# Patient Record
Sex: FEMALE | Race: ASIAN | NOT HISPANIC OR LATINO | Employment: OTHER | URBAN - METROPOLITAN AREA
[De-identification: names, ages, dates, MRNs, and addresses within clinical notes are randomized per-mention and may not be internally consistent; named-entity substitution may affect disease eponyms.]

---

## 2017-05-23 ENCOUNTER — TRANSFERRED RECORDS (OUTPATIENT)
Dept: HEALTH INFORMATION MANAGEMENT | Facility: CLINIC | Age: 67
End: 2017-05-23

## 2017-06-02 ENCOUNTER — TRANSFERRED RECORDS (OUTPATIENT)
Dept: HEALTH INFORMATION MANAGEMENT | Facility: CLINIC | Age: 67
End: 2017-06-02

## 2017-08-14 ENCOUNTER — PRE VISIT (OUTPATIENT)
Dept: OTOLARYNGOLOGY | Facility: CLINIC | Age: 67
End: 2017-08-14

## 2017-08-14 NOTE — TELEPHONE ENCOUNTER
Records received from [facility] & [facility], forwarded to clinic.   Office notes:  DOS 5/23/17, 4/24/17, 4/17/17 with Dr. Nicole Watkins  Radiology reports:  DOS 6/2/17 CT Maxillofacial  (done at [facility]) - (img is in PACS)

## 2017-08-14 NOTE — TELEPHONE ENCOUNTER
1.  Date/reason for appt: 8/28/17 at 11:30 AM - Cyst in Cheek  2.  Referring provider: [facility] Dr. Nicole Watkins  3.  Call to patient (Yes / No - short description): No, pt referred  4.  Previous care at:   [facility]     [facility]

## 2021-05-23 ENCOUNTER — OFFICE VISIT - HEALTHEAST (OUTPATIENT)
Dept: FAMILY MEDICINE | Facility: CLINIC | Age: 71
End: 2021-05-23

## 2021-05-23 DIAGNOSIS — J02.9 PHARYNGITIS, UNSPECIFIED ETIOLOGY: ICD-10-CM

## 2021-05-23 DIAGNOSIS — J02.0 STREPTOCOCCAL PHARYNGITIS: ICD-10-CM

## 2021-05-23 LAB — DEPRECATED S PYO AG THROAT QL EIA: ABNORMAL

## 2021-05-23 RX ORDER — ACETAMINOPHEN 500 MG
1000 TABLET ORAL EVERY 6 HOURS PRN
Qty: 100 TABLET | Refills: 2 | Status: SHIPPED | OUTPATIENT
Start: 2021-05-23 | End: 2023-12-14

## 2021-05-24 LAB
SARS-COV-2 PCR COMMENT: NORMAL
SARS-COV-2 RNA SPEC QL NAA+PROBE: NEGATIVE
SARS-COV-2 VIRUS SPECIMEN SOURCE: NORMAL

## 2021-05-25 ENCOUNTER — COMMUNICATION - HEALTHEAST (OUTPATIENT)
Dept: SCHEDULING | Facility: CLINIC | Age: 71
End: 2021-05-25

## 2021-05-30 ENCOUNTER — RECORDS - HEALTHEAST (OUTPATIENT)
Dept: ADMINISTRATIVE | Facility: CLINIC | Age: 71
End: 2021-05-30

## 2021-05-31 ENCOUNTER — RECORDS - HEALTHEAST (OUTPATIENT)
Dept: ADMINISTRATIVE | Facility: CLINIC | Age: 71
End: 2021-05-31

## 2021-06-16 PROBLEM — J18.9 COMMUNITY ACQUIRED PNEUMONIA OF LEFT LUNG, UNSPECIFIED PART OF LUNG: Status: ACTIVE | Noted: 2018-05-10

## 2021-06-17 NOTE — PROGRESS NOTES
Chief Complaint   Patient presents with     Headache     JUST TODAY-- MILD SORE THROAT      Cough     Blood pressure 149/66, pulse 67, temperature 98.3  F (36.8  C), resp. rate 16, SpO2 98 %.         SUBJECTIVE       Katja is a 70 y.o.  female with a PMH significant for:     Patient Active Problem List   Diagnosis     Benign Essential Hypertension     Esophageal Reflux     Ecchymosis     Community acquired pneumonia of left lung, unspecified part of lung     Abnormal urinalysis     Leukocytosis, unspecified type     Physical deconditioning       Patient complaining of frontal headache, sore throat, cough that started 1 day ago.  Did receive the [employer] vaccine a few months ago.  Sick contacts include  who is here today with her, complaining of similar symptoms.  Patient denies any fevers, chills, chest pain, shortness of breath, constipation, diarrhea.  Denies any known allergies.  Has tried some herbal remedies with minimal improvement in her symptoms.          OBJECTIVE     Vitals:    05/23/21 1725   BP: 149/66   Patient Site: Right Arm   Patient Position: Sitting   Cuff Size: Adult Regular   Pulse: 67   Resp: 16   Temp: 98.3  F (36.8  C)   SpO2: 98%     There is no height or weight on file to calculate BMI.    Constitutional: Awake, alert, cooperative, no acute distress, and appears stated age.  Eyes: sclera clear, conjunctiva normal.  ENT: NC/AT, MMM.  Tonsils nonerythematous and without exudates.  Neck: Supple, symmetrical, trachea midline.  No submandibular lymphadenopathy appreciated.  Back: Symmetric, no curvature  Lungs: No increased WOB, CTABL, no crackles or wheezing appreciated.  Cardiovascular: Appears well perfused, RRR, normal S1 and S2, no S3 or S4, and no murmur appreciated.  Neurologic: Cranial nerves II-XII are grossly intact.   Neuropsychiatric: Normal affect, mood, orientation, memory and insight.    Pertinent Labs  Recent Results (from the past 24 hour(s))   Rapid Strep A  Screen-Throat swab    Specimen: Throat   Result Value Ref Range    Rapid Strep A Antigen Group A Strep detected (!) No Group A Strep detected, presumptive negative     Lab Results: personally reviewed.       ASSESSMENT AND PLAN     Katja was seen today for headache and cough.    Diagnoses and all orders for this visit:    Streptococcal pharyngitis  Recommend symptomatic management with Tylenol, rest, hydration, in addition to amoxicillin as below.  If symptoms worsen or continue to persist, recommend following up with PCP or come back to urgent care.  -     acetaminophen (TYLENOL EXTRA STRENGTH) 500 MG tablet; Take 2 tablets (1,000 mg total) by mouth every 6 (six) hours as needed for pain.  -     amoxicillin (AMOXIL) 500 MG capsule; Take 1 capsule (500 mg total) by mouth 2 (two) times a day for 10 days.    Pharyngitis, unspecified etiology  Covid pending, advised to remain quarantine until results come back, please call patient once resulted.  -     Rapid Strep A Screen-Throat swab  -     Symptomatic COVID-19 Virus (CORONAVIRUS) PCR      Austin Ariane  5/23/2021

## 2021-06-18 NOTE — PATIENT INSTRUCTIONS - HE
Patient Instructions by Austin Thakkar MD at 5/23/2021  5:00 PM     Author: Austin Thakkar MD Service: -- Author Type: Physician    Filed: 5/23/2021  6:38 PM Encounter Date: 5/23/2021 Status: Signed    : Austin Thakkar MD (Physician)         Patient Education     Pharyngitis: Strep (Confirmed)    You have had a positive test for strep throat. Strep throat is a contagious illness. It is spread by coughing, kissing or by touching others after touching your mouth or nose. Symptoms include throat pain that is worse with swallowing, aching all over, headache, and fever. It is treated with antibiotic medicine. This should help you start to feel better in 1 to 2 days.  Home care    Rest at home. Drink plenty of fluids to you won't get dehydrated.    No work or school for the first 2 days of taking the antibiotics. After this time, you will not be contagious. You can then return to school or work if you are feeling better.     Take antibiotic medicine for the full 10 days, even if you feel better. This is very important to ensure the infection is treated. It is also important to prevent medicine-resistant germs from developing. If you were given an antibiotic shot, you don't need any more antibiotics.    You may use acetaminophen or ibuprofen to control pain or fever, unless another medicine was prescribed for this. Talk with your healthcare provider before taking these medicines if you have chronic liver or kidney disease. Also talk with your healthcare provider if you have had a stomach ulcer or GI bleeding.    Throat lozenges or sprays help reduce pain. Gargling with warm saltwater will also reduce throat pain. Dissolve 1/2 teaspoon of salt in 1 glass of warm water. This may be useful just before meals.     Soft foods are OK. Don't eat salty or spicy foods.  Follow-up care  Follow up with your healthcare provider or our staff if you don't get better over the next week.  When to seek medical advice  Call your healthcare  provider right away if any of these occur:    Fever of 100.4 F (38 C) or higher, or as directed by your healthcare provider    New or worsening ear pain, sinus pain, or headache    Painful lumps in the back of neck    Stiff neck    Lymph nodes getting larger or becoming soft in the middle    You can't swallow liquids or you can't open your mouth wide because of throat pain    Signs of dehydration. These include very dark urine or no urine, sunken eyes, and dizziness.    Trouble breathing or noisy breathing    Muffled voice    Rash  Prevention  Here are steps you can take to help prevent an infection:    Keep good hand washing habits.    Dont have close contact with people who have sore throats, colds, or other upper respiratory infections.    Dont smoke, and stay away from secondhand smoke.  Date Last Reviewed: 11/1/2017 2000-2017 [employer] [address] [place] [address], [place] [address]. All rights reserved. This information is not intended as a substitute for professional medical care. Always follow your healthcare professional's instructions.

## 2021-07-06 VITALS
RESPIRATION RATE: 16 BRPM | DIASTOLIC BLOOD PRESSURE: 66 MMHG | TEMPERATURE: 98.3 F | SYSTOLIC BLOOD PRESSURE: 149 MMHG | OXYGEN SATURATION: 98 % | HEART RATE: 67 BPM

## 2022-06-11 ENCOUNTER — APPOINTMENT (OUTPATIENT)
Dept: CT IMAGING | Facility: HOSPITAL | Age: 72
End: 2022-06-11
Attending: EMERGENCY MEDICINE
Payer: COMMERCIAL

## 2022-06-11 ENCOUNTER — APPOINTMENT (OUTPATIENT)
Dept: RADIOLOGY | Facility: HOSPITAL | Age: 72
End: 2022-06-11
Attending: EMERGENCY MEDICINE
Payer: COMMERCIAL

## 2022-06-11 ENCOUNTER — HOSPITAL ENCOUNTER (EMERGENCY)
Facility: HOSPITAL | Age: 72
Discharge: HOME | End: 2022-06-11
Attending: EMERGENCY MEDICINE | Admitting: EMERGENCY MEDICINE
Payer: COMMERCIAL

## 2022-06-11 VITALS
HEART RATE: 69 BPM | WEIGHT: 99 LBS | BODY MASS INDEX: 19.33 KG/M2 | SYSTOLIC BLOOD PRESSURE: 180 MMHG | DIASTOLIC BLOOD PRESSURE: 81 MMHG | TEMPERATURE: 98.2 F | OXYGEN SATURATION: 97 % | RESPIRATION RATE: 20 BRPM

## 2022-06-11 DIAGNOSIS — U07.1 INFECTION DUE TO 2019 NOVEL CORONAVIRUS: ICD-10-CM

## 2022-06-11 DIAGNOSIS — R51.9 NONINTRACTABLE HEADACHE, UNSPECIFIED CHRONICITY PATTERN, UNSPECIFIED HEADACHE TYPE: ICD-10-CM

## 2022-06-11 LAB
ANION GAP SERPL CALCULATED.3IONS-SCNC: 12 MMOL/L (ref 5–18)
BASOPHILS # BLD AUTO: 0 10E3/UL (ref 0–0.2)
BASOPHILS NFR BLD AUTO: 0 %
BUN SERPL-MCNC: 15 MG/DL (ref 8–22)
CALCIUM SERPL-MCNC: 10.1 MG/DL (ref 8.5–10.5)
CHLORIDE BLD-SCNC: 87 MMOL/L (ref 98–107)
CO2 SERPL-SCNC: 29 MMOL/L (ref 22–31)
CREAT SERPL-MCNC: 0.8 MG/DL (ref 0.6–1.1)
EOSINOPHIL # BLD AUTO: 0 10E3/UL (ref 0–0.7)
EOSINOPHIL NFR BLD AUTO: 0 %
ERYTHROCYTE [DISTWIDTH] IN BLOOD BY AUTOMATED COUNT: 12 % (ref 10–15)
GFR SERPL CREATININE-BSD FRML MDRD: 79 ML/MIN/1.73M2
GLUCOSE BLD-MCNC: 112 MG/DL (ref 70–125)
HCT VFR BLD AUTO: 42.9 % (ref 35–47)
HGB BLD-MCNC: 14.5 G/DL (ref 11.7–15.7)
HOLD SPECIMEN: NORMAL
IMM GRANULOCYTES # BLD: 0 10E3/UL
IMM GRANULOCYTES NFR BLD: 0 %
LYMPHOCYTES # BLD AUTO: 1.3 10E3/UL (ref 0.8–5.3)
LYMPHOCYTES NFR BLD AUTO: 15 %
MAGNESIUM SERPL-MCNC: 1.9 MG/DL (ref 1.8–2.6)
MCH RBC QN AUTO: 27.5 PG (ref 26.5–33)
MCHC RBC AUTO-ENTMCNC: 33.8 G/DL (ref 31.5–36.5)
MCV RBC AUTO: 81 FL (ref 78–100)
MONOCYTES # BLD AUTO: 0.7 10E3/UL (ref 0–1.3)
MONOCYTES NFR BLD AUTO: 8 %
NEUTROPHILS # BLD AUTO: 6.7 10E3/UL (ref 1.6–8.3)
NEUTROPHILS NFR BLD AUTO: 77 %
NRBC # BLD AUTO: 0 10E3/UL
NRBC BLD AUTO-RTO: 0 /100
PLATELET # BLD AUTO: 219 10E3/UL (ref 150–450)
POTASSIUM BLD-SCNC: 3.5 MMOL/L (ref 3.5–5)
RBC # BLD AUTO: 5.27 10E6/UL (ref 3.8–5.2)
SODIUM SERPL-SCNC: 128 MMOL/L (ref 136–145)
TROPONIN I SERPL-MCNC: 0.02 NG/ML (ref 0–0.29)
WBC # BLD AUTO: 8.7 10E3/UL (ref 4–11)

## 2022-06-11 PROCEDURE — 80048 BASIC METABOLIC PNL TOTAL CA: CPT | Performed by: EMERGENCY MEDICINE

## 2022-06-11 PROCEDURE — 85025 COMPLETE CBC W/AUTO DIFF WBC: CPT | Performed by: EMERGENCY MEDICINE

## 2022-06-11 PROCEDURE — 96375 TX/PRO/DX INJ NEW DRUG ADDON: CPT

## 2022-06-11 PROCEDURE — 250N000011 HC RX IP 250 OP 636: Performed by: EMERGENCY MEDICINE

## 2022-06-11 PROCEDURE — 71046 X-RAY EXAM CHEST 2 VIEWS: CPT

## 2022-06-11 PROCEDURE — 36415 COLL VENOUS BLD VENIPUNCTURE: CPT | Performed by: EMERGENCY MEDICINE

## 2022-06-11 PROCEDURE — 258N000003 HC RX IP 258 OP 636: Performed by: EMERGENCY MEDICINE

## 2022-06-11 PROCEDURE — 84484 ASSAY OF TROPONIN QUANT: CPT | Performed by: EMERGENCY MEDICINE

## 2022-06-11 PROCEDURE — 70450 CT HEAD/BRAIN W/O DYE: CPT

## 2022-06-11 PROCEDURE — 96374 THER/PROPH/DIAG INJ IV PUSH: CPT

## 2022-06-11 PROCEDURE — 99285 EMERGENCY DEPT VISIT HI MDM: CPT | Mod: 25

## 2022-06-11 PROCEDURE — 83735 ASSAY OF MAGNESIUM: CPT | Performed by: EMERGENCY MEDICINE

## 2022-06-11 PROCEDURE — 96361 HYDRATE IV INFUSION ADD-ON: CPT

## 2022-06-11 PROCEDURE — 93005 ELECTROCARDIOGRAM TRACING: CPT | Performed by: EMERGENCY MEDICINE

## 2022-06-11 RX ORDER — METOCLOPRAMIDE HYDROCHLORIDE 5 MG/ML
10 INJECTION INTRAMUSCULAR; INTRAVENOUS ONCE
Status: COMPLETED | OUTPATIENT
Start: 2022-06-11 | End: 2022-06-11

## 2022-06-11 RX ORDER — DIPHENHYDRAMINE HYDROCHLORIDE 50 MG/ML
25 INJECTION INTRAMUSCULAR; INTRAVENOUS ONCE
Status: COMPLETED | OUTPATIENT
Start: 2022-06-11 | End: 2022-06-11

## 2022-06-11 RX ORDER — KETOROLAC TROMETHAMINE 30 MG/ML
15 INJECTION, SOLUTION INTRAMUSCULAR; INTRAVENOUS ONCE
Status: COMPLETED | OUTPATIENT
Start: 2022-06-11 | End: 2022-06-11

## 2022-06-11 RX ADMIN — KETOROLAC TROMETHAMINE 15 MG: 30 INJECTION, SOLUTION INTRAMUSCULAR at 14:17

## 2022-06-11 RX ADMIN — METOCLOPRAMIDE HYDROCHLORIDE 10 MG: 5 INJECTION INTRAMUSCULAR; INTRAVENOUS at 14:12

## 2022-06-11 RX ADMIN — DIPHENHYDRAMINE HYDROCHLORIDE 25 MG: 50 INJECTION, SOLUTION INTRAMUSCULAR; INTRAVENOUS at 14:14

## 2022-06-11 RX ADMIN — SODIUM CHLORIDE 1000 ML: 9 INJECTION, SOLUTION INTRAVENOUS at 14:38

## 2022-06-11 NOTE — ED PROVIDER NOTES
[facility] EMERGENCY DEPARTMENT  PHYSICIAN SIGNOUT NOTE    MRN: [id number]    FINAL IMPRESSION     1. Infection due to 2019 novel coronavirus    2. Nonintractable headache, unspecified chronicity pattern, unspecified headache type          ED COURSE & MDM     Please refer to the previous provider's note for full HPI, ROS, exam, and MDM.      3:47 PM Signout received from Leeanne Fortune MD, now primarily managing the patient.    4:21 PM Patient reevaluated prior to discharge.    Patient presented for cough. Initial vital signs reassuring.  Her symptoms are consistent with her known COVID infection.  Labs are reassuring, no signs of concerning complications. Patient discharged in stable condition. Return precautions provided. All questions answered.    =================================================================    SUMMARY     Brief HPI: 71 year old female presenting with cough. Patient reports onset of cough, headache, and increasing weakness ever since testing positive for covid 19 a weeks ago. She has a daughter that endorses poor appetite and reduce oral fluid intake as well. Patient is vaccinated for covid.  Patient reports some nausea at times but is not currently nauseated.  She denies any shortness of breath or chest pain.  She denies abdominal pain.  She previously had fevers and chills but had has resolved.  She denies any associated urinary symptoms.  Patient reports that her headache has been getting worse for the last week but has been present the whole time.    Notable exam findings: None    Pertinent labwork & imaging: Patient has hazy groundglass opacities in her bilateral lower lungs which is likely consistent with COVID-pneumonia.    Pending: labs    Plan: discharge if labs are unremarkable      TESTING   All testing reviewed and interpreted.    EKG  See Dr. Michele's note.    LABS  Labs Ordered and Resulted from Time of ED Arrival to Time of ED Departure   BASIC  METABOLIC PANEL - Abnormal       Result Value    Sodium 128 (*)     Potassium 3.5      Chloride 87 (*)     Carbon Dioxide (CO2) 29      Anion Gap 12      Urea Nitrogen 15      Creatinine 0.80      Calcium 10.1      Glucose 112      GFR Estimate 79     CBC WITH PLATELETS AND DIFFERENTIAL - Abnormal    WBC Count 8.7      RBC Count 5.27 (*)     Hemoglobin 14.5      Hematocrit 42.9      MCV 81      MCH 27.5      MCHC 33.8      RDW 12.0      Platelet Count 219      % Neutrophils 77      % Lymphocytes 15      % Monocytes 8      % Eosinophils 0      % Basophils 0      % Immature Granulocytes 0      NRBCs per 100 WBC 0      Absolute Neutrophils 6.7      Absolute Lymphocytes 1.3      Absolute Monocytes 0.7      Absolute Eosinophils 0.0      Absolute Basophils 0.0      Absolute Immature Granulocytes 0.0      Absolute NRBCs 0.0     MAGNESIUM - Normal    Magnesium 1.9     TROPONIN I - Normal    Troponin I 0.02         IMAGING  Head CT w/o contrast   Final Result   IMPRESSION:   1.  No acute intracranial hemorrhage.   2.  Chronic lacunar infarcts involving the left caudate nucleus.      XR Chest 2 Views   Final Result   IMPRESSION: Hazy groundglass opacities at the lung bases. Small bilateral pleural effusions. No pneumothorax.               I attest that Yaritza Jean-Baptiste is acting in a scribe capacity, has observed my performance of services, and has documented them in accordance with my direction.       Shane Walters MD  06/11/22 8699

## 2022-06-11 NOTE — DISCHARGE INSTRUCTIONS
You were seen in the Emergency Department today for evaluation of cough and headache and generally just not feeling well.   Your imaging studies showed COVID-pneumonia.  Your oxygen level looks good.  Follow up with your primary care physician to ensure resolution of symptoms. Return if you have new or worsening symptoms.     You may be a candidate for Monoclonal antibodies: ~90% effective at hospitalization reduction (infusion)        Requirements:            -Within 10 days of symptom onset           -Administered through MNRAP ([email or web address] providers, patients or someone helping may complete this screening tool. [facility] offers a lottery process to refer patients for COVID-19 monoclonal antibody (mAb) treatment. Patients may call [phone] with questions about monoclonal antibodies.)

## 2022-06-11 NOTE — ED PROVIDER NOTES
EMERGENCY DEPARTMENT ENCOUNTER      NAME: Katja Long  AGE: 71 year old female  YOB: 1950  MRN: [id number]  EVALUATION DATE & TIME: No admission date for patient encounter.    PCP: Junito Saenz    ED PROVIDER: Marlena Michele M.D.      Chief Complaint   Patient presents with     Cough     FINAL IMPRESSION:  1. Infection due to 2019 novel coronavirus    2. Nonintractable headache, unspecified chronicity pattern, unspecified headache type      ED COURSE & MEDICAL DECISION MAKING:    Pertinent Labs & Imaging studies reviewed. (See chart for details)  ED Course as of 06/11/22 1506   Sat Jun 11, 2022   1218 Given the patient's presentation today we will plan to get CT imaging of the head to make sure there is not another cause of the headache.  I certainly think it could just be related to recent COVID.  We will check some blood work to make sure she is not dehydrated or anemic or have significant electrolyte abnormalities.  We will get a chest x-ray to look for any signs of pneumonia although her lung sounds are clear bilaterally.  We will treat her with a migraine cocktail to see if that helps with her headache and will give her some IV fluids.  I think if all of her labs and imaging come back unremarkable and we can get her feeling better this is likely just sequelae related to recent COVID.  Her and her family are in agreement with the plan.   1352 Patient has hazy groundglass opacities in her bilateral lower lungs which is likely consistent with COVID-pneumonia.  We will see with the rest of her labs and imaging look like.   1500 Patient will be signed out at change of shift pending lab work, reassessment and disposition.       12:00 PM I met with the patient and performed my initial examination before discussing plan of care going forward    MEDICATIONS GIVEN IN THE EMERGENCY:  Medications   ketorolac (TORADOL) injection 15 mg (15 mg Intravenous Given 6/11/22 1417)   metoclopramide (REGLAN) injection  10 mg (10 mg Intravenous Given 6/11/22 1412)   diphenhydrAMINE (BENADRYL) injection 25 mg (25 mg Intravenous Given 6/11/22 1414)   0.9% sodium chloride BOLUS (1,000 mLs Intravenous New Bag 6/11/22 5085)       NEW PRESCRIPTIONS STARTED AT TODAY'S ER VISIT  New Prescriptions    No medications on file          =================================================================    HPI    Triage Note: Patient presents here for evaluation and treatment of symptoms related to Covid 19. She tested positive one week ago. She is noting a headache and increasing weakness. Her daughter reports poor appetite and reduced oral fluid intake.     Patient information was obtained from: Patient     Use of : YES(PHONE) language: RUFINO Long is a 71 year old female with a history of community acquired pneumonia and hypertension who presents via private car for evaluation of a cough.     Patient reports onset of cough, headache, and increasing weakness ever since testing positive for covid 19 a weeks ago. She has a daughter that endorses poor appetite and reduce oral fluid intake as well. Patient is vaccinated for covid.  Patient reports some nausea at times but is not currently nauseated.  She denies any shortness of breath or chest pain.  She denies abdominal pain.  She previously had fevers and chills but had has resolved.  She denies any associated urinary symptoms.  Patient reports that her headache has been getting worse for the last week but has been present the whole time.  No other complaints at this time.     REVIEW OF SYSTEMS   Except as stated in the HPI all other systems reviewed and are negative.    PAST MEDICAL HISTORY:  Past Medical History:   Diagnosis Date     Hypertension      Kidney stone        PAST SURGICAL HISTORY:  Past Surgical History:   Procedure Laterality Date     EYE SURGERY       HC REMOVAL GALLBLADDER      Description: Cholecystectomy;  Recorded: 03/14/2014;       CURRENT MEDICATIONS:     No current facility-administered medications for this encounter.    Current Outpatient Medications:      acetaminophen (TYLENOL EXTRA STRENGTH) 500 MG tablet, [ACETAMINOPHEN (TYLENOL EXTRA STRENGTH) 500 MG TABLET] Take 2 tablets (1,000 mg total) by mouth every 6 (six) hours as needed for pain., Disp: 100 tablet, Rfl: 2     guaiFENesin ER (MUCINEX) 600 mg 12 hr tablet, [GUAIFENESIN ER (MUCINEX) 600 MG 12 HR TABLET] Take 1 tablet (600 mg total) by mouth 2 (two) times a day. For 5 days, Disp: , Rfl: 0     ketotifen (ZADITOR/ZYRTEC ITCHY EYES) 0.025 % (0.035 %) ophthalmic solution, [KETOTIFEN (ZADITOR/ZYRTEC ITCHY EYES) 0.025 % (0.035 %) OPHTHALMIC SOLUTION] Administer 1 drop to both eyes 2 (two) times a day. , Disp: , Rfl: 1     lisinopril-hydrochlorothiazide (PRINZIDE,ZESTORETIC) 10-12.5 mg per tablet, [LISINOPRIL-HYDROCHLOROTHIAZIDE (PRINZIDE,ZESTORETIC) 10-12.5 MG PER TABLET] Take 1 tablet by mouth daily., Disp: , Rfl: 4     omeprazole (PRILOSEC) 40 MG capsule, [OMEPRAZOLE (PRILOSEC) 40 MG CAPSULE] Take 40 mg by mouth daily., Disp: , Rfl: 3     ROBAFEN DM  mg/5 mL liquid, [ROBAFEN DM  MG/5 ML LIQUID] Take 5 mL by mouth every 4 (four) hours as needed. , Disp: , Rfl: 0     VENTOLIN HFA 90 mcg/actuation inhaler, [VENTOLIN HFA 90 MCG/ACTUATION INHALER] Inhale 1-2 puffs every 4 (four) hours as needed., Disp: , Rfl: 1    ALLERGIES:  No Known Allergies    FAMILY HISTORY:  No family history on file.    SOCIAL HISTORY:   Social History     Socioeconomic History     Marital status:    Tobacco Use     Smoking status: Never Smoker     Smokeless tobacco: Never Used   Substance and Sexual Activity     Alcohol use: No     Drug use: No       PHYSICAL EXAM    VITAL SIGNS: /62   Pulse 66   Temp 98.2  F (36.8  C) (Oral)   Resp 20   Wt 44.9 kg (99 lb)   SpO2 93%   BMI 19.33 kg/m     GENERAL: Awake, Alert, answering questions, No acute distress, Well nourished   HEENT: Normal cephalic, Atraumatic,  bilateral external ears normal, No scleral icterus, mask in place  NECK: No obvious swelling or abnormality, No stridor  PULMONARY:Normal and symmetric breath sounds, No respiratory distress, Lungs clear to auscultation bilaterally. No wheezing  CARDIOVASCULAR: Regular rate and rhythm, Distal pulses present and normal.  ABDOMINAL: Soft, Nondistended, Nontender, No flank tenderness, No palpable masses  BACK: No bruising or tenderness.  EXTREMITIES: Moves all extremities spontaneously, warm, no edema, No major deformities  NEURO: No facial droop, normal motor function, Normal speech   PSYCH: Normal mood and affect  SKIN: No rashes on visualized skin, dry, warm     LAB:  All pertinent labs reviewed and interpreted.  Results for orders placed or performed during the hospital encounter of 06/11/22   XR Chest 2 Views    Impression    IMPRESSION: Hazy groundglass opacities at the lung bases. Small bilateral pleural effusions. No pneumothorax.     Head CT w/o contrast    Impression    IMPRESSION:  1.  No acute intracranial hemorrhage.  2.  Chronic lacunar infarcts involving the left caudate nucleus.       RADIOLOGY:  Head CT w/o contrast   Final Result   IMPRESSION:   1.  No acute intracranial hemorrhage.   2.  Chronic lacunar infarcts involving the left caudate nucleus.      XR Chest 2 Views   Final Result   IMPRESSION: Hazy groundglass opacities at the lung bases. Small bilateral pleural effusions. No pneumothorax.             EKG:    Date and time: June 11, 2022 at 1233  Rate: 65 bpm  Rhythm: Normal sinus rhythm  VA interval: 158 ms  QRS interval: 102 ms  QT/QTc: 448/465 ms  ST changes or T wave changes: Nonspecific ST abnormality and no T wave abnormalities  Change from prior ECG: No significant change from prior  I have independently reviewed and interpreted this EKG.     I, Lee Moran, am serving as a scribe to document services personally performed by Dr. Michele based on my observation and the provider's  statements to me. I, Marlena Michele MD attest that Lee Moran is acting in a scribe capacity, has observed my performance of the services and has documented them in accordance with my direction.    Marlena Michele M.D.  Emergency Medicine  [facility] EMERGENCY DEPARTMENT  [address] [place] [id number]  [phone]  Dept: [phone]       Marlena Michele MD  06/11/22 0387

## 2022-06-11 NOTE — ED TRIAGE NOTES
Patient presents here for evaluation and treatment of symptoms related to Covid 19. She tested positive one week ago. She is noting a headache and increasing weakness. Her daughter reports poor appetite and reduced oral fluid intake.

## 2022-06-12 LAB
ATRIAL RATE - MUSE: 65 BPM
DIASTOLIC BLOOD PRESSURE - MUSE: NORMAL MMHG
INTERPRETATION ECG - MUSE: NORMAL
P AXIS - MUSE: 51 DEGREES
PR INTERVAL - MUSE: 158 MS
QRS DURATION - MUSE: 102 MS
QT - MUSE: 448 MS
QTC - MUSE: 465 MS
R AXIS - MUSE: -38 DEGREES
SYSTOLIC BLOOD PRESSURE - MUSE: NORMAL MMHG
T AXIS - MUSE: 41 DEGREES
VENTRICULAR RATE- MUSE: 65 BPM

## 2023-12-14 ENCOUNTER — APPOINTMENT (OUTPATIENT)
Dept: RADIOLOGY | Facility: CLINIC | Age: 73
End: 2023-12-14
Payer: COMMERCIAL

## 2023-12-14 ENCOUNTER — HOSPITAL ENCOUNTER (OUTPATIENT)
Facility: CLINIC | Age: 73
Setting detail: OBSERVATION
Discharge: LEFT BEFORE DISCHARGE | End: 2023-12-15
Attending: EMERGENCY MEDICINE | Admitting: EMERGENCY MEDICINE
Payer: COMMERCIAL

## 2023-12-14 ENCOUNTER — APPOINTMENT (OUTPATIENT)
Dept: CT IMAGING | Facility: CLINIC | Age: 73
End: 2023-12-14
Payer: COMMERCIAL

## 2023-12-14 DIAGNOSIS — N39.0 ACUTE UTI: ICD-10-CM

## 2023-12-14 DIAGNOSIS — M54.16 LUMBAR RADICULOPATHY: Primary | ICD-10-CM

## 2023-12-14 DIAGNOSIS — T18.4XXA FOREIGN BODY IN COLON, INITIAL ENCOUNTER: ICD-10-CM

## 2023-12-14 LAB
ALBUMIN SERPL BCG-MCNC: 3.7 G/DL (ref 3.5–5.2)
ALBUMIN UR-MCNC: NEGATIVE MG/DL
ALP SERPL-CCNC: 88 U/L (ref 40–150)
ALT SERPL W P-5'-P-CCNC: 11 U/L (ref 0–50)
ANION GAP SERPL CALCULATED.3IONS-SCNC: 9 MMOL/L (ref 7–15)
APPEARANCE UR: CLEAR
AST SERPL W P-5'-P-CCNC: 12 U/L (ref 0–45)
BASOPHILS # BLD AUTO: 0.1 10E3/UL (ref 0–0.2)
BASOPHILS NFR BLD AUTO: 0 %
BILIRUB DIRECT SERPL-MCNC: <0.2 MG/DL (ref 0–0.3)
BILIRUB SERPL-MCNC: 0.3 MG/DL
BILIRUB UR QL STRIP: NEGATIVE
BUN SERPL-MCNC: 20.5 MG/DL (ref 8–23)
CALCIUM SERPL-MCNC: 9.9 MG/DL (ref 8.8–10.2)
CHLORIDE SERPL-SCNC: 100 MMOL/L (ref 98–107)
COLOR UR AUTO: COLORLESS
CREAT SERPL-MCNC: 0.91 MG/DL (ref 0.51–0.95)
DEPRECATED HCO3 PLAS-SCNC: 27 MMOL/L (ref 22–29)
EGFRCR SERPLBLD CKD-EPI 2021: 67 ML/MIN/1.73M2
EOSINOPHIL # BLD AUTO: 0.3 10E3/UL (ref 0–0.7)
EOSINOPHIL NFR BLD AUTO: 2 %
ERYTHROCYTE [DISTWIDTH] IN BLOOD BY AUTOMATED COUNT: 12.7 % (ref 10–15)
GLUCOSE SERPL-MCNC: 108 MG/DL (ref 70–99)
GLUCOSE UR STRIP-MCNC: NEGATIVE MG/DL
HCT VFR BLD AUTO: 35.4 % (ref 35–47)
HGB BLD-MCNC: 11.7 G/DL (ref 11.7–15.7)
HGB UR QL STRIP: NEGATIVE
IMM GRANULOCYTES # BLD: 0.1 10E3/UL
IMM GRANULOCYTES NFR BLD: 1 %
KETONES UR STRIP-MCNC: NEGATIVE MG/DL
LEUKOCYTE ESTERASE UR QL STRIP: ABNORMAL
LIPASE SERPL-CCNC: 17 U/L (ref 13–60)
LYMPHOCYTES # BLD AUTO: 2.8 10E3/UL (ref 0.8–5.3)
LYMPHOCYTES NFR BLD AUTO: 20 %
MCH RBC QN AUTO: 27.6 PG (ref 26.5–33)
MCHC RBC AUTO-ENTMCNC: 33.1 G/DL (ref 31.5–36.5)
MCV RBC AUTO: 84 FL (ref 78–100)
MONOCYTES # BLD AUTO: 0.8 10E3/UL (ref 0–1.3)
MONOCYTES NFR BLD AUTO: 6 %
NEUTROPHILS # BLD AUTO: 9.8 10E3/UL (ref 1.6–8.3)
NEUTROPHILS NFR BLD AUTO: 71 %
NITRATE UR QL: NEGATIVE
NRBC # BLD AUTO: 0 10E3/UL
NRBC BLD AUTO-RTO: 0 /100
PH UR STRIP: 6 [PH] (ref 5–7)
PLATELET # BLD AUTO: 336 10E3/UL (ref 150–450)
POTASSIUM SERPL-SCNC: 3.8 MMOL/L (ref 3.4–5.3)
PROT SERPL-MCNC: 6.5 G/DL (ref 6.4–8.3)
RBC # BLD AUTO: 4.24 10E6/UL (ref 3.8–5.2)
RBC URINE: <1 /HPF
SARS-COV-2 RNA RESP QL NAA+PROBE: NEGATIVE
SODIUM SERPL-SCNC: 136 MMOL/L (ref 135–145)
SP GR UR STRIP: 1.03 (ref 1–1.03)
SQUAMOUS EPITHELIAL: <1 /HPF
UROBILINOGEN UR STRIP-MCNC: <2 MG/DL
WBC # BLD AUTO: 13.9 10E3/UL (ref 4–11)
WBC URINE: 12 /HPF

## 2023-12-14 PROCEDURE — 99285 EMERGENCY DEPT VISIT HI MDM: CPT | Mod: 25

## 2023-12-14 PROCEDURE — 73502 X-RAY EXAM HIP UNI 2-3 VIEWS: CPT

## 2023-12-14 PROCEDURE — 250N000011 HC RX IP 250 OP 636: Mod: JZ | Performed by: STUDENT IN AN ORGANIZED HEALTH CARE EDUCATION/TRAINING PROGRAM

## 2023-12-14 PROCEDURE — 99253 IP/OBS CNSLTJ NEW/EST LOW 45: CPT | Performed by: SURGERY

## 2023-12-14 PROCEDURE — 81001 URINALYSIS AUTO W/SCOPE: CPT

## 2023-12-14 PROCEDURE — 36415 COLL VENOUS BLD VENIPUNCTURE: CPT

## 2023-12-14 PROCEDURE — 82248 BILIRUBIN DIRECT: CPT

## 2023-12-14 PROCEDURE — 250N000013 HC RX MED GY IP 250 OP 250 PS 637

## 2023-12-14 PROCEDURE — 99222 1ST HOSP IP/OBS MODERATE 55: CPT | Performed by: HOSPITALIST

## 2023-12-14 PROCEDURE — 82374 ASSAY BLOOD CARBON DIOXIDE: CPT

## 2023-12-14 PROCEDURE — 85004 AUTOMATED DIFF WBC COUNT: CPT

## 2023-12-14 PROCEDURE — 83690 ASSAY OF LIPASE: CPT

## 2023-12-14 PROCEDURE — 87086 URINE CULTURE/COLONY COUNT: CPT

## 2023-12-14 PROCEDURE — 74177 CT ABD & PELVIS W/CONTRAST: CPT

## 2023-12-14 PROCEDURE — 999N000104 CT LUMBAR SPINE RECONSTRUCTED

## 2023-12-14 PROCEDURE — 87635 SARS-COV-2 COVID-19 AMP PRB: CPT | Performed by: HOSPITALIST

## 2023-12-14 RX ORDER — PIPERACILLIN SODIUM, TAZOBACTAM SODIUM 3; .375 G/15ML; G/15ML
3.38 INJECTION, POWDER, LYOPHILIZED, FOR SOLUTION INTRAVENOUS ONCE
Status: DISCONTINUED | OUTPATIENT
Start: 2023-12-14 | End: 2023-12-14

## 2023-12-14 RX ORDER — GABAPENTIN 300 MG/1
300 CAPSULE ORAL DAILY PRN
COMMUNITY

## 2023-12-14 RX ORDER — FAMOTIDINE 40 MG/1
40 TABLET, FILM COATED ORAL
COMMUNITY

## 2023-12-14 RX ORDER — IOPAMIDOL 755 MG/ML
90 INJECTION, SOLUTION INTRAVASCULAR ONCE
Status: COMPLETED | OUTPATIENT
Start: 2023-12-14 | End: 2023-12-14

## 2023-12-14 RX ORDER — ACETAMINOPHEN 325 MG/1
650 TABLET ORAL ONCE
Status: COMPLETED | OUTPATIENT
Start: 2023-12-14 | End: 2023-12-14

## 2023-12-14 RX ORDER — ACETAMINOPHEN 650 MG/1
650 TABLET, FILM COATED, EXTENDED RELEASE ORAL EVERY 8 HOURS PRN
COMMUNITY
Start: 2023-11-10

## 2023-12-14 RX ORDER — TIZANIDINE 2 MG/1
2 TABLET ORAL 3 TIMES DAILY PRN
COMMUNITY
End: 2023-12-14

## 2023-12-14 RX ADMIN — ACETAMINOPHEN 650 MG: 325 TABLET ORAL at 20:50

## 2023-12-14 RX ADMIN — IOPAMIDOL 90 ML: 755 INJECTION, SOLUTION INTRAVENOUS at 19:13

## 2023-12-14 ASSESSMENT — ENCOUNTER SYMPTOMS
BLOOD IN STOOL: 0
COUGH: 0
LIGHT-HEADEDNESS: 0
NAUSEA: 0
CHILLS: 0
DIZZINESS: 0
FEVER: 0
DIARRHEA: 0
ABDOMINAL PAIN: 1

## 2023-12-14 ASSESSMENT — ACTIVITIES OF DAILY LIVING (ADL)
ADLS_ACUITY_SCORE: 36

## 2023-12-14 NOTE — ED PROVIDER NOTES
Emergency Department Midlevel Supervisory Note     I personally saw the patient and performed a substantive portion of the visit including all aspects of the medical decision making.    ED Course:  5:30 PM Maryjo Washburn PA-C staffed patient with me. I agree with their assessment and plan of management, and I will see the patient.      Brief HPI:     Katja Long is a 73 year old female who presents for evaluation of abdominal pain and hip pain.  LLQ pain and right hip pain for the past week.  Also notes some intermittent back pain has been ongoing for about a month.  Has some radiculopathy down the right leg but no bowel or bladder incontinence.  No fevers.  The left lower quadrant pain but somewhat persistent and constant.  Had some nonbloody nonbilious emesis early in the week but no vomiting since.  No ongoing nausea.  No fevers.  Denies chest pain or shortness of breath.  Denies prior abdominal surgeries.    I, Antonia Aly, am serving as a scribe to document services personally performed by Lamont Daniels MD, based on my observations and the provider's statements to me.   I, Lamont Daniels MD attest that Antonia Aly was acting in a scribe capacity, has observed my performance of the services and has documented them in accordance with my direction.    Brief Physical Exam: BP (!) 154/67   Pulse 71   Temp 97.8  F (36.6  C) (Temporal)   Resp 18   Ht 1.524 m (5')   Wt 44.5 kg (98 lb)   SpO2 96%   BMI 19.14 kg/m    Constitutional:  Alert, in no acute distress  EYES: Conjunctivae clear  HENT:  Atraumatic, normocephalic  Respiratory:  Respirations even, unlabored, in no acute respiratory distress  Cardiovascular:  Regular rate and rhythm, good peripheral perfusion  GI: Soft, nondistended, nontender, no palpable masses, no rebound, no guarding   Musculoskeletal:  No edema. No cyanosis. Range of motion major extremities intact.    Integument: Warm, Dry, No erythema, No rash.   Neurologic:  Alert & oriented, no  focal deficits noted  Psych: Normal mood and affect     MDM:  Patient 73-year-old female presented to the emergency department for evaluation of lower abdominal pain as well as back pain.   no red flag signs of back pain no need for MRI imaging at this point in time to evaluate for cauda equina or cord compression.  However given location persistence of pain will obtain a CT of the abdomen pelvis as well as a lumbar CT to evaluate for any acute traumatic process could be causing her back pain.    Labs reviewed and interpreted myself.  CBC shows a leukocytosis of 13.9 with a normal hemoglobin.  Hepatic function panel unremarkable.  BMP is also reassuring.  UA shows 12 white cells and and leukocyte esterase.    CT lumbar spine does show some foraminal stenosis which may be causing the patient's sciatic type pain.  CT of the abdomen pelvis demonstrates few metallic appearing densities in her right colon.  Unclear if these could be causing her symptoms but given her persistent abdominal pain did discuss with surgery who evaluated the patient and recommends observation this point in time.  Plan for serial abdominal exams and repeat imaging to evaluate for movement of a possible ingested foreign bodies.  Will cover empirically with Zosyn for possible urinary tract infection as well as intra-abdominal martha.  Discussed with hospitalist who agrees to admit the patient for observation at this point in time.      No diagnosis found.    Labs and Imaging:     I have reviewed the relevant laboratory and radiology studies    Procedures:  I was present for the key portions of this procedure: none    Lamont Daniels MD  [facility] EMERGENCY ROOM  [address] [facility] [id number]  [phone]       Lamont Daniels MD  12/15/23 0010

## 2023-12-14 NOTE — ED TRIAGE NOTES
Pt presents to the ED with c/o worsening LLQ pain for the past week. Denies any fevers, diarrhea, or emesis. Pt also having right hip pain that radiates in to right leg.

## 2023-12-14 NOTE — ED PROVIDER NOTES
EMERGENCY DEPARTMENT ENCOUNTER      NAME: Katja Long  AGE: 73 year old female  YOB: 1950  MRN: [id number]  EVALUATION DATE & TIME: No admission date for patient encounter.    PCP: Junito Saenz    ED PROVIDER: Maryjo Washburn PA-C      Chief Complaint   Patient presents with    Abdominal Pain    Hip Pain     FINAL IMPRESSION:  1. Acute UTI    2. Foreign body in colon, initial encounter        ED COURSE & MEDICAL DECISION MAKING:    Pertinent Labs & Imaging studies reviewed. (See chart for details)  73 year old female presents to the Emergency Department for evaluation of left lower quadrant abdominal pain for 1 week.  Patient also complains of right buttock pain that radiates down her right leg which has been constant for the last month.  Patient family reports that due to her right buttock pain she has not been eating and has not been active.  Vital signs reviewed and unremarkable.  Afebrile.  On exam GCS is 15.  Cranial nerves II through XII intact.  Patient is tender to palpation in the left lower quadrant.  No rash.  Right buttock is mildly tender to palpation.  All 4 extremities are nontender to palpation.  Normal range of motion and strength of all 4 extremities.  Decreased sensation of the right lower lateral leg.  Remainder of sensation is normal.  Pulses are 2+ bilaterally.  No overlying erythema, edema, ecchymosis.  No lacerations or abrasions.  No warmth. Spine is nontender to palpation.    Differential diagnosis includes diverticulitis, appendicitis, colitis, small bowel obstruction, stone, UTI, pyelonephritis.  Sciatica, stenosis, disc herniation was considered for patient's chronic buttock pain.  X-ray of the pelvis shows normal joint spaces and alignment.  No fracture.  CT of the lumbar spine shows no acute lumbar spine fracture.  Moderate or severe canal stenosis at L4-L5.  Severe right L4-L5 and bilateral L5-S1 foraminal stenosis.  Low suspicion for cauda equina at this time.  CBC  shows a white blood cell count of 13.9.  Hemoglobin is stable.  Basic is reassuring.  Lipase and hepatic panel are reassuring.  UA shows leukocytes.  No nitrates.  Urine culture is pending.  CT of the abdomen shows no renal calculi, inflammatory changes or bowel obstruction.  Trace free fluid in the cul-de-sac questionable significance as patient is postmenopausal.  No adnexal masses.  Bilateral syndrome goal bronchiolectasis in the lung bases with area of the peribronchial thickening and retained secretions.  No evidence of pneumonia.  Few punctate metallic densities in the right colon of reflecting something ingested.  Postcholecystectomy distention of the biliary tree.  I spoke with general surgery about the CT findings.  Dr. Pina from general surgery would like the patient to be admitted for serial imaging and abdominal exams to insure patient does not develop a perforation.  We will start the patient on Zosyn for her UTI.  Patient received tylenol for her pain. Patient is resting comfortably. I spoke with Dr. Quiroz who agrees to admit the patient.  Patient agrees with plan.  All questions answered.      ED COURSE:   4:53 PM I saw the patient.   5:33 PM I staffed the patient with Dr. Daniels.  9:32 PM I spoke with Dr. Pina, general surgery. Plan to admit patient.  9:42 PM I spoke with Dr. Quiroz, hospitalist. Patient admitted.  9:44 PM I have to the patient treatment plan.  Patient with plan.  All questions answered.       At the conclusion of the encounter I discussed the results of all of the tests and the disposition. The questions were answered. The patient or family acknowledged understanding and was agreeable with the care plan.     0 minutes of critical care time       Additional Documentation    History:  Supplemental history from: Family Member/Significant Other  External Record(s) reviewed: Documented in chart, if applicable.    Work Up:  Chart documentation includes differential considered and any  EKGs or imaging interpreted by provider.  In additional to work up documented, I considered the following work up: Documented in chart, if applicable.    External consultation:  Discussion of management with another provider: Documented in chart, if applicable    Complicating factors:  Care impacted by chronic illness: Hypertension  Care affected by social determinants of health: N/A    Disposition considerations: Admit.      MEDICATIONS GIVEN IN THE EMERGENCY:  Medications   piperacillin-tazobactam (ZOSYN) 3.375 g vial to attach to  mL bag (has no administration in time range)   iopamidol (ISOVUE-370) solution 90 mL (90 mLs Intravenous $Given 12/14/23 1913)   acetaminophen (TYLENOL) tablet 650 mg (650 mg Oral $Given 12/14/23 2050)       NEW PRESCRIPTIONS STARTED AT TODAY'S ER VISIT  New Prescriptions    No medications on file       =================================================================    HPI    Patient information was obtained from: Patient and patient's daughter    Use of : Patient's daughter interpreted for the patient.        Katja Long is a 73 year old female with a pertinent history of HTN and sciatica who presents to this ED by private vehicle for evaluation of abdominal pain and right hip pain.    The patient endorses LLQ abdominal pain for the past week that radiates over her entire abdomen. When the abdominal pain is severe, the pain will take her breath away and she will break out into sweats. She denies shortness of breath. Patient vomited twice when her abdominal pain began last week but has not vomited since. She denies fever, chills, cough, congestion, nausea, diarrhea, blood in stool, chest pain, lightheadedness, saddle anesthesia, dizziness, or urinary incontinence.     The patient also endorses right hip and right buttock pain that radiates down her right leg with decreased sensation in her right lateral lower leg. Due to the pain, she has been walking and eating less.  These symptoms have been ongoing for the past month. Patient was seen at urgent care for this and was diagnosed with sciatica. She has not been seen by her PCP for a follow up because she was late to her appointment on 12/11 and has not been able to reschedule. Patient has not had any recent falls but uses a cane and walker at home. She denies any abdominal surgeries but does have a history of kidney stones.      REVIEW OF SYSTEMS   Review of Systems   Constitutional:  Negative for chills and fever.   HENT:  Negative for congestion.    Respiratory:  Negative for cough.    Cardiovascular:  Negative for chest pain.   Gastrointestinal:  Positive for abdominal pain. Negative for blood in stool, diarrhea and nausea.   Genitourinary:         Negative for urinary incontinence.   Musculoskeletal:         Positive for right hip and right buttock pain radiating down right leg.   Neurological:  Negative for dizziness and light-headedness.        Negative for saddle anesthesia.   All other systems reviewed and are negative.       PAST MEDICAL HISTORY:  Past Medical History:   Diagnosis Date    Hypertension     Kidney stone        PAST SURGICAL HISTORY:  Past Surgical History:   Procedure Laterality Date    EYE SURGERY      HC REMOVAL GALLBLADDER      Description: Cholecystectomy;  Recorded: 03/14/2014;           CURRENT MEDICATIONS:    cholecalciferol 50 MCG (2000 UT) CAPS  GOODSENSE ARTHRITIS PAIN 650 MG CR tablet  ketotifen fumarate 0.035%, ketotifen 0.025%, (ZADITOR) 0.025 % ophthalmic solution  famotidine (PEPCID) 40 MG tablet  gabapentin (NEURONTIN) 300 MG capsule  lisinopril-hydrochlorothiazide (PRINZIDE,ZESTORETIC) 10-12.5 mg per tablet  omeprazole (PRILOSEC) 40 MG capsule  tiZANidine (ZANAFLEX) 2 MG tablet  VENTOLIN HFA 90 mcg/actuation inhaler        ALLERGIES:  No Known Allergies    FAMILY HISTORY:  History reviewed. No pertinent family history.    SOCIAL HISTORY:   Social History     Socioeconomic History    Marital  status:      Spouse name: None    Number of children: None    Years of education: None    Highest education level: None   Tobacco Use    Smoking status: Never    Smokeless tobacco: Never   Substance and Sexual Activity    Alcohol use: No    Drug use: No       VITALS:  BP (!) 153/65   Pulse 60   Temp 97.8  F (36.6  C) (Temporal)   Resp 18   Ht 1.524 m (5')   Wt 44.5 kg (98 lb)   SpO2 95%   BMI 19.14 kg/m      PHYSICAL EXAM    Physical Exam  Vitals and nursing note reviewed.   Constitutional:       General: She is not in acute distress.     Appearance: Normal appearance. She is not ill-appearing, toxic-appearing or diaphoretic.   HENT:      Head: Atraumatic.      Right Ear: External ear normal.      Left Ear: External ear normal.      Nose: Nose normal.      Mouth/Throat:      Mouth: Mucous membranes are moist.   Eyes:      General: No visual field deficit.     Conjunctiva/sclera: Conjunctivae normal.      Pupils: Pupils are equal, round, and reactive to light.   Cardiovascular:      Rate and Rhythm: Normal rate and regular rhythm.      Pulses: Normal pulses.      Heart sounds: Normal heart sounds. No murmur heard.     No friction rub. No gallop.   Pulmonary:      Effort: Pulmonary effort is normal.      Breath sounds: Normal breath sounds. No wheezing or rales.   Abdominal:      General: Abdomen is flat. Bowel sounds are normal.      Palpations: Abdomen is soft.      Tenderness: There is abdominal tenderness in the left lower quadrant. There is no right CVA tenderness, left CVA tenderness, guarding or rebound.   Musculoskeletal:      Cervical back: Normal range of motion.      Comments: Cervical, thoracic, lumbar, sacral paraspinal muscles and spinous process are nontender to palpation.  Normal range of motion, and strength of bilateral upper and lower extremities.  Patient reports that she has decreased sensation to the lateral aspect of the right lower leg.  Remainder of sensation is intact.  No  saddle anesthesia.  No incontinence.  Pulses are 2+ bilaterally.  No overlying erythema, edema, ecchymosis.  No lacerations or abrasions.  No warmth.   Skin:     General: Skin is dry.   Neurological:      General: No focal deficit present.      Mental Status: She is alert and oriented to person, place, and time. Mental status is at baseline.      GCS: GCS eye subscore is 4. GCS verbal subscore is 5. GCS motor subscore is 6.      Cranial Nerves: No cranial nerve deficit or facial asymmetry.      Motor: Motor function is intact. No weakness or pronator drift.      Coordination: Coordination is intact. Finger-Nose-Finger Test and Heel to Shin Test normal.      Gait: Gait is intact.   Psychiatric:         Mood and Affect: Mood normal.         Thought Content: Thought content normal.          LAB:  All pertinent labs reviewed and interpreted.  Labs Ordered and Resulted from Time of ED Arrival to Time of ED Departure   BASIC METABOLIC PANEL - Abnormal       Result Value    Sodium 136      Potassium 3.8      Chloride 100      Carbon Dioxide (CO2) 27      Anion Gap 9      Urea Nitrogen 20.5      Creatinine 0.91      GFR Estimate 67      Calcium 9.9      Glucose 108 (*)    ROUTINE UA WITH MICROSCOPIC REFLEX TO CULTURE - Abnormal    Color Urine Colorless      Appearance Urine Clear      Glucose Urine Negative      Bilirubin Urine Negative      Ketones Urine Negative      Specific Gravity Urine 1.028      Blood Urine Negative      pH Urine 6.0      Protein Albumin Urine Negative      Urobilinogen Urine <2.0      Nitrite Urine Negative      Leukocyte Esterase Urine 250 Sherice/uL (*)     RBC Urine <1      WBC Urine 12 (*)     Squamous Epithelials Urine <1     CBC WITH PLATELETS AND DIFFERENTIAL - Abnormal    WBC Count 13.9 (*)     RBC Count 4.24      Hemoglobin 11.7      Hematocrit 35.4      MCV 84      MCH 27.6      MCHC 33.1      RDW 12.7      Platelet Count 336      % Neutrophils 71      % Lymphocytes 20      % Monocytes 6       % Eosinophils 2      % Basophils 0      % Immature Granulocytes 1      NRBCs per 100 WBC 0      Absolute Neutrophils 9.8 (*)     Absolute Lymphocytes 2.8      Absolute Monocytes 0.8      Absolute Eosinophils 0.3      Absolute Basophils 0.1      Absolute Immature Granulocytes 0.1      Absolute NRBCs 0.0     LIPASE - Normal    Lipase 17     HEPATIC FUNCTION PANEL - Normal    Protein Total 6.5      Albumin 3.7      Bilirubin Total 0.3      Alkaline Phosphatase 88      AST 12      ALT 11      Bilirubin Direct <0.20     URINE CULTURE        RADIOLOGY:  Reviewed all pertinent imaging. Please see official radiology report.  XR Pelvis and Hip Right 2 Views   Final Result   IMPRESSION: Normal joint spaces and alignment. No fracture. Residual contrast material in the bladder.      CT Lumbar Spine Reconstructed   Final Result   IMPRESSION:   1.  No acute lumbar spine fracture.   2.  Moderate to severe canal stenosis at L4-L5.   3.  Severe right L4-L5 and bilateral L5-S1 foraminal stenosis.      CT Abdomen Pelvis w Contrast   Final Result   IMPRESSION:    1.  No abnormalities are seen to explain symptoms.   2.  Specifically, no renal calculi, inflammatory changes or bowel obstruction.   3.  Trace free fluid in the cul-de-sac of questionable significance as patient is postmenopausal. No adnexal masses.   4.  Bilateral cylindrical bronchiectasis in the lung bases with areas of the peribronchial thickening and retained secretions. No evidence of pneumonia.   5.  Few punctate metallic densities in the right colon reflect something ingested.   6.  Postcholecystectomy distention of the biliary tree.          I, Margo Ruiz, am serving as a scribe to document services personally performed by Maryjo Washburn PA-C, based on my observation and the provider's statements to me. I, Maryjo Washburn PA-C, attest that Margo Ruiz is acting in a scribe capacity, has observed my performance of the services and has documented them in  accordance with my direction.    Maryjo Washburn PA-C  [facility] EMERGENCY ROOM  [address] [address] [phone]     Maryjo Washburn PA-C  12/14/23 [id number]

## 2023-12-15 ENCOUNTER — APPOINTMENT (OUTPATIENT)
Dept: RADIOLOGY | Facility: CLINIC | Age: 73
End: 2023-12-15
Attending: PHYSICIAN ASSISTANT
Payer: COMMERCIAL

## 2023-12-15 VITALS
TEMPERATURE: 98.2 F | RESPIRATION RATE: 16 BRPM | HEIGHT: 60 IN | BODY MASS INDEX: 19.24 KG/M2 | DIASTOLIC BLOOD PRESSURE: 72 MMHG | WEIGHT: 98 LBS | SYSTOLIC BLOOD PRESSURE: 168 MMHG | HEART RATE: 68 BPM | OXYGEN SATURATION: 98 %

## 2023-12-15 PROBLEM — T18.4XXA FOREIGN BODY IN COLON, INITIAL ENCOUNTER: Status: ACTIVE | Noted: 2023-12-15

## 2023-12-15 LAB
ERYTHROCYTE [DISTWIDTH] IN BLOOD BY AUTOMATED COUNT: 12.7 % (ref 10–15)
HCT VFR BLD AUTO: 38.7 % (ref 35–47)
HGB BLD-MCNC: 12.5 G/DL (ref 11.7–15.7)
MCH RBC QN AUTO: 27.2 PG (ref 26.5–33)
MCHC RBC AUTO-ENTMCNC: 32.3 G/DL (ref 31.5–36.5)
MCV RBC AUTO: 84 FL (ref 78–100)
PLATELET # BLD AUTO: 324 10E3/UL (ref 150–450)
RBC # BLD AUTO: 4.59 10E6/UL (ref 3.8–5.2)
WBC # BLD AUTO: 12.2 10E3/UL (ref 4–11)

## 2023-12-15 PROCEDURE — 99239 HOSP IP/OBS DSCHRG MGMT >30: CPT | Performed by: INTERNAL MEDICINE

## 2023-12-15 PROCEDURE — G0378 HOSPITAL OBSERVATION PER HR: HCPCS

## 2023-12-15 PROCEDURE — 74018 RADEX ABDOMEN 1 VIEW: CPT

## 2023-12-15 PROCEDURE — 250N000013 HC RX MED GY IP 250 OP 250 PS 637: Performed by: INTERNAL MEDICINE

## 2023-12-15 PROCEDURE — 999N000111 HC STATISTIC OT IP EVAL DEFER

## 2023-12-15 PROCEDURE — 85014 HEMATOCRIT: CPT | Performed by: INTERNAL MEDICINE

## 2023-12-15 PROCEDURE — 36415 COLL VENOUS BLD VENIPUNCTURE: CPT | Performed by: INTERNAL MEDICINE

## 2023-12-15 RX ORDER — LIDOCAINE 4 G/G
2 PATCH TOPICAL EVERY 24 HOURS
Qty: 20 PATCH | Refills: 0 | Status: SHIPPED | OUTPATIENT
Start: 2023-12-15 | End: 2024-10-04

## 2023-12-15 RX ORDER — LIDOCAINE 4 G/G
2 PATCH TOPICAL
Status: DISCONTINUED | OUTPATIENT
Start: 2023-12-15 | End: 2023-12-15 | Stop reason: HOSPADM

## 2023-12-15 RX ORDER — AMOXICILLIN 250 MG
2 CAPSULE ORAL 2 TIMES DAILY PRN
Status: DISCONTINUED | OUTPATIENT
Start: 2023-12-15 | End: 2023-12-15 | Stop reason: HOSPADM

## 2023-12-15 RX ORDER — ACETAMINOPHEN 325 MG/1
650 TABLET ORAL EVERY 4 HOURS PRN
Status: DISCONTINUED | OUTPATIENT
Start: 2023-12-15 | End: 2023-12-15 | Stop reason: HOSPADM

## 2023-12-15 RX ORDER — ACETAMINOPHEN 325 MG/1
975 TABLET ORAL 3 TIMES DAILY
Status: DISCONTINUED | OUTPATIENT
Start: 2023-12-15 | End: 2023-12-15 | Stop reason: HOSPADM

## 2023-12-15 RX ORDER — LISINOPRIL/HYDROCHLOROTHIAZIDE 10-12.5 MG
1 TABLET ORAL EVERY EVENING
Status: CANCELLED | OUTPATIENT
Start: 2023-12-15

## 2023-12-15 RX ORDER — GABAPENTIN 300 MG/1
300 CAPSULE ORAL AT BEDTIME
Status: CANCELLED | OUTPATIENT
Start: 2023-12-15

## 2023-12-15 RX ORDER — FAMOTIDINE 20 MG/1
40 TABLET, FILM COATED ORAL EVERY EVENING
Status: CANCELLED | OUTPATIENT
Start: 2023-12-15

## 2023-12-15 RX ORDER — AMOXICILLIN 250 MG
1 CAPSULE ORAL 2 TIMES DAILY PRN
Status: DISCONTINUED | OUTPATIENT
Start: 2023-12-15 | End: 2023-12-15 | Stop reason: HOSPADM

## 2023-12-15 RX ORDER — ONDANSETRON 2 MG/ML
4 INJECTION INTRAMUSCULAR; INTRAVENOUS EVERY 6 HOURS PRN
Status: DISCONTINUED | OUTPATIENT
Start: 2023-12-15 | End: 2023-12-15 | Stop reason: HOSPADM

## 2023-12-15 RX ORDER — ACETAMINOPHEN 650 MG/1
650 SUPPOSITORY RECTAL EVERY 4 HOURS PRN
Status: DISCONTINUED | OUTPATIENT
Start: 2023-12-15 | End: 2023-12-15 | Stop reason: HOSPADM

## 2023-12-15 RX ORDER — ALBUTEROL SULFATE 90 UG/1
1-2 AEROSOL, METERED RESPIRATORY (INHALATION) EVERY 4 HOURS PRN
Status: CANCELLED | OUTPATIENT
Start: 2023-12-15

## 2023-12-15 RX ORDER — ONDANSETRON 4 MG/1
4 TABLET, ORALLY DISINTEGRATING ORAL EVERY 6 HOURS PRN
Status: DISCONTINUED | OUTPATIENT
Start: 2023-12-15 | End: 2023-12-15 | Stop reason: HOSPADM

## 2023-12-15 RX ADMIN — LIDOCAINE 2 PATCH: 4 PATCH TOPICAL at 11:02

## 2023-12-15 RX ADMIN — ACETAMINOPHEN 975 MG: 325 TABLET ORAL at 11:01

## 2023-12-15 ASSESSMENT — ACTIVITIES OF DAILY LIVING (ADL)
ADLS_ACUITY_SCORE: 36
ADLS_ACUITY_SCORE: 37
ADLS_ACUITY_SCORE: 36

## 2023-12-15 NOTE — DISCHARGE SUMMARY
[facility]  Hospitalist Discharge Summary      Date of Admission:  12/14/2023  Date of Discharge:  12/15/2023  Discharging Provider: Davide Ramirez DO  Discharge Service: Hospitalist Service    Discharge Diagnoses   Metallic foreign body in colon  Left lower quadrant abdominal pain  Essential hypertension  Spinal stenosis of lumbar spine with lumbar radiculopathy  Gastroesophageal reflux disease    Clinically Significant Risk Factors          Follow-ups Needed After Discharge   Follow-up Appointments     Follow-up and recommended labs and tests       Follow up with primary care provider, NELIA MALONE, within 7 days for   hospital follow- up, abdominal pain, lumbar radiculopathy.  The following   labs/tests are recommended: CBC.    Follow up with [facility] Neurosurgery, within 2 to 4 weeks.   regarding new diagnosis of spinal stenosis and lumbar radiculopathy.            Unresulted Labs Ordered in the Past 30 Days of this Admission       Date and Time Order Name Status Description    12/14/2023  8:44 PM Urine Culture In process         These results will be followed up by PCP, [facility]    Discharge Disposition   Discharged to home AMA  Condition at discharge: Stable    Hospital Course   73-year-old female with history of hypertension, cholecystectomy who presented with complaints of left lower quadrant abdominal pain and low back pain with associated right lower leg pain.  CT findings revealed metallic foreign bodies in the colon.  L4-5 and L5-1 severe canal stenosis and foraminal stenosis.  Patient indicated on history that she does ingest game and animals that her son's hunt.  She recently ingested squirrel meat.  They do use shotguns to hunt.  Metallic foreign bodies are consistent with shot.  Patient did provide stool sample for enteric pathogen and parasitology exam.  She denied any hematochezia or melena.  No nausea or vomiting.  No fever.  She denied dysuria or difficulty  voiding.  She denied focal muscle weakness in right lower extremity.  She does have some residual numbness in right anterior tibial region and calf.  She denies bowel or bladder incontinence.  No saddle paresthesias.  Pain right now is well-controlled with Tylenol and gabapentin.  Patient indicates that her son recently passed away and that his  is on 2023.  Patient was vitally stable.  However given her presenting symptoms I recommended continued admission for rule out of infectious etiologies and general surgery evaluation for metallic objects.      The patient has decided to leave AMA.  The patient has a normal mental status examination and understands their condition and the risks of leaving are worsening pain, sepsis, as well as including permanent disability and/or death.  The patient has had an opportunity to ask questions about their medical condition.  The patient has been informed that they may return for care at any time, and has been referred to their primary care provider Vaughn Moreau as soon as possible.  Stereomood  was utilized during our discussion.      Consultations This Hospital Stay   CARE MANAGEMENT / SOCIAL WORK IP CONSULT  PHYSICAL THERAPY ADULT IP CONSULT  OCCUPATIONAL THERAPY ADULT IP CONSULT  PAIN MANAGEMENT ADULT IP CONSULT  NEUROSURGERY IP CONSULT    Code Status   No CPR- Do NOT Intubate    Time Spent on this Encounter   IDavide DO, personally saw the patient today and spent greater than 30 minutes discharging this patient.       Davide Ramirez DO  [facility] EMERGENCY ROOM  [address] [place] [id number]  Phone: [phone]  Fax: [phone]  ______________________________________________________________________    Physical Exam   Vital Signs: Temp: 98.2  F (36.8  C) Temp src: Oral BP: (!) 168/72 Pulse: 68   Resp: 16 SpO2: 98 % O2 Device: None (Room air)    Weight: 98 lbs 0 oz  General Appearance: No  apparent distress, well-nourished lauri female  Respiratory: Clear to auscultation bilaterally no wheeze rales rhonchi  Cardiovascular: Rate and rhythm no murmur rub or gallop  GI: Tenderness over left lateral and left lower quadrant abdomen.  Nondistended, normoactive bowel sounds, no guarding or rebound tenderness.  Skin: Skin is warm and dry to touch no exanthem on exposed skin.  Other: Awake alert oriented x 3.  General sensation is intact in all extremities excluding right anterior tibial area.  5/5 muscle strength in hip flexion dorsiflexion and plantarflexion bilaterally.       Primary Care Physician   NELIA MALONE    Discharge Orders      Physical Therapy Referral      Reason for your hospital stay    Abdominal pain, lumbar radiculopathy, spinal stenosis.     Follow-up and recommended labs and tests     Follow up with primary care provider, NELIA MALONE, within 7 days for hospital follow- up, abdominal pain, lumbar radiculopathy.  The following labs/tests are recommended: CBC.    Follow up with [facility] Neurosurgery, within 2 to 4 weeks. regarding new diagnosis of spinal stenosis and lumbar radiculopathy.     Activity    Your activity upon discharge: activity as tolerated     When to contact your care team    Call your primary doctor if you have any of the following: temperature greater than 100.4 degrees, increased swelling, or increased pain.     Diet    Follow this diet upon discharge: Orders Placed This Encounter      Regular Diet Adult       Significant Results and Procedures   Most Recent 3 CBC's:  Recent Labs   Lab Test 12/14/23  1808 06/11/22  1409 01/25/20  2034   WBC 13.9* 8.7 10.5   HGB 11.7 14.5 13.2   MCV 84 81 84    219 216     Most Recent 3 BMP's:  Recent Labs   Lab Test 12/14/23  1808 06/11/22  1409 01/25/20  2049    128* 137   POTASSIUM 3.8 3.5 3.5   CHLORIDE 100 87* 103   CO2 27 29 23   BUN 20.5 15 19   CR 0.91 0.80 0.80   ANIONGAP 9 12 11   BANG 9.9 10.1 9.0   *  112 113     Most Recent 2 LFT's:  Recent Labs   Lab Test 12/14/23 1808 01/25/20  2049   AST 12 21   ALT 11 12   ALKPHOS 88 90   BILITOTAL 0.3 1.0     7-Day Micro Results       Collected Updated Procedure Result Status      12/14/2023 2217 12/14/2023 2252 Asymptomatic COVID-19 Virus (Coronavirus) by PCR Nasopharyngeal [[id number]]    Swab from Nasopharyngeal    Final result Component Value   SARS CoV2 PCR Negative   NEGATIVE: SARS-CoV-2 (COVID-19) RNA not detected, presumed negative.            12/14/2023 2022 12/14/2023 2044 Urine Culture [[id number]]   Urine, Clean Catch    In process Component Value   No component results                     Most Recent Urinalysis:  Recent Labs   Lab Test 12/14/23  2022 05/10/18  0624   COLOR Colorless Yellow   APPEARANCE Clear Clear   URINEGLC Negative Negative   URINEBILI Negative Negative   URINEKETONE Negative Trace*   SG 1.028 1.007   UBLD Negative Negative   URINEPH 6.0 5.5   PROTEIN Negative Negative   UROBILINOGEN  --  <2.0 E.U./dL   NITRITE Negative Negative   LEUKEST 250 Sherice/uL* Moderate*   RBCU <1 0-2   WBCU 12* 10-25*   ,   Results for orders placed or performed during the hospital encounter of 12/14/23   CT Abdomen Pelvis w Contrast    Narrative    EXAM: CT ABDOMEN PELVIS W CONTRAST  LOCATION: [facility]  DATE: 12/14/2023    INDICATION: LLQ pain  COMPARISON: 07/27/2017  TECHNIQUE: CT scan of the abdomen and pelvis was performed following injection of IV contrast. Multiplanar reformats were obtained. Dose reduction techniques were used.  CONTRAST: Isovue 370 90mL    FINDINGS: Respiratory motion artifact.     LOWER CHEST: Cylindrical bronchiectasis in the right middle lobe and both lower lobes, right greater than left with minimal areas of peribronchial thickening and mild retained secretions. No effusions. Calcified granulomas in the left lower lobe. LV   enlargement.    HEPATOBILIARY: Post cholecystectomy distention of the common bile  duct. No ductal stones. No liver lesions.    PANCREAS: Normal.    SPLEEN: Normal.    ADRENAL GLANDS: Normal.    KIDNEYS/BLADDER: Multiple cortical scarring superiorly in the right kidney. No calculi or obstruction.    BOWEL: No inflammatory changes. Bowel is of normal caliber. There are a few metallic foreign bodies in the right colon.    LYMPH NODES: Normal.    VASCULATURE: Mild arterial calcifications throughout. No aneurysm.    PELVIC ORGANS: Trace free fluid in the cul-de-sac. Benign calcified nodule dependently in the pelvis again noted No adnexal masses.    MUSCULOSKELETAL: Mild degenerative changes L4-S1. No fractures or suspicious lesions.      Impression    IMPRESSION:   1.  No abnormalities are seen to explain symptoms.  2.  Specifically, no renal calculi, inflammatory changes or bowel obstruction.  3.  Trace free fluid in the cul-de-sac of questionable significance as patient is postmenopausal. No adnexal masses.  4.  Bilateral cylindrical bronchiectasis in the lung bases with areas of the peribronchial thickening and retained secretions. No evidence of pneumonia.  5.  Few punctate metallic densities in the right colon reflect something ingested.  6.  Postcholecystectomy distention of the biliary tree.   XR Pelvis and Hip Right 2 Views    Narrative    EXAM: XR PELVIS AND HIP RIGHT 2 VIEWS  LOCATION: [facility]  DATE: 12/14/2023    INDICATION: right buttock pain  COMPARISON: None.      Impression    IMPRESSION: Normal joint spaces and alignment. No fracture. Residual contrast material in the bladder.   CT Lumbar Spine Reconstructed    Narrative    EXAM: CT LUMBAR SPINE RECONSTRUCTED  LOCATION: [facility]  DATE: 12/14/2023    INDICATION:  Sciatic back pain  COMPARISON:  CT abdomen pelvis 07/27/2017.  TECHNIQUE: Routine CT Lumbar Spine without IV contrast. Multiplanar reformats. Dose reduction techniques were used.     FINDINGS:  VERTEBRA: Normal vertebral  body heights. Straightening of the usual lumbar lordosis. Mild levocurvature. No acute compression fracture or posttraumatic subluxation.     CANAL/FORAMINA: Diffuse bulge at L4-L5 with moderate to severe canal stenosis. Severe right L4-L5 and bilateral L5-S1 foraminal stenosis.    PARASPINAL: No acute extraspinal abnormality.       Impression    IMPRESSION:  1.  No acute lumbar spine fracture.  2.  Moderate to severe canal stenosis at L4-L5.  3.  Severe right L4-L5 and bilateral L5-S1 foraminal stenosis.   XR Abdomen 1 View    Narrative    EXAM: XR ABDOMEN 1 VIEW  LOCATION: [facility]  DATE: 12/15/2023    INDICATION: metalic bodies in right colon. looking for progression.  COMPARISON: CT 12/14/2023.      Impression    IMPRESSION: Compared to the CT of 12/14/2023, interval leftward and inferior migration of the metallic foreign bodies suggesting they may currently be in the transverse colon. Nonobstructive bowel gas pattern. Cholecystectomy clips. Excreted contrast in   the urinary bladder. Vascular calcifications.       Discharge Medications   Current Discharge Medication List        START taking these medications    Details   Lidocaine (LIDOCARE) 4 % Patch Place 2 patches onto the skin every 24 hours To prevent lidocaine toxicity, patient should be patch free for 12 hrs daily.  Qty: 20 patch, Refills: 0    Associated Diagnoses: Lumbar radiculopathy           CONTINUE these medications which have NOT CHANGED    Details   cholecalciferol 50 MCG (2000 UT) CAPS Take 50 mcg by mouth every evening      famotidine (PEPCID) 40 MG tablet Take 40 mg by mouth every evening      gabapentin (NEURONTIN) 300 MG capsule Take 300 mg by mouth every evening as needed for neuropathic pain      GOODSENSE ARTHRITIS PAIN 650 MG CR tablet Take 650 mg by mouth every 8 hours as needed for mild pain      ketotifen fumarate 0.035%, ketotifen 0.025%, (ZADITOR) 0.025 % ophthalmic solution Place 1 drop into both eyes 2  times daily as needed for itching      lisinopril-hydrochlorothiazide (PRINZIDE,ZESTORETIC) 10-12.5 mg per tablet Take 1 tablet by mouth every evening  Refills: 4      VENTOLIN HFA 90 mcg/actuation inhaler [VENTOLIN HFA 90 MCG/ACTUATION INHALER] Inhale 1-2 puffs every 4 (four) hours as needed.  Refills: 1           Allergies   No Known Allergies

## 2023-12-15 NOTE — H&P
[facility]    History and Physical - Hospitalist Service       Date of Admission:  12/14/2023    Assessment & Plan      Katja Long is a 73 year old female presents with a one week history of abdominal pain.  She is clinically stable.  No fevers.  Mild leukocytosis.  The etiology of her abdominal pain is no currently clear.  She has no associated urinary symptoms or imaging findings to support a UTI.  The location ofa abdominal pain on exam is not typical for cystitis.  Therefore, the UA reflects asymptomatic pyuria.  No indication for antibiotics.  The metallic objects on imaging do not correlate with the location of her abdominal pain either.  General surgery evaluated patient.  Plan for monitoring overnight and repeat imaging in the morning.  There was report from the ED provider that antibiotics were ordered for prophylaxis against peritonitis.  This is not reflected in the general surgery note.  Hold abx at this time.    # LLQ abdominal pain  # Metallic objects in right colon  - monitor overnight  - repeat imaging in morning  - general surgery following    # HTN   - awaiting med rec          Diet: Regular Diet Adult  Mendoza Catheter: Not present  Lines: None     Cardiac Monitoring: None  Code Status: No CPR- Do NOT Intubate    Clinically Significant Risk Factors Present on Admission                  # Hypertension: Noted on problem list                 Disposition Plan           Celso Quiroz MD  Hospitalist Service  [facility]  Securely message with [employer] (more info)  Text page via [employer] Paging/Directory     ______________________________________________________________________    Chief Complaint   Abdominal pain    History is obtained from the patient and daughter  Hmong  used    History of Present Illness   Katja Long is a 73 year old female who presents with a one week history of abdominal pain.  She reports she had vomiting associated with  abdominal pain.  Abdominal pain started last Wednesday.  The pain is in the LLQ and radiates across abdomen.  There is no change related to position or with oral intake.  The pain waxes and wanes and is improved with pressure.  Denies fevers, chills, chest pain, dyspnea, burning with urination, or a change in urinary frequency.    She has also had leg pain since Nov 4, which is being managed by a doctor.  She was prescribed medication which improved the pain.  She has been able to ambulate.  Her daughter reports this pain waxes and wanes.      Past Medical History    Past Medical History:   Diagnosis Date    Hypertension     Kidney stone        Past Surgical History   Past Surgical History:   Procedure Laterality Date    EYE SURGERY      HC REMOVAL GALLBLADDER      Description: Cholecystectomy;  Recorded: 03/14/2014;       Prior to Admission Medications   Prior to Admission Medications   Prescriptions Last Dose Informant Patient Reported? Taking?   GOODSENSE ARTHRITIS PAIN 650 MG CR tablet   Yes Yes   Sig: Take 650 mg by mouth every 8 hours as needed for mild pain   VENTOLIN HFA 90 mcg/actuation inhaler   Yes No   Sig: [VENTOLIN HFA 90 MCG/ACTUATION INHALER] Inhale 1-2 puffs every 4 (four) hours as needed.   cholecalciferol 50 MCG (2000 UT) CAPS   Yes Yes   Sig: Take 50 mcg by mouth daily   famotidine (PEPCID) 40 MG tablet   Yes No   Sig: Take 40 mg by mouth daily   gabapentin (NEURONTIN) 300 MG capsule   Yes No   Sig: Take 300 mg by mouth daily as needed for neuropathic pain   ketotifen fumarate 0.035%, ketotifen 0.025%, (ZADITOR) 0.025 % ophthalmic solution   Yes Yes   Sig: Place 1 drop into both eyes 2 times daily as needed for itching   lisinopril-hydrochlorothiazide (PRINZIDE,ZESTORETIC) 10-12.5 mg per tablet   Yes No   Sig: [LISINOPRIL-HYDROCHLOROTHIAZIDE (PRINZIDE,ZESTORETIC) 10-12.5 MG PER TABLET] Take 1 tablet by mouth daily.   omeprazole (PRILOSEC) 40 MG capsule   Yes No   Sig: [OMEPRAZOLE (PRILOSEC) 40 MG  CAPSULE] Take 40 mg by mouth daily.   tiZANidine (ZANAFLEX) 2 MG tablet   Yes No   Sig: Take 2 mg by mouth 3 times daily as needed for muscle spasms      Facility-Administered Medications: None           Physical Exam   Vital Signs: Temp: 97.8  F (36.6  C) Temp src: Temporal BP: (!) 156/69 Pulse: 63   Resp: 18 SpO2: 96 %      Weight: 98 lbs 0 oz    Gen:  lying in bed in no extremis  Neuro: alert, conversant  CV:  nl rate, regular rhythm  Pulm: no acute resp distress, CTAB anteriorly  GI:  abdomen soft, non distended, mild left sided TTP    Medical Decision Making             Data     Reviewed:    Na 136  K 3.8  BUN 21  Cr 0.91    WBC 14  Hgb 12  Plts 336      CT ab/pelvis  IMPRESSION:   1.  No abnormalities are seen to explain symptoms.  2.  Specifically, no renal calculi, inflammatory changes or bowel obstruction.  3.  Trace free fluid in the cul-de-sac of questionable significance as patient is postmenopausal. No adnexal masses.  4.  Bilateral cylindrical bronchiectasis in the lung bases with areas of the peribronchial thickening and retained secretions. No evidence of pneumonia.  5.  Few punctate metallic densities in the right colon reflect something ingested.  6.  Postcholecystectomy distention of the biliary tree.    CT L spine  IMPRESSION:  1.  No acute lumbar spine fracture.  2.  Moderate to severe canal stenosis at L4-L5.  3.  Severe right L4-L5 and bilateral L5-S1 foraminal stenosis.    XR pelvis  IMPRESSION: Normal joint spaces and alignment. No fracture. Residual contrast material in the bladder.

## 2023-12-15 NOTE — CONSULTS
Care Management Discharge Note    Discharge Date: 12/15/2023     Additional Information:  Chart reviewed. Pain Team, Suregery, Neurosurgery and PT/OT consults pending.     Per MD, the patient will be discharging AMA.    Alisa Valerio RN       independent

## 2023-12-15 NOTE — PROGRESS NOTES
OT: Orders received. Chart reviewed and discussed with RN.  Pt will be discharging AMA. OT can be deferred, per RN. Will complete orders.    -Jim Brandt, OTR/L

## 2023-12-15 NOTE — PROGRESS NOTES
Neurosurgery was consulted for lumbar spinal stenosis and lumbar radiculopathy, however Chart review reveals patient is leaving AMA.  Neurosurgery will not see.  We will update the ER.  Should patient stay, neurosurgery can be reconsulted.  Patient can also follow-up with neurosurgery clinic if interested in further workup and management of the above-mentioned conditions.    Monae Cormier PA-C  [facility] Neurosurgery  [address] [address]

## 2023-12-15 NOTE — CONSULTS
Generl Surgery Consult Note     Katja Long MRN# [id number]   YOB: 1950 Age: 73 year old      Date of Admission:  12/14/2023    Reason for Consult: Foreign object in colon  Consulting Service: ED  Consulting Physician: Wu PAC    Assessment/plan: Katja Long is a(n) 73 year old year old female with history of cholecystectomy, presents with LLQ pain, L hip pain.  WBC mildly elevated at 13.  UA positive.  CT with metallic bodies in R colon.  No free air.  Trace free fluid in pelvis.      On exam minimal tenderness in LLQ, no peritonitis.  WBC can be attributed to UTI.  With normal vitals, minimal pain on exam, I expect this should pass, and I do not attribute her pain to this.  Would be reasonable to check again with serial XR.    -------------------    HPI: Katja Long is a(n) 73 year old year old female presents with 1 week of LLQ pain.  Also hip pain.  No n/v.  No f/c.  Denies knowledge of ingesting foreign body.    Past Medical History:  Past Medical History:   Diagnosis Date    Hypertension     Kidney stone        Past Surgical History:  Past Surgical History:   Procedure Laterality Date    EYE SURGERY      HC REMOVAL GALLBLADDER      Description: Cholecystectomy;  Recorded: 03/14/2014;       Allergies:   No Known Allergies    Medications:  No current facility-administered medications on file prior to encounter.  acetaminophen (TYLENOL EXTRA STRENGTH) 500 MG tablet, [ACETAMINOPHEN (TYLENOL EXTRA STRENGTH) 500 MG TABLET] Take 2 tablets (1,000 mg total) by mouth every 6 (six) hours as needed for pain.  guaiFENesin ER (MUCINEX) 600 mg 12 hr tablet, [GUAIFENESIN ER (MUCINEX) 600 MG 12 HR TABLET] Take 1 tablet (600 mg total) by mouth 2 (two) times a day. For 5 days  ketotifen (ZADITOR/ZYRTEC ITCHY EYES) 0.025 % (0.035 %) ophthalmic solution, [KETOTIFEN (ZADITOR/ZYRTEC ITCHY EYES) 0.025 % (0.035 %) OPHTHALMIC SOLUTION] Administer 1 drop to both eyes 2 (two) times a day.   lisinopril-hydrochlorothiazide  (PRINZIDE,ZESTORETIC) 10-12.5 mg per tablet, [LISINOPRIL-HYDROCHLOROTHIAZIDE (PRINZIDE,ZESTORETIC) 10-12.5 MG PER TABLET] Take 1 tablet by mouth daily.  omeprazole (PRILOSEC) 40 MG capsule, [OMEPRAZOLE (PRILOSEC) 40 MG CAPSULE] Take 40 mg by mouth daily.  ROBAFEN DM  mg/5 mL liquid, [ROBAFEN DM  MG/5 ML LIQUID] Take 5 mL by mouth every 4 (four) hours as needed.   VENTOLIN HFA 90 mcg/actuation inhaler, [VENTOLIN HFA 90 MCG/ACTUATION INHALER] Inhale 1-2 puffs every 4 (four) hours as needed.        Social History:  Social History     Socioeconomic History    Marital status:      Spouse name: Not on file    Number of children: Not on file    Years of education: Not on file    Highest education level: Not on file   Occupational History    Not on file   Tobacco Use    Smoking status: Never    Smokeless tobacco: Never   Substance and Sexual Activity    Alcohol use: No    Drug use: No    Sexual activity: Not on file   Other Topics Concern    Not on file   Social History Narrative    Not on file     Social Determinants of Health     Financial Resource Strain: Not on file   Food Insecurity: Not on file   Transportation Needs: Not on file   Physical Activity: Not on file   Stress: Not on file   Social Connections: Not on file   Interpersonal Safety: Not on file   Housing Stability: Not on file       Family History:  History reviewed. No pertinent family history.    ROS:  As noted above. No headache, dizziness. No fever, chills. No chest pain or shortness of breath. No abdominal pain, nausea, vomiting. No diarrhea or constipation. No urinary difficulties. No muscle or body aches.    Exam:  BP (!) 153/65   Pulse 60   Temp 97.8  F (36.6  C) (Temporal)   Resp 18   Ht 1.524 m (5')   Wt 44.5 kg (98 lb)   SpO2 95%   BMI 19.14 kg/m    General: Alert, interactive, & in NAD  Resp: CTAB, no crackles or wheezes  Cardiac: Regular rate; extremities warm;   Abdomen: Soft, minimally tender in LLQ, nondistended.    Extremities: No LE edema or obvious joint abnormalities  Skin: Warm and dry, no jaundice or rash    Labs:  All laboratory data reviewed    Imaging:   Reviewed    --    James Pina MD      9:30 PM, 12/14/2023

## 2023-12-15 NOTE — PHARMACY-ADMISSION MEDICATION HISTORY
Pharmacist Admission Medication History    Admission medication history is complete. The information provided in this note is only as accurate as the sources available at the time of the update.    Information Source(s): Patient, Family member, Patient's pharmacy, and Clinic records via in-person    Pertinent Information:    - Pt had fills for apxiaban 2.5mg BID from June through August of this year, I can't find a specific reason why, her clinic list just lists cardiovascular health as the indication. Confirmed w/ both patient and family member that she is not taking this currently     Changes made to PTA medication list:  Added: Gabapentin  Deleted: None  Changed: None    Medication Affordability:       Allergies reviewed with patient and updates made in EHR: yes    Medication History Completed By: [facility] 12/14/2023 10:34 PM    PTA Med List   Medication Sig Last Dose    cholecalciferol 50 MCG (2000 UT) CAPS Take 50 mcg by mouth every evening 12/13/2023 at 2100    famotidine (PEPCID) 40 MG tablet Take 40 mg by mouth every evening 12/13/2023 at 2100    gabapentin (NEURONTIN) 300 MG capsule Take 300 mg by mouth every evening as needed for neuropathic pain Past Week    GOODSENSE ARTHRITIS PAIN 650 MG CR tablet Take 650 mg by mouth every 8 hours as needed for mild pain Past Week    ketotifen fumarate 0.035%, ketotifen 0.025%, (ZADITOR) 0.025 % ophthalmic solution Place 1 drop into both eyes 2 times daily as needed for itching Past Week    lisinopril-hydrochlorothiazide (PRINZIDE,ZESTORETIC) 10-12.5 mg per tablet Take 1 tablet by mouth every evening 12/13/2023 at 2000    VENTOLIN HFA 90 mcg/actuation inhaler [VENTOLIN HFA 90 MCG/ACTUATION INHALER] Inhale 1-2 puffs every 4 (four) hours as needed. Past Month

## 2023-12-15 NOTE — CONSULTS
Will not see today:  PAIN MANAGEMENT SERVICE CHART CHECK    This patient's chart has been reviewed by the Pain Service. Consult noted. Per notes it appears patient is leaving AMA. Pain team will not see. Should she not discharge and if she needs seen please re-consult.     Thank you!      Yanni MON, FNP-C  [facility] Pain Management Program [facility]   Monday-Friday 8a-4p   Page via online paging system or [employer]
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
## 2023-12-16 LAB — BACTERIA UR CULT: NORMAL

## 2023-12-29 ENCOUNTER — PRE VISIT (OUTPATIENT)
Dept: NEUROSURGERY | Facility: CLINIC | Age: 73
End: 2023-12-29
Payer: COMMERCIAL

## 2023-12-29 NOTE — TELEPHONE ENCOUNTER
NEUROSURGERY- NEW PREVISIT PLANNING       Record Status/Location     Referring Provider In referrals  Davide Ramirez DO    Diagnosis In referral Lumbar radiculopathy    MRI (HEAD, NECK, SPINE) N/A No   CT In imaging  Yes (if yes, when/where) 12/14/23   X-ray In imaging  Yes (if yes, when/where) 12/14/23 pelvis    INJECTION N/A No   PHYSICAL THERAPY N/A No   SURGERY N/A No

## 2024-01-26 ENCOUNTER — APPOINTMENT (OUTPATIENT)
Dept: INTERPRETER SERVICES | Facility: CLINIC | Age: 74
End: 2024-01-26
Payer: COMMERCIAL

## 2024-01-29 ENCOUNTER — OFFICE VISIT (OUTPATIENT)
Dept: NEUROSURGERY | Facility: CLINIC | Age: 74
End: 2024-01-29
Attending: INTERNAL MEDICINE
Payer: COMMERCIAL

## 2024-01-29 VITALS
WEIGHT: 98 LBS | HEART RATE: 76 BPM | DIASTOLIC BLOOD PRESSURE: 56 MMHG | SYSTOLIC BLOOD PRESSURE: 117 MMHG | BODY MASS INDEX: 19.24 KG/M2 | HEIGHT: 60 IN | OXYGEN SATURATION: 95 %

## 2024-01-29 DIAGNOSIS — M54.16 LUMBAR RADICULOPATHY: ICD-10-CM

## 2024-01-29 PROCEDURE — 99203 OFFICE O/P NEW LOW 30 MIN: CPT

## 2024-01-29 RX ORDER — METHYLPREDNISOLONE 4 MG
TABLET, DOSE PACK ORAL
Qty: 21 TABLET | Refills: 0 | Status: SHIPPED | OUTPATIENT
Start: 2024-01-29 | End: 2024-10-04

## 2024-01-29 RX ORDER — PREDNISONE 20 MG/1
20 TABLET ORAL DAILY
COMMUNITY
End: 2024-10-04

## 2024-01-29 ASSESSMENT — PAIN SCALES - GENERAL: PAINLEVEL: WORST PAIN (10)

## 2024-01-29 NOTE — NURSING NOTE
Katja Long is a 73 year old female who presents for:  Chief Complaint   Patient presents with    Consult     Lumbar  Low right back pain  Right leg pain, to knee  Worse with activity   Numbness when walking  November 2023, onset of symptoms after travel        Initial Vitals:  /56   Pulse 76   Ht 5' (1.524 m)   Wt 98 lb (44.5 kg)   SpO2 95%   BMI 19.14 kg/m   Estimated body mass index is 19.14 kg/m  as calculated from the following:    Height as of this encounter: 5' (1.524 m).    Weight as of this encounter: 98 lb (44.5 kg).. Body surface area is 1.37 meters squared. BP completed using cuff size: regular  Worst Pain (10)      Pieter Oconnor

## 2024-01-29 NOTE — LETTER
1/29/2024         RE: Katja Long  [address] [address]        Dear Colleague,    Thank you for referring your patient, Katja Long, to the [facility]. Please see a copy of my visit note below.    [facility] Visit    HPI: Katja Long is a 73 year old female who presents for evaluation of right lower extremity pain. Interpreting services used for this visit. Symptoms started in November and have been worsening since. No known injury or precipitating event. Today, patient reports right lower extremity pain that radiates from right buttock down posterolateral aspect to the right foot as well as in right anterior shin. Pain is worsened with standing and walking. She reports she has had increasing difficulties recently with walking requiring a wheelchair today for assistance. She does have numbness along same distribution. No left sided symptoms. Denies any saddle anesthesia, or bladder/bowel incontinence.     Patient has been evaluated at urgent care on 11/10/23 and in the ER on 12/14/23 for above mentioned symptoms. Patient has tried lidocaine patches, tylenol for pain management. No previous lumbar surgeries.     Past Medical History:   Diagnosis Date     Hypertension      Kidney stone        Past Medical History reviewed with patient during visit.    Past Surgical History:   Procedure Laterality Date     EYE SURGERY       HC REMOVAL GALLBLADDER      Description: Cholecystectomy;  Recorded: 03/14/2014;     Past Surgical History reviewed with patient during visit.    Current Outpatient Medications   Medication     cholecalciferol 50 MCG (2000 UT) CAPS     famotidine (PEPCID) 40 MG tablet     gabapentin (NEURONTIN) 300 MG capsule     GOODSENSE ARTHRITIS PAIN 650 MG CR tablet     ketotifen fumarate 0.035%, ketotifen 0.025%, (ZADITOR) 0.025 % ophthalmic solution     Lidocaine (LIDOCARE) 4 % Patch     lisinopril-hydrochlorothiazide  (PRINZIDE,ZESTORETIC) 10-12.5 mg per tablet     VENTOLIN HFA 90 mcg/actuation inhaler     No current facility-administered medications for this visit.       No Known Allergies    Social History     Socioeconomic History     Marital status:    Tobacco Use     Smoking status: Never     Smokeless tobacco: Never   Substance and Sexual Activity     Alcohol use: No     Drug use: No       No family history on file.    ROS: 10 point ROS neg other than the symptoms noted above in the HPI.    Vital Signs: There were no vitals taken for this visit.    Neurological Examination:  Awake  Alert  Oriented x 3  Speech clear    Motor exam:  Iliopsoas  (hip flexion)               Right: 5/5  Left:  5/5  Quadriceps  (knee extension)       Right:  5/5  Left:  5/5  Hamstrings  (knee flexion)            Right:  4/5  Left:  5/5  Gastroc Soleus  (PF)                          Right:  5/5  Left:  5/5  Tibialis Ant  (DF)                          Right:  4/5  Left:  5/5  EHL                          Right:  5/5  Left:  5/5       *right lower extremity weakness likely pain limiting    Sensation decreased in right lower extremity    Reflexes are 2+ in the patellar and Achilles.     Musculoskeletal:  Gait: Presents in wheel chair, antalgic gait prior to obtaining wheel chair for visit.    Positive straight leg raise on right.    Imaging:   CT lumbar spine reviewed from 12/14/23  IMPRESSION:  1.  No acute lumbar spine fracture.  2.  Moderate to severe canal stenosis at L4-L5.  3.  Severe right L4-L5 and bilateral L5-S1 foraminal stenosis.    Assessment/Plan:   Katja Long is a 73 year old female who presents for evaluation of right lower extremity pain x 2 mos. Pain is in L4/L5 distribution on the right with associated numbness. She has tried tylenol and lidocaine patches for treatment. She does have CT imaging evidence of moderate to severe spinal canal stenosis at L4-5 and severe right L4-5 and bilateral L5-S1 foraminal stenosis. She does  have decreased sensation in right lower extremity as well as mild weakness in right lower extremity, likely pain limiting. We will try trial of medrol dose montrell as well as physical therapy for 4 weeks, and then have patient follow up in clinic to discuss next steps. Discussed if at any point weakness progresses or she is unable to ambulate at all, develops bowel/bladder symptoms, or pain is uncontrolled she should seek medical attention and should update our office.     Patient voiced understanding and agreement.      Monae Cormier PA-C  [facility] [place] [address]        Again, thank you for allowing me to participate in the care of your patient.        Sincerely,        MONAE CORMIER PA-C

## 2024-01-29 NOTE — PROGRESS NOTES
[facility] Clinic Visit    HPI: Katja Long is a 73 year old female who presents for evaluation of right lower extremity pain. Interpreting services used for this visit. Symptoms started in November and have been worsening since. No known injury or precipitating event. Today, patient reports right lower extremity pain that radiates from right buttock down posterolateral aspect to the right foot as well as in right anterior shin. Pain is worsened with standing and walking. She reports she has had increasing difficulties recently with walking requiring a wheelchair today for assistance. She does have numbness along same distribution. No left sided symptoms. Denies any saddle anesthesia, or bladder/bowel incontinence.     Patient has been evaluated at urgent care on 11/10/23 and in the ER on 12/14/23 for above mentioned symptoms. Patient has tried lidocaine patches, tylenol for pain management. No previous lumbar surgeries.     Past Medical History:   Diagnosis Date    Hypertension     Kidney stone        Past Medical History reviewed with patient during visit.    Past Surgical History:   Procedure Laterality Date    EYE SURGERY      HC REMOVAL GALLBLADDER      Description: Cholecystectomy;  Recorded: 03/14/2014;     Past Surgical History reviewed with patient during visit.    Current Outpatient Medications   Medication    cholecalciferol 50 MCG (2000 UT) CAPS    famotidine (PEPCID) 40 MG tablet    gabapentin (NEURONTIN) 300 MG capsule    GOODSENSE ARTHRITIS PAIN 650 MG CR tablet    ketotifen fumarate 0.035%, ketotifen 0.025%, (ZADITOR) 0.025 % ophthalmic solution    Lidocaine (LIDOCARE) 4 % Patch    lisinopril-hydrochlorothiazide (PRINZIDE,ZESTORETIC) 10-12.5 mg per tablet    VENTOLIN HFA 90 mcg/actuation inhaler     No current facility-administered medications for this visit.       No Known Allergies    Social History     Socioeconomic History    Marital status:    Tobacco Use    Smoking  status: Never    Smokeless tobacco: Never   Substance and Sexual Activity    Alcohol use: No    Drug use: No       No family history on file.    ROS: 10 point ROS neg other than the symptoms noted above in the HPI.    Vital Signs: There were no vitals taken for this visit.    Neurological Examination:  Awake  Alert  Oriented x 3  Speech clear    Motor exam:  Iliopsoas  (hip flexion)               Right: 5/5  Left:  5/5  Quadriceps  (knee extension)       Right:  5/5  Left:  5/5  Hamstrings  (knee flexion)            Right:  4/5  Left:  5/5  Gastroc Soleus  (PF)                          Right:  5/5  Left:  5/5  Tibialis Ant  (DF)                          Right:  4/5  Left:  5/5  EHL                          Right:  5/5  Left:  5/5       *right lower extremity weakness likely pain limiting    Sensation decreased in right lower extremity    Reflexes are 2+ in the patellar and Achilles.     Musculoskeletal:  Gait: Presents in wheel chair, antalgic gait prior to obtaining wheel chair for visit.    Positive straight leg raise on right.    Imaging:   CT lumbar spine reviewed from 12/14/23  IMPRESSION:  1.  No acute lumbar spine fracture.  2.  Moderate to severe canal stenosis at L4-L5.  3.  Severe right L4-L5 and bilateral L5-S1 foraminal stenosis.    Assessment/Plan:   Katja Long is a 73 year old female who presents for evaluation of right lower extremity pain x 2 mos. Pain is in L4/L5 distribution on the right with associated numbness. She has tried tylenol and lidocaine patches for treatment. She does have CT imaging evidence of moderate to severe spinal canal stenosis at L4-5 and severe right L4-5 and bilateral L5-S1 foraminal stenosis. She does have decreased sensation in right lower extremity as well as mild weakness in right lower extremity, likely pain limiting. We will try trial of medrol dose montrell as well as physical therapy for 4 weeks, and then have patient follow up in clinic to discuss next steps. Discussed if  at any point weakness progresses or she is unable to ambulate at all, develops bowel/bladder symptoms, or pain is uncontrolled she should seek medical attention and should update our office.     Patient voiced understanding and agreement.      Monae Cormier PA-C  [facility]surgery  [address] [address]

## 2024-02-01 ENCOUNTER — THERAPY VISIT (OUTPATIENT)
Dept: PHYSICAL THERAPY | Facility: REHABILITATION | Age: 74
End: 2024-02-01
Payer: COMMERCIAL

## 2024-02-01 DIAGNOSIS — M54.16 LUMBAR RADICULOPATHY: ICD-10-CM

## 2024-02-01 PROCEDURE — 97110 THERAPEUTIC EXERCISES: CPT | Mod: GP

## 2024-02-01 PROCEDURE — 97140 MANUAL THERAPY 1/> REGIONS: CPT | Mod: GP

## 2024-02-01 PROCEDURE — 97161 PT EVAL LOW COMPLEX 20 MIN: CPT | Mod: GP

## 2024-02-01 NOTE — PROGRESS NOTES
PHYSICAL THERAPY EVALUATION  Type of Visit: Evaluation    See electronic medical record for Abuse and Falls Screening details.    Subjective       Presenting condition or subjective complaint: Pt presents to PT for R LE and lumbar pain. This started in November 2023 with insidious onset. She currently endorses pain in hr R buttock that travels down the posterior lateral aspect of the thigh to her R foot and R anterior shin. Her pain is worse with standing and walking. Her pain is better with a pain relief patch, medication,ice packs and sitting gives her slight relief. She rates her worst pain 10/10 with walking and 8/10 with sitting. She has tried tylenol and lidocaine patches to help with symptoms. CT images reveal L4-5 moderate to severe spinal canal stenosis, and right sided and bilatral L5-S1 severe foraminal stenosis. She has started her medrol dose montrell and this has been helpful with pain relief. Per chart, the [facility] would like her to be seen for 4 weeks and then follow up with clinic.  Date of onset: 11/01/23    Relevant medical history: High blood pressure   Dates & types of surgery:      Prior diagnostic imaging/testing results: X-ray; CT scan     Prior therapy history for the same diagnosis, illness or injury: No      Prior Level of Function  Transfers: Independent  Ambulation: Independent  ADL: Independent, Assistive equipment and person  IADL:     Living Environment  Social support: With family members   Type of home: House; 1 level   Stairs to enter the home: Yes 2 Is there a railing: No   Ramp: No   Stairs inside the home: Yes 10 Is there a railing: Yes   Help at home: Self Cares (home health aide/personal care attendant, family, etc); Home management tasks (cooking, cleaning); Home and Yard maintenance tasks; Assist for driving and community activities  Equipment owned: Crutches; Power wheelchair or scooter     Employment: No    Hobbies/Interests: gardening    Patient goals for therapy: To walk further  distance    Pain assessment:      Objective     LUMBAR SPINE EVALUATION  PAIN: Pain is Exacerbated By: walking and standing  INTEGUMENTARY (edema, incisions):   POSTURE: Sitting Posture: Forward head, Lordosis increased  GAIT:   Weightbearing Status:   Assistive Device(s):   Gait Deviations: Antalgic  Tania decreased    ROM:  Pt has difficulty moving, but ROM is significantly decreased B.    STRENGTH:   Pain: - none + mild ++ moderate +++ severe  Strength Scale: 0-5/5 Left Right   Hip Flexion 5- 4+   Hip Extension 5- 4+   Hip Abduction 5 5   Hip Adduction 5 5   Hip Internal Rotation 5- 5-   Hip External Rotation 5- 5-   Knee Flexion 5- 4+   Knee Extension 5- 4+       DERMATOMES:    Left Right   T12      L1     L2     L3 Normal (light touch) Hypo (light touch)   L4 Normal (light touch) Hypo (light touch)   L5 Normal (light touch) Hypo (light touch)   S1         LUMBAR/HIP Special Tests:    Left Right   FABIENNE Negative  Negative    FADIR/Labrum/CHHAYA Negative  Negative    Femoral Nerve     Pait's     Piriformis     Quadrant Testing     SLR Negative  Positive   Slump     Stork with Extension     Francisco             PELVIS/SI SPECIAL TESTS:   FUNCTIONAL TESTS:   PALPATION:   + Tenderness At Location Left Right   Quadratus Lumborum     Erector Spinae  +   Piriformis   +   PSIS  +   ASIS     Iliac Crest     Glut Medius  +   Greater Trochanter  +   Ischial Tuberosity  +   Hamstrings     Hip Flexors     Vertebral            Assessment & Plan   CLINICAL IMPRESSIONS  Medical Diagnosis: Lumbar radiculopathy    Treatment Diagnosis: R sided lumbar and hip pain, weakness, gait impairments, poor posture   Impression/Assessment: Patient is a 73 year old female with Right lumbar pain complaints.  The following significant findings have been identified: Pain, Decreased ROM/flexibility, Decreased joint mobility, Decreased strength, Impaired sensation, Impaired gait, Impaired muscle performance, Decreased activity tolerance, and Impaired  posture. These impairments interfere with their ability to perform self care tasks, work tasks, recreational activities, household chores, household mobility, and community mobility as compared to previous level of function. Skilled PT services  required for increased ROM  and flexibility, strength training, gait training and pain mitigation techniques in order  to return to PLOF.      Clinical Decision Making (Complexity):  Clinical Presentation: Stable/Uncomplicated  Clinical Presentation Rationale: based on medical and personal factors listed in PT evaluation  Clinical Decision Making (Complexity): Low complexity    PLAN OF CARE  Treatment Interventions:  Modalities: Dry Needling, E-stim  Interventions: Gait Training, Manual Therapy, Neuromuscular Re-education, Therapeutic Activity, Therapeutic Exercise, Self-Care/Home Management    Long Term Goals     PT Goal 1  Goal Identifier: HEP  Goal Description: Pt will demonstrate understanding and independece of HEP in 30 days.  Rationale: to maximize safety and independence with performance of ADLs and functional tasks  Goal Progress: initial  Target Date: 05/01/24  PT Goal 2  Goal Identifier: KALEB  Goal Description: Pt will improve KALEB by 10% by the end of therapy in order to demonstrate overall improved function  Goal Progress: initial  Target Date: 05/01/24  PT Goal 3  Goal Identifier: walking  Goal Description: Pt will be able to  walk at least 10 minutes without experiencing an increase in symptoms by the end of therapy in order to return to PLOF.  Goal Progress: initial  Target Date: 05/01/24      Frequency of Treatment: 1 x / week  Duration of Treatment: 90 days    Recommended Referrals to Other Professionals:   Education Assessment:   Learner/Method: Patient;Demonstration;Family    Risks and benefits of evaluation/treatment have been explained.   Patient/Family/caregiver agrees with Plan of Care.     Evaluation Time:     PT Eval, Low Complexity Minutes (63438):  34       Signing Clinician: Darlene Whyte PT      [facility]                                                                                   OUTPATIENT PHYSICAL THERAPY      PLAN OF TREATMENT FOR OUTPATIENT REHABILITATION   Patient's Last Name, First Name, Katja Pacheco    YOB: 1950   Provider's Name   [facility]   Medical Record No.  [id number]     Onset Date: 11/01/23  Start of Care Date: 02/01/24     Medical Diagnosis:  Lumbar radiculopathy      PT Treatment Diagnosis:  R sided lumbar and hip pain, weakness, gait impairments, poor posture Plan of Treatment  Frequency/Duration: 1 x / week/ 90 days    Certification date from 02/01/24 to 05/01/24         See note for plan of treatment details and functional goals     Darlene Whyte PT                         I CERTIFY THE NEED FOR THESE SERVICES FURNISHED UNDER        THIS PLAN OF TREATMENT AND WHILE UNDER MY CARE .             Physician Signature               Date    X_____________________________________________________                  Referring Provider:  Monae Cormier    Initial Assessment  See Epic Evaluation- Start of Care Date: 02/01/24

## 2024-02-14 ENCOUNTER — THERAPY VISIT (OUTPATIENT)
Dept: PHYSICAL THERAPY | Facility: REHABILITATION | Age: 74
End: 2024-02-14
Payer: COMMERCIAL

## 2024-02-14 DIAGNOSIS — M54.16 LUMBAR RADICULOPATHY: Primary | ICD-10-CM

## 2024-02-14 PROCEDURE — 97110 THERAPEUTIC EXERCISES: CPT | Mod: GP | Performed by: PHYSICAL THERAPIST

## 2024-03-01 ENCOUNTER — THERAPY VISIT (OUTPATIENT)
Dept: PHYSICAL THERAPY | Facility: REHABILITATION | Age: 74
End: 2024-03-01
Payer: COMMERCIAL

## 2024-03-01 DIAGNOSIS — M54.16 LUMBAR RADICULOPATHY: Primary | ICD-10-CM

## 2024-03-01 PROCEDURE — 97140 MANUAL THERAPY 1/> REGIONS: CPT | Mod: GP

## 2024-03-01 PROCEDURE — 97110 THERAPEUTIC EXERCISES: CPT | Mod: GP

## 2024-03-04 ENCOUNTER — OFFICE VISIT (OUTPATIENT)
Dept: NEUROSURGERY | Facility: CLINIC | Age: 74
End: 2024-03-04
Payer: COMMERCIAL

## 2024-03-04 VITALS — OXYGEN SATURATION: 95 % | DIASTOLIC BLOOD PRESSURE: 63 MMHG | HEART RATE: 68 BPM | SYSTOLIC BLOOD PRESSURE: 119 MMHG

## 2024-03-04 DIAGNOSIS — M54.16 LUMBAR RADICULOPATHY: Primary | ICD-10-CM

## 2024-03-04 PROCEDURE — 99213 OFFICE O/P EST LOW 20 MIN: CPT

## 2024-03-04 ASSESSMENT — PAIN SCALES - GENERAL: PAINLEVEL: SEVERE PAIN (6)

## 2024-03-04 ASSESSMENT — PATIENT HEALTH QUESTIONNAIRE - PHQ9: SUM OF ALL RESPONSES TO PHQ QUESTIONS 1-9: 22

## 2024-03-04 NOTE — LETTER
3/4/2024         RE: Katja Long  [address] [facility] [address]        Dear Colleague,    Thank you for referring your patient, Katja Long, to the [facility]. Please see a copy of my visit note below.    [facility] Clinic Visit      HPI: Katja Long is a 73 year old female who presents for reevaluation of right lower extremity pain.   services used for this visit.  Patient was last seen by myself in clinic on 1/29/2024 for right lower extremity pain that started in November and has been worsening since.  Pain at that time was in a L4 and L5 distribution.  Recommended physical therapy and Medrol Dosepak for treatment of symptoms and then follow-up after completion.  She has been unable to get MRI scan due to metal in her colon.       Patient reports slight improvement in lower extremity symptoms with physical therapy and Medrol Dosepak.  However she still reports pain that is described as a burning and cramping in her right calf. She did present today just using a cane which previously she presented in a wheelchair.  She is able to walk for a little bit longer, however still gets more pain if she has to walk any sort of distance or stand for any long periods of time.  She denies any significant increase in weakness in her right lower extremity, however does feel like it is more difficult to lift when she is going up and on stairs.  No bowel or bladder symptoms.      Past Medical History:   Diagnosis Date     Hypertension      Kidney stone        Past Medical History reviewed with patient during visit.    Past Surgical History:   Procedure Laterality Date     EYE SURGERY       HC REMOVAL GALLBLADDER      Description: Cholecystectomy;  Recorded: 03/14/2014;     Past Surgical History reviewed with patient during visit.    Current Outpatient Medications   Medication     cholecalciferol 50 MCG (2000 UT) CAPS     famotidine (PEPCID) 40 MG tablet      gabapentin (NEURONTIN) 300 MG capsule     [facility] ARTHRITIS PAIN 650 MG CR tablet     ketotifen fumarate 0.035%, ketotifen 0.025%, (ZADITOR) 0.025 % ophthalmic solution     Lidocaine (LIDOCARE) 4 % Patch     lisinopril-hydrochlorothiazide (PRINZIDE,ZESTORETIC) 10-12.5 mg per tablet     methylPREDNISolone (MEDROL DOSEPAK) 4 MG tablet therapy pack     predniSONE (DELTASONE) 20 MG tablet     VENTOLIN HFA 90 mcg/actuation inhaler     No current facility-administered medications for this visit.       No Known Allergies    Social History     Socioeconomic History     Marital status:    Tobacco Use     Smoking status: Never     Smokeless tobacco: Never   Substance and Sexual Activity     Alcohol use: No     Drug use: No       No family history on file.    ROS: 10 point ROS neg other than the symptoms noted above in the HPI.    Vital Signs: There were no vitals taken for this visit.    Neurological Examination:  Awake  Alert  Oriented x 3  Speech clear    Iliopsoas  (hip flexion)               Right: 5/5  Left:  5/5  Quadriceps  (knee extension)       Right:  5/5  Left:  5/5  Hamstrings  (knee flexion)            Right:  5/5  Left:  5/5  Gastroc Soleus  (PF)                          Right:  5/5  Left:  5/5  Tibialis Ant  (DF)                          Right:  5/5  Left:  5/5  EHL                          Right:  5/5  Left:  5/5         Sensation normal to light touch    Reflexes are 2+ in the patellar and Achilles. There is no clonus.     Musculoskeletal:  Gait: Able to stand from a seated position. Normal non-antalgic, non-myelopathic gait. Able to heel/toe walk without loss of balance.   Positive straight leg raise on the right  Imaging:   CT lumbar spine reviewed from 12/14/23  IMPRESSION:  1.  No acute lumbar spine fracture.  2.  Moderate to severe canal stenosis at L4-L5.  3.  Severe right L4-L5 and bilateral L5-S1 foraminal stenosis.    Assessment/Plan:   Katja Long is a 73 year old female who presents for  reevaluation of right lower extremity pain.   services used for this visit.  Patient was last seen by myself in clinic on 1/29/2024 for right lower extremity pain that started in November and has been worsening since.  She has now tried physical therapy x 4 weeks, and medications without significant relief.  I will thus send her for a lumbar DANIEL.  She should continue with physical therapy exercises.  If DANIEL is beneficial, then she can continue with  conservative measures. If not beneficial, then I would recommend she come in and meet with Dr. Concepcion to discuss further surgical options.  I did order a lumbar MRI scan as she has not had one before due to metal in her colon but she does tell me that she was told that the metal in her colon should pass and then be able to get the MRI scan.  Advised that family related to MRI tech prior to undergoing MRI scan.    She also scored high on PHQ 9 today, and reports she has had to deal with the loss of her  and stepson recently.  I have placed a referral for therapy for this.    Advised patient to call our clinic with any questions or concerns. Discussed red flag symptoms and advised to seek medical attention if these develop. Patient voiced understanding and agreement.      Monae Cormier PA-C  [facility]  [address] [address]        Again, thank you for allowing me to participate in the care of your patient.        Sincerely,        MONAE CORMIER PA-C

## 2024-03-04 NOTE — PROGRESS NOTES
[facility] Neurosurgery Clinic Visit      HPI: Katja Long is a 73 year old female who presents for reevaluation of right lower extremity pain.   services used for this visit.  Patient was last seen by myself in clinic on 1/29/2024 for right lower extremity pain that started in November and has been worsening since.  Pain at that time was in a L4 and L5 distribution.  Recommended physical therapy and Medrol Dosepak for treatment of symptoms and then follow-up after completion.  She has been unable to get MRI scan due to metal in her colon.       Patient reports slight improvement in lower extremity symptoms with physical therapy and Medrol Dosepak.  However she still reports pain that is described as a burning and cramping in her right calf. She did present today just using a cane which previously she presented in a wheelchair.  She is able to walk for a little bit longer, however still gets more pain if she has to walk any sort of distance or stand for any long periods of time.  She denies any significant increase in weakness in her right lower extremity, however does feel like it is more difficult to lift when she is going up and on stairs.  No bowel or bladder symptoms.      Past Medical History:   Diagnosis Date    Hypertension     Kidney stone        Past Medical History reviewed with patient during visit.    Past Surgical History:   Procedure Laterality Date    EYE SURGERY      HC REMOVAL GALLBLADDER      Description: Cholecystectomy;  Recorded: 03/14/2014;     Past Surgical History reviewed with patient during visit.    Current Outpatient Medications   Medication    cholecalciferol 50 MCG (2000 UT) CAPS    famotidine (PEPCID) 40 MG tablet    gabapentin (NEURONTIN) 300 MG capsule    [facility] ARTHRITIS PAIN 650 MG CR tablet    ketotifen fumarate 0.035%, ketotifen 0.025%, (ZADITOR) 0.025 % ophthalmic solution    Lidocaine (LIDOCARE) 4 % Patch    lisinopril-hydrochlorothiazide  (PRINZIDE,ZESTORETIC) 10-12.5 mg per tablet    methylPREDNISolone (MEDROL DOSEPAK) 4 MG tablet therapy pack    predniSONE (DELTASONE) 20 MG tablet    VENTOLIN HFA 90 mcg/actuation inhaler     No current facility-administered medications for this visit.       No Known Allergies    Social History     Socioeconomic History    Marital status:    Tobacco Use    Smoking status: Never    Smokeless tobacco: Never   Substance and Sexual Activity    Alcohol use: No    Drug use: No       No family history on file.    ROS: 10 point ROS neg other than the symptoms noted above in the HPI.    Vital Signs: There were no vitals taken for this visit.    Neurological Examination:  Awake  Alert  Oriented x 3  Speech clear    Iliopsoas  (hip flexion)               Right: 5/5  Left:  5/5  Quadriceps  (knee extension)       Right:  5/5  Left:  5/5  Hamstrings  (knee flexion)            Right:  5/5  Left:  5/5  Gastroc Soleus  (PF)                          Right:  5/5  Left:  5/5  Tibialis Ant  (DF)                          Right:  5/5  Left:  5/5  EHL                          Right:  5/5  Left:  5/5         Sensation normal to light touch    Reflexes are 2+ in the patellar and Achilles. There is no clonus.     Musculoskeletal:  Gait: Able to stand from a seated position. Normal non-antalgic, non-myelopathic gait. Able to heel/toe walk without loss of balance.   Positive straight leg raise on the right  Imaging:   CT lumbar spine reviewed from 12/14/23  IMPRESSION:  1.  No acute lumbar spine fracture.  2.  Moderate to severe canal stenosis at L4-L5.  3.  Severe right L4-L5 and bilateral L5-S1 foraminal stenosis.    Assessment/Plan:   Katja Long is a 73 year old female who presents for reevaluation of right lower extremity pain.   services used for this visit.  Patient was last seen by myself in clinic on 1/29/2024 for right lower extremity pain that started in November and has been worsening since.  She has now tried  physical therapy x 4 weeks, and medications without significant relief.  I will thus send her for a lumbar DANIEL.  She should continue with physical therapy exercises.  If DANIEL is beneficial, then she can continue with  conservative measures. If not beneficial, then I would recommend she come in and meet with Dr. Concepcion to discuss further surgical options.  I did order a lumbar MRI scan as she has not had one before due to metal in her colon but she does tell me that she was told that the metal in her colon should pass and then be able to get the MRI scan.  Advised that family related to MRI tech prior to undergoing MRI scan.    She also scored high on PHQ 9 today, and reports she has had to deal with the loss of her  and stepson recently.  I have placed a referral for therapy for this.    Advised patient to call our clinic with any questions or concerns. Discussed red flag symptoms and advised to seek medical attention if these develop. Patient voiced understanding and agreement.      Monae Cormier PA-C  [facility]  [address] [address]

## 2024-03-07 ENCOUNTER — THERAPY VISIT (OUTPATIENT)
Dept: PHYSICAL THERAPY | Facility: REHABILITATION | Age: 74
End: 2024-03-07
Payer: COMMERCIAL

## 2024-03-07 DIAGNOSIS — M54.16 LUMBAR RADICULOPATHY: Primary | ICD-10-CM

## 2024-03-07 PROCEDURE — 97110 THERAPEUTIC EXERCISES: CPT | Mod: GP

## 2024-03-07 PROCEDURE — 97140 MANUAL THERAPY 1/> REGIONS: CPT | Mod: GP

## 2024-03-12 ENCOUNTER — HOSPITAL ENCOUNTER (OUTPATIENT)
Dept: MRI IMAGING | Facility: CLINIC | Age: 74
Discharge: HOME | End: 2024-03-12
Payer: COMMERCIAL

## 2024-03-12 DIAGNOSIS — M54.16 LUMBAR RADICULOPATHY: ICD-10-CM

## 2024-03-12 PROCEDURE — 72148 MRI LUMBAR SPINE W/O DYE: CPT

## 2024-03-14 ENCOUNTER — THERAPY VISIT (OUTPATIENT)
Dept: PHYSICAL THERAPY | Facility: REHABILITATION | Age: 74
End: 2024-03-14
Payer: COMMERCIAL

## 2024-03-14 DIAGNOSIS — M54.16 LUMBAR RADICULOPATHY: Primary | ICD-10-CM

## 2024-03-14 PROCEDURE — 97140 MANUAL THERAPY 1/> REGIONS: CPT | Mod: GP

## 2024-03-14 PROCEDURE — 97110 THERAPEUTIC EXERCISES: CPT | Mod: GP

## 2024-03-18 ENCOUNTER — RADIOLOGY INJECTION OFFICE VISIT (OUTPATIENT)
Dept: PHYSICAL MEDICINE AND REHAB | Facility: CLINIC | Age: 74
End: 2024-03-18
Payer: COMMERCIAL

## 2024-03-18 VITALS
HEART RATE: 75 BPM | RESPIRATION RATE: 16 BRPM | TEMPERATURE: 97.7 F | DIASTOLIC BLOOD PRESSURE: 58 MMHG | SYSTOLIC BLOOD PRESSURE: 112 MMHG | OXYGEN SATURATION: 97 %

## 2024-03-18 DIAGNOSIS — M54.16 LUMBAR RADICULOPATHY: ICD-10-CM

## 2024-03-18 PROCEDURE — 64483 NJX AA&/STRD TFRM EPI L/S 1: CPT | Mod: RT | Performed by: PAIN MEDICINE

## 2024-03-18 PROCEDURE — 64484 NJX AA&/STRD TFRM EPI L/S EA: CPT | Mod: RT | Performed by: PAIN MEDICINE

## 2024-03-18 RX ORDER — DEXAMETHASONE SODIUM PHOSPHATE 10 MG/ML
INJECTION, SOLUTION INTRAMUSCULAR; INTRAVENOUS
Status: COMPLETED | OUTPATIENT
Start: 2024-03-18 | End: 2024-03-18

## 2024-03-18 RX ORDER — LIDOCAINE HYDROCHLORIDE 10 MG/ML
INJECTION, SOLUTION EPIDURAL; INFILTRATION; INTRACAUDAL; PERINEURAL
Status: COMPLETED | OUTPATIENT
Start: 2024-03-18 | End: 2024-03-18

## 2024-03-18 RX ADMIN — LIDOCAINE HYDROCHLORIDE 4 ML: 10 INJECTION, SOLUTION EPIDURAL; INFILTRATION; INTRACAUDAL; PERINEURAL at 15:09

## 2024-03-18 RX ADMIN — DEXAMETHASONE SODIUM PHOSPHATE 10 MG: 10 INJECTION, SOLUTION INTRAMUSCULAR; INTRAVENOUS at 15:09

## 2024-03-18 ASSESSMENT — PAIN SCALES - GENERAL
PAINLEVEL: NO PAIN (0)
PAINLEVEL: MODERATE PAIN (5)

## 2024-03-18 NOTE — PATIENT INSTRUCTIONS
Follow-up visit with [facility] in 2 weeks to discuss injection outcome and determine care plan going forward.       DISCHARGE INSTRUCTIONS    During office hours (8:00 a.m.- 4:00 p.m.) questions or concerns may be answered  by calling [facility] Navigation Nurses at  [phone].  Messages received after hours will be returned the following business day.      In the case of an emergency, please dial 911 or seek assistance at the nearest Emergency Room/Urgent Care facility.     All Patients:    You may experience an increase in your symptoms for the first 2 days (It may take anywhere between 2 days- 2 weeks for the steroid to have maximum effect).    You may use ice on the injection site, as frequently as 20 minutes each hour if needed.    You may take your pain medicine.    You may continue taking your regular medication after your injection. If you have had a Medial Branch Block you may resume pain medication once your pain diary is completed.    You may shower. No swimming, tub bath or hot tub for 48 hours.  You may remove your bandaid/bandage as soon as you are home.    You may resume light activities, as tolerated.    Resume your usual diet as tolerated.    It is strongly advised that you do not drive for 1-3 hours post injection.    If you have had oral sedation:  Do not drive for 8 hours post injection.      If you have had IV sedation:  Do not drive for 24 hours post injection.  Do not operate hazardous machinery or make important personal/business decisions for 24 hours.      POSSIBLE STEROID SIDE EFFECTS (If steroid/cortisone was used for your procedure)    -If you experience these symptoms, it should only last for a short period    Swelling of the legs              Skin redness (flushing)     Mouth (oral) irritation   Blood sugar (glucose) levels            Sweats                    Mood changes  Headache  Sleeplessness  Weakened immune system for up to 14 days, which could increase  the risk of bang the COVID-19 virus and/or experiencing more severe symptoms of the disease, if exposed.  Decreased effectiveness of the flu vaccine if given within 2 weeks of the steroid.         POSSIBLE PROCEDURE SIDE EFFECTS  -Call the Spine Center if you are concerned  Increased Pain           Increased numbness/tingling      Nausea/Vomiting          Bruising/bleeding at site      Redness or swelling                                              Difficulty walking      Weakness           Fever greater than 100.5    *In the event of a severe headache after an epidural steroid injection that is relieved by lying down, please call the [facility] to speak with a clinical staff member*

## 2024-03-25 ENCOUNTER — TELEPHONE (OUTPATIENT)
Dept: NEUROSURGERY | Facility: CLINIC | Age: 74
End: 2024-03-25
Payer: COMMERCIAL

## 2024-03-25 NOTE — TELEPHONE ENCOUNTER
Spoke to patient about appointment scheduled with Monae on 03/28/24 at 2:30pm. Discussed patient coming in at 2:00pm to ensure we have ample time in case we have issues with language barrier. Patient had daughter interpret and was in agreement with plan. Patient will plan to arrive prior to 2:00pm.

## 2024-04-16 ENCOUNTER — THERAPY VISIT (OUTPATIENT)
Dept: PHYSICAL THERAPY | Facility: REHABILITATION | Age: 74
End: 2024-04-16
Payer: COMMERCIAL

## 2024-04-16 DIAGNOSIS — M54.16 LUMBAR RADICULOPATHY: Primary | ICD-10-CM

## 2024-04-16 PROCEDURE — 97110 THERAPEUTIC EXERCISES: CPT | Mod: GP

## 2024-04-16 NOTE — PROGRESS NOTES
DISCHARGE  Reason for Discharge: Patient has met all goals.    Equipment Issued: NA      Discharge Plan: Patient to continue home program.    Referring Provider:  Monae Cormier        04/16/24 0500   Appointment Info   Signing clinician's name / credentials Darlene Whyte PT   Total/Authorized Visits 12   Visits Used 6   Medical Diagnosis Lumbar radiculopathy   PT Tx Diagnosis R sided lumbar and hip pain, weakness, gait impairments, poor posture   Quick Adds Certification   Progress Note/Certification   Start of Care Date 02/01/24   Onset of illness/injury or Date of Surgery 11/01/23   Therapy Frequency 1 x / week   Predicted Duration 90 days   Certification date from 02/01/24   Certification date to 05/01/24   Progress Note Due Date 04/04/24       Present Yes    Language Hmong    ID or First/Last Name [id number]   GOALS   PT Goals 2;3   PT Goal 1   Goal Identifier HEP   Goal Description Pt will demonstrate understanding and independece of HEP in 30 days.   Rationale to maximize safety and independence with performance of ADLs and functional tasks   Goal Progress MET   Target Date 05/01/24   Date Met 04/16/24   PT Goal 2   Goal Identifier KALEB   Goal Description Pt will improve KALEB by 10% by the end of therapy in order to demonstrate overall improved function   Goal Progress MET,  2.22% on 4/16/24   Target Date 05/01/24   Date Met 04/16/24   PT Goal 3   Goal Identifier walking   Goal Description Pt will be able to  walk at least 10 minutes without experiencing an increase in symptoms by the end of therapy in order to return to PLOF.   Goal Progress MET, pt able to walk without pain   Target Date 05/01/24   Date Met 04/16/24   Subjective Report   Subjective Report Pt reports that after her injection she feels much better. She feels like she has a good exercise program at  home.   Objective Measures   Objective Measures Objective Measure 1;Objective Measure 3;Objective  "Measure 2   Objective Measure 1   Objective Measure KALEB   Details 64.44% on 2/1/24, 2.22% on 4/16/24   Objective Measure 2   Objective Measure L LE strength   Details 5/5 in  all motions on 4/16/24   Objective Measure 3   Objective Measure R LE strength   Details 5/5 in all motions except: hip flex  - 5-/5 and knee flex 5-/5 on 4/16/24   Treatment Interventions (PT)   Interventions Therapeutic Procedure/Exercise;Manual Therapy   Therapeutic Procedure/Exercise   Therapeutic Procedures: strength, endurance, ROM, flexibility minutes (95186) 32   Therapeutic Procedures Ther Proc 2;Ther Proc 3;Ther Proc 4;Ther Proc 5;Ther Proc 6;Ther Proc 7;Ther Proc 8;Ther Proc 9   Ther Proc 1 NuStep:x 5 min   Ther Proc 1 - Details Supine LTRs: x10 as review   Ther Proc 2 NT: Supine piriformis stretch below 90: increased pain   Ther Proc 2 - Details NT: Supine piriformis stretch above 90: L 2 x1', tolerated better today   Ther Proc 3 NT: Supine sciatic nerve glide: L x15 total with therapist assist, heavy cueing for form for re-instruction for HEP, spent long time on this exercise today for education on form   Ther Proc 3 - Details NT: Bridge: x 8   Ther Proc 4 NT: Seated hamstring stretch: L x2' with heavy instruction on form   Ther Proc 4 - Details NT: Prone on elbows: trialed x1', incr gluteal pain   Ther Proc 5 supine marching: x 12  reps   Ther Proc 5 - Details STS: x 10 reps, NT   Ther Proc 6 4\" step  ups:  x 10 reps, NT   Ther Proc 6 - Details Heel  and toe raises: x 15 reps   Ther Proc 7 rows: x12 reps, yellow band, gave pt red band  as yellow was too easy, reviewed   Ther Proc 7 - Details clamshells: x 15  reps B, cues for proper form and lifting correct leg, reviewed   Ther Proc 8 NT: Hip ABD  SLR: x 10 reps B   Ther Proc 8 - Details NT: Seated hip ABD/ADD w/ ball and green  TB: x 10 reps B   PTRx Ther Proc 1 Supine Lumbar Hip Roll   PTRx Ther Proc 1 - Details Piriformis Stretch Below 90 Degrees Supine   PTRx Ther Proc 2 " Supine Sciatic Nerve Sliders   Skilled Intervention see obj measures, reviewed new ex from last session,  added new  ex,  demonstration & cueing provided   Patient Response/Progress pt has  made great progress in her exercises, she demonstrates proper form and requires minimal to no cueing for execution. She does not have any pain with ex anymore.   Ther Proc 9 Standing marching: x 12 reps B   Manual Therapy   Manual Therapy 1 MFR, STM to pts R glutes and piriformis in L sidelying   Skilled Intervention MT for pain relief and release of taught bands   Patient Response/Progress pt continues to report feeling  relief of  symptoms at the  end of  therapy   Education   Learner/Method Patient;Demonstration;Family   Education Comments Language is a barrier   Plan   Home program PTRx   Plan for next session pt will be d/c after this session   Comments   Comments Katja presents for her fifth follow-up session regarding right LE and lumbar pain. Her symptoms have improved since last visit and she has been very compliant with exercises. Katja has made great progress since starting therapy,  meeting all of her goals, increasing her B LE strength, self reporting no pain with activity and no longer using any AD when walking into therapy (see obj  measures). Because she has made significant progress in therapy, she would like to d/c at this time as she feels she has met all of her goals. PT is in agreement with this and will d/c pt.   Total Session Time   Timed Code Treatment Minutes 32   Total Treatment Time (sum of timed and untimed services) 32

## 2024-10-04 ENCOUNTER — APPOINTMENT (OUTPATIENT)
Dept: CT IMAGING | Facility: CLINIC | Age: 74
End: 2024-10-04
Attending: EMERGENCY MEDICINE
Payer: COMMERCIAL

## 2024-10-04 ENCOUNTER — APPOINTMENT (OUTPATIENT)
Dept: RADIOLOGY | Facility: CLINIC | Age: 74
End: 2024-10-04
Payer: COMMERCIAL

## 2024-10-04 ENCOUNTER — HOSPITAL ENCOUNTER (OUTPATIENT)
Facility: CLINIC | Age: 74
Setting detail: OBSERVATION
Discharge: HOME | End: 2024-10-04
Attending: EMERGENCY MEDICINE | Admitting: HOSPITALIST
Payer: COMMERCIAL

## 2024-10-04 VITALS
BODY MASS INDEX: 19.39 KG/M2 | HEIGHT: 60 IN | WEIGHT: 98.77 LBS | SYSTOLIC BLOOD PRESSURE: 118 MMHG | OXYGEN SATURATION: 96 % | HEART RATE: 61 BPM | TEMPERATURE: 97.5 F | DIASTOLIC BLOOD PRESSURE: 56 MMHG | RESPIRATION RATE: 20 BRPM

## 2024-10-04 DIAGNOSIS — T50.901A OVERDOSE, ACCIDENTAL OR UNINTENTIONAL, INITIAL ENCOUNTER: ICD-10-CM

## 2024-10-04 PROBLEM — K21.9 GASTROESOPHAGEAL REFLUX DISEASE WITHOUT ESOPHAGITIS: Status: ACTIVE | Noted: 2024-10-04

## 2024-10-04 LAB
ANION GAP SERPL CALCULATED.3IONS-SCNC: 11 MMOL/L (ref 7–15)
BASOPHILS # BLD AUTO: 0 10E3/UL (ref 0–0.2)
BASOPHILS NFR BLD AUTO: 0 %
BUN SERPL-MCNC: 19.3 MG/DL (ref 8–23)
CALCIUM SERPL-MCNC: 8.9 MG/DL (ref 8.8–10.4)
CHLORIDE SERPL-SCNC: 97 MMOL/L (ref 98–107)
CREAT SERPL-MCNC: 0.9 MG/DL (ref 0.51–0.95)
EGFRCR SERPLBLD CKD-EPI 2021: 68 ML/MIN/1.73M2
EOSINOPHIL # BLD AUTO: 0.1 10E3/UL (ref 0–0.7)
EOSINOPHIL NFR BLD AUTO: 1 %
ERYTHROCYTE [DISTWIDTH] IN BLOOD BY AUTOMATED COUNT: 12.7 % (ref 10–15)
GLUCOSE SERPL-MCNC: 113 MG/DL (ref 70–99)
HCO3 SERPL-SCNC: 25 MMOL/L (ref 22–29)
HCT VFR BLD AUTO: 32.3 % (ref 35–47)
HGB BLD-MCNC: 10.6 G/DL (ref 11.7–15.7)
IMM GRANULOCYTES # BLD: 0.1 10E3/UL
IMM GRANULOCYTES NFR BLD: 1 %
LYMPHOCYTES # BLD AUTO: 2.7 10E3/UL (ref 0.8–5.3)
LYMPHOCYTES NFR BLD AUTO: 17 %
MAGNESIUM SERPL-MCNC: 1.2 MG/DL (ref 1.7–2.3)
MCH RBC QN AUTO: 27 PG (ref 26.5–33)
MCHC RBC AUTO-ENTMCNC: 32.8 G/DL (ref 31.5–36.5)
MCV RBC AUTO: 82 FL (ref 78–100)
MONOCYTES # BLD AUTO: 0.8 10E3/UL (ref 0–1.3)
MONOCYTES NFR BLD AUTO: 5 %
NEUTROPHILS # BLD AUTO: 12.2 10E3/UL (ref 1.6–8.3)
NEUTROPHILS NFR BLD AUTO: 77 %
NRBC # BLD AUTO: 0 10E3/UL
NRBC BLD AUTO-RTO: 0 /100
PLATELET # BLD AUTO: 260 10E3/UL (ref 150–450)
POTASSIUM SERPL-SCNC: 3.3 MMOL/L (ref 3.4–5.3)
RBC # BLD AUTO: 3.92 10E6/UL (ref 3.8–5.2)
SODIUM SERPL-SCNC: 133 MMOL/L (ref 135–145)
TROPONIN T SERPL HS-MCNC: 14 NG/L
TROPONIN T SERPL HS-MCNC: 14 NG/L
WBC # BLD AUTO: 16 10E3/UL (ref 4–11)

## 2024-10-04 PROCEDURE — 80048 BASIC METABOLIC PNL TOTAL CA: CPT | Performed by: EMERGENCY MEDICINE

## 2024-10-04 PROCEDURE — 70450 CT HEAD/BRAIN W/O DYE: CPT

## 2024-10-04 PROCEDURE — 72125 CT NECK SPINE W/O DYE: CPT

## 2024-10-04 PROCEDURE — 70486 CT MAXILLOFACIAL W/O DYE: CPT

## 2024-10-04 PROCEDURE — 258N000003 HC RX IP 258 OP 636

## 2024-10-04 PROCEDURE — 99207 PR APP CREDIT; MD BILLING SHARED VISIT: CPT | Mod: FS

## 2024-10-04 PROCEDURE — 83735 ASSAY OF MAGNESIUM: CPT

## 2024-10-04 PROCEDURE — G0378 HOSPITAL OBSERVATION PER HR: HCPCS

## 2024-10-04 PROCEDURE — 96374 THER/PROPH/DIAG INJ IV PUSH: CPT

## 2024-10-04 PROCEDURE — 71045 X-RAY EXAM CHEST 1 VIEW: CPT

## 2024-10-04 PROCEDURE — 99285 EMERGENCY DEPT VISIT HI MDM: CPT | Mod: 25

## 2024-10-04 PROCEDURE — 84484 ASSAY OF TROPONIN QUANT: CPT | Performed by: EMERGENCY MEDICINE

## 2024-10-04 PROCEDURE — 85004 AUTOMATED DIFF WBC COUNT: CPT | Performed by: EMERGENCY MEDICINE

## 2024-10-04 PROCEDURE — 93005 ELECTROCARDIOGRAM TRACING: CPT | Performed by: EMERGENCY MEDICINE

## 2024-10-04 PROCEDURE — 99223 1ST HOSP IP/OBS HIGH 75: CPT | Mod: FS | Performed by: HOSPITALIST

## 2024-10-04 PROCEDURE — 36415 COLL VENOUS BLD VENIPUNCTURE: CPT | Performed by: EMERGENCY MEDICINE

## 2024-10-04 PROCEDURE — 250N000011 HC RX IP 250 OP 636

## 2024-10-04 RX ORDER — AMOXICILLIN 250 MG
1 CAPSULE ORAL 2 TIMES DAILY PRN
Status: DISCONTINUED | OUTPATIENT
Start: 2024-10-04 | End: 2024-10-04 | Stop reason: HOSPADM

## 2024-10-04 RX ORDER — AMOXICILLIN 250 MG
2 CAPSULE ORAL 2 TIMES DAILY PRN
Status: DISCONTINUED | OUTPATIENT
Start: 2024-10-04 | End: 2024-10-04 | Stop reason: HOSPADM

## 2024-10-04 RX ORDER — MAGNESIUM SULFATE HEPTAHYDRATE 40 MG/ML
2 INJECTION, SOLUTION INTRAVENOUS ONCE
Status: COMPLETED | OUTPATIENT
Start: 2024-10-04 | End: 2024-10-04

## 2024-10-04 RX ORDER — FAMOTIDINE 20 MG/1
40 TABLET, FILM COATED ORAL
Status: DISCONTINUED | OUTPATIENT
Start: 2024-10-04 | End: 2024-10-04 | Stop reason: HOSPADM

## 2024-10-04 RX ORDER — ONDANSETRON 2 MG/ML
4 INJECTION INTRAMUSCULAR; INTRAVENOUS EVERY 6 HOURS PRN
Status: DISCONTINUED | OUTPATIENT
Start: 2024-10-04 | End: 2024-10-04 | Stop reason: HOSPADM

## 2024-10-04 RX ORDER — ACETAMINOPHEN 325 MG/1
650 TABLET ORAL EVERY 4 HOURS PRN
Status: DISCONTINUED | OUTPATIENT
Start: 2024-10-04 | End: 2024-10-04 | Stop reason: HOSPADM

## 2024-10-04 RX ORDER — LISINOPRIL AND HYDROCHLOROTHIAZIDE 10; 12.5 MG/1; MG/1
1 TABLET ORAL EVERY MORNING
Status: DISCONTINUED | OUTPATIENT
Start: 2024-10-05 | End: 2024-10-04 | Stop reason: HOSPADM

## 2024-10-04 RX ORDER — LISINOPRIL AND HYDROCHLOROTHIAZIDE 10; 12.5 MG/1; MG/1
1 TABLET ORAL EVERY EVENING
Status: DISCONTINUED | OUTPATIENT
Start: 2024-10-05 | End: 2024-10-04

## 2024-10-04 RX ORDER — ACETAMINOPHEN 650 MG/1
650 SUPPOSITORY RECTAL EVERY 4 HOURS PRN
Status: DISCONTINUED | OUTPATIENT
Start: 2024-10-04 | End: 2024-10-04 | Stop reason: HOSPADM

## 2024-10-04 RX ORDER — LISINOPRIL AND HYDROCHLOROTHIAZIDE 10; 12.5 MG/1; MG/1
1 TABLET ORAL EVERY EVENING
Status: DISCONTINUED | OUTPATIENT
Start: 2024-10-04 | End: 2024-10-04

## 2024-10-04 RX ORDER — ONDANSETRON 4 MG/1
4 TABLET, ORALLY DISINTEGRATING ORAL EVERY 6 HOURS PRN
Status: DISCONTINUED | OUTPATIENT
Start: 2024-10-04 | End: 2024-10-04 | Stop reason: HOSPADM

## 2024-10-04 RX ORDER — ALBUTEROL SULFATE 90 UG/1
1-2 INHALANT RESPIRATORY (INHALATION) EVERY 4 HOURS PRN
Status: DISCONTINUED | OUTPATIENT
Start: 2024-10-04 | End: 2024-10-04 | Stop reason: HOSPADM

## 2024-10-04 RX ORDER — GABAPENTIN 300 MG/1
300 CAPSULE ORAL DAILY PRN
Status: DISCONTINUED | OUTPATIENT
Start: 2024-10-04 | End: 2024-10-04 | Stop reason: HOSPADM

## 2024-10-04 RX ADMIN — SODIUM CHLORIDE 1000 ML: 9 INJECTION, SOLUTION INTRAVENOUS at 15:27

## 2024-10-04 RX ADMIN — MAGNESIUM SULFATE HEPTAHYDRATE 2 G: 40 INJECTION, SOLUTION INTRAVENOUS at 16:50

## 2024-10-04 ASSESSMENT — ENCOUNTER SYMPTOMS
HEADACHES: 1
FOCAL WEAKNESS: 0
SHORTNESS OF BREATH: 0
NAUSEA: 1
PHOTOPHOBIA: 0
SPEECH CHANGE: 0
FEVER: 0
LOSS OF CONSCIOUSNESS: 1
DOUBLE VISION: 0
FREQUENCY: 0
PALPITATIONS: 0
FLANK PAIN: 0
COUGH: 0
DEPRESSION: 0
SEIZURES: 0
BLURRED VISION: 0
ORTHOPNEA: 0
SENSORY CHANGE: 0
DIZZINESS: 1
WHEEZING: 0
WEAKNESS: 0
DIARRHEA: 1
TREMORS: 0
BLOOD IN STOOL: 0
TINGLING: 0
ABDOMINAL PAIN: 0
HEARTBURN: 0
CHILLS: 0
DIAPHORESIS: 0
VOMITING: 1
WEIGHT LOSS: 0

## 2024-10-04 ASSESSMENT — ACTIVITIES OF DAILY LIVING (ADL)
ADLS_ACUITY_SCORE: 46
ADLS_ACUITY_SCORE: 36
ADLS_ACUITY_SCORE: 38
ADLS_ACUITY_SCORE: 46
ADLS_ACUITY_SCORE: 46
ADLS_ACUITY_SCORE: 36

## 2024-10-04 ASSESSMENT — COLUMBIA-SUICIDE SEVERITY RATING SCALE - C-SSRS
1. IN THE PAST MONTH, HAVE YOU WISHED YOU WERE DEAD OR WISHED YOU COULD GO TO SLEEP AND NOT WAKE UP?: NO
2. HAVE YOU ACTUALLY HAD ANY THOUGHTS OF KILLING YOURSELF IN THE PAST MONTH?: NO
6. HAVE YOU EVER DONE ANYTHING, STARTED TO DO ANYTHING, OR PREPARED TO DO ANYTHING TO END YOUR LIFE?: NO

## 2024-10-04 NOTE — ED PROVIDER NOTES
EMERGENCY DEPARTMENT ENCOUNTER      NAME: Katja Long  AGE: 74 year old female  YOB: 1950  MRN: [id number]  EVALUATION DATE & TIME: 10/4/2024  9:33 AM    PCP: Vaughn Moreau    ED PROVIDER: Qamar Encarnacion D.O.      Chief Complaint   Patient presents with    Fall    Hypotension       FINAL IMPRESSION:  1. Overdose, accidental or unintentional, initial encounter        ED COURSE & MEDICAL DECISION MAKIN:43 AM I met with the patient to gather history and to perform my initial exam. I discussed the plan for care while in the Emergency Department.  9:47 AM I spoke to poison control regarding watch time. Said 4 hours.  12:04 PM Spoke to poison control.  1:32 PM Rechecked and updated patient. Patient orthostatic, so plan to watch overnight base on poison control suggestions.  1:52 PM Spoke with Dr. Duque, Hospitalist, regarding plan for admission.         Pertinent Labs & Imaging studies reviewed. (See chart for details)  74 year old female presents to the Emergency Department for evaluation of syncopal fall with head injury.  Patient reports that she ran out of her lisinopril hydrochlorothiazide, so she took her daughter's 300 mg labetalol instead.  Patient reported an hour after taking the medication she became quite dizzy.  In the emergency department, she is mildly bradycardic and while laying flat she is normotensive.  We did attempt to do orthostatics, and she does have a very significant drop with orthostatics including becomes quite symptomatic.  We discussed the patient with poison control, and we did monitor in the emergency department for several hours, however as she was still orthostatic following the prolonged observation, we decided would be prudent to observe her overnight to ensure that the patient symptoms improved before sending home.  Discussed with the hospitalist who agreed to the admission.    Medical Decision Making  Obtained supplemental history:Supplemental history obtained?:  Documented in chart and Family Member/Significant Other  Reviewed external records: External records reviewed?: Documented in chart  Care impacted by chronic illness:Hypertension and Other: GERD  Did you consider but not order tests?: Work up considered but not performed and documented in chart, if applicable  Did you interpret images independently?: Independent interpretation of ECG and images noted in documentation, when applicable.  Consultation discussion with other provider:Did you involve another provider (consultant, , pharmacy, etc.)?: I discussed the care with another health care provider, see documentation for details.  Admit.    MIPS: Not Applicable        At the conclusion of the encounter I discussed the results of all of the tests and the disposition. The questions were answered. The patient or family acknowledged understanding and was agreeable with the care plan.        HPI    Patient information was obtained from: patient, patient's daughter    Use of : Yes (In Person) - Language Nellie Long is a 74 year old female who presents via EMS for evaluation of fall.    The patient has a history of hypertension, typically taking lisinopril and hydrochlorothiazide. She had ran out this morning, and so took her daughter's labetalol 300mg at 7 AM. At 8 AM, the patient became lightheaded and then had a syncopal episode. She fell, hitting her head and developing a laceration to her face.    The patient is not on blood thinners. She has a history of sciatica and takes no other prescription medications. She is otherwise at her baseline.    The patient does not smoke or drink alcohol.    Per Chart Review: history of hypertension, GERD.      PAST MEDICAL HISTORY:  Past Medical History:   Diagnosis Date    Hypertension     Kidney stone        PAST SURGICAL HISTORY:  Past Surgical History:   Procedure Laterality Date    EYE SURGERY      HC REMOVAL GALLBLADDER      Description: Cholecystectomy;   Recorded: 03/14/2014;         CURRENT MEDICATIONS:    No current facility-administered medications for this encounter.     Current Outpatient Medications   Medication Sig Dispense Refill    cholecalciferol 50 MCG (2000 UT) CAPS Take 50 mcg by mouth every evening.      famotidine (PEPCID) 40 MG tablet Take 40 mg by mouth nightly as needed for heartburn.      gabapentin (NEURONTIN) 300 MG capsule Take 300 mg by mouth daily as needed for neuropathic pain.      GOODSENSE ARTHRITIS PAIN 650 MG CR tablet Take 650 mg by mouth every 8 hours as needed for mild pain.      lisinopril-hydrochlorothiazide (PRINZIDE,ZESTORETIC) 10-12.5 mg per tablet Take 1 tablet by mouth every evening.  4    VENTOLIN HFA 90 mcg/actuation inhaler Inhale 1-2 puffs into the lungs every 4 hours as needed for shortness of breath.  1         ALLERGIES:  No Known Allergies    FAMILY HISTORY:  History reviewed. No pertinent family history.    SOCIAL HISTORY:  Social History     Socioeconomic History    Marital status:    Tobacco Use    Smoking status: Never    Smokeless tobacco: Never   Substance and Sexual Activity    Alcohol use: No    Drug use: No       VITALS:  Patient Vitals for the past 24 hrs:   BP Temp Temp src Pulse Resp SpO2   10/04/24 1328 91/54 -- -- 59 16 96 %   10/04/24 1313 127/62 -- -- 57 18 96 %   10/04/24 1258 119/57 -- -- 58 19 97 %   10/04/24 1232 126/59 -- -- 56 22 98 %   10/04/24 1202 138/65 -- -- 59 19 97 %   10/04/24 1132 112/54 -- -- 59 20 98 %   10/04/24 1125 -- -- -- -- 18 --   10/04/24 1109 116/58 -- -- 61 -- 95 %   10/04/24 1013 -- -- -- 58 -- 96 %   10/04/24 0943 126/58 -- -- 56 20 96 %   10/04/24 0938 118/57 97.4  F (36.3  C) Oral 60 16 96 %       PHYSICAL EXAM    VITAL SIGNS: BP 91/54   Pulse 59   Temp 97.4  F (36.3  C) (Oral)   Resp 16   SpO2 96%     General Appearance: Well-appearing, well-nourished, no acute distress   Head:  Normocephalic, without obvious abnormality, atraumatic  Eyes:  PERRL,  conjunctiva/corneas clear, EOM's intact,  ENT:  Lips, mucosa, and tongue normal, membranes are moist without pallor  Neck:  Normal ROM, symmetrical, trachea midline    Cardio:  Regular rate and rhythm, no murmur, rub or gallop, 2+ pulses symmetric in all extremities  Pulm:  Clear to auscultation bilaterally, respirations unlabored,  Abdomen:  Soft, non-tender, no rebound or guarding.  Musculoskeletal: Full ROM, no edema, no cyanosis, good ROM of major joints  Integument:  Warm, Dry, No erythema, No rash.    Neurologic:  Alert & oriented.  No focal deficits appreciated.    Psychiatric:  Affect normal, Judgment normal, Mood normal.      LABS  Results for orders placed or performed during the hospital encounter of 10/04/24 (from the past 24 hour(s))   CBC with platelets + differential    Narrative    The following orders were created for panel order CBC with platelets + differential.  Procedure                               Abnormality         Status                     ---------                               -----------         ------                     CBC with platelets and d...[[id number]]  Abnormal            Final result                 Please view results for these tests on the individual orders.   Basic metabolic panel   Result Value Ref Range    Sodium 133 (L) 135 - 145 mmol/L    Potassium 3.3 (L) 3.4 - 5.3 mmol/L    Chloride 97 (L) 98 - 107 mmol/L    Carbon Dioxide (CO2) 25 22 - 29 mmol/L    Anion Gap 11 7 - 15 mmol/L    Urea Nitrogen 19.3 8.0 - 23.0 mg/dL    Creatinine 0.90 0.51 - 0.95 mg/dL    GFR Estimate 68 >60 mL/min/1.73m2    Calcium 8.9 8.8 - 10.4 mg/dL    Glucose 113 (H) 70 - 99 mg/dL   Troponin T, High Sensitivity   Result Value Ref Range    Troponin T, High Sensitivity 14 <=14 ng/L   CBC with platelets and differential   Result Value Ref Range    WBC Count 16.0 (H) 4.0 - 11.0 10e3/uL    RBC Count 3.92 3.80 - 5.20 10e6/uL    Hemoglobin 10.6 (L) 11.7 - 15.7 g/dL    Hematocrit 32.3 (L) 35.0 - 47.0 %     MCV 82 78 - 100 fL    MCH 27.0 26.5 - 33.0 pg    MCHC 32.8 31.5 - 36.5 g/dL    RDW 12.7 10.0 - 15.0 %    Platelet Count 260 150 - 450 10e3/uL    % Neutrophils 77 %    % Lymphocytes 17 %    % Monocytes 5 %    % Eosinophils 1 %    % Basophils 0 %    % Immature Granulocytes 1 %    NRBCs per 100 WBC 0 <1 /100    Absolute Neutrophils 12.2 (H) 1.6 - 8.3 10e3/uL    Absolute Lymphocytes 2.7 0.8 - 5.3 10e3/uL    Absolute Monocytes 0.8 0.0 - 1.3 10e3/uL    Absolute Eosinophils 0.1 0.0 - 0.7 10e3/uL    Absolute Basophils 0.0 0.0 - 0.2 10e3/uL    Absolute Immature Granulocytes 0.1 <=0.4 10e3/uL    Absolute NRBCs 0.0 10e3/uL   Head CT w/o contrast    Narrative    EXAM: CT HEAD W/O CONTRAST, CT FACIAL BONES WITHOUT CONTRAST  LOCATION: [facility]  DATE: 10/4/2024    INDICATION: Trauma  COMPARISON: 6/11/2022.   TECHNIQUE:   1) Routine CT Head without IV contrast. Multiplanar reformats. Dose reduction techniques were used.  2) Routine CT Facial Bones without IV contrast. Multiplanar reformats. Dose reduction techniques were used.    FINDINGS:  HEAD CT:   INTRACRANIAL CONTENTS: No intracranial hemorrhage, extraaxial collection, or mass effect.  No CT evidence of acute infarct. Mild presumed chronic small vessel ischemic changes. Mild generalized volume loss. No hydrocephalus.     OSSEOUS STRUCTURES/SOFT TISSUES: No significant abnormality.     FACIAL BONE CT:  Soft tissues: Ill-defined right parotid mass, measuring 37 x 27 mm in the axial plane (previously 28 x 25 mm).    Nasal bones and septum: No fracture.    Paranasal sinuses: Partial opacification of the right maxillary sinus and right ethmoid air cells with soft tissue fullness throughout the right nasal cavity. Sequela of chronic right maxillary sinusitis. No fracture through the paranasal sinuses.    Orbits: No fracture. Contents within normal limits.    Zygomatic arch, sphenoid wings, pterygoid plates: No fracture.    Skull base: No  fracture.    Mandible: No fracture. Scattered dental disease. Numerous absent teeth.      Impression    IMPRESSION:  HEAD CT:  1.  No CT evidence for acute intracranial process.  2.  Brain atrophy and presumed chronic microvascular ischemic changes as above.    FACIAL BONE CT:  1.  No acute maxillofacial fracture.  2.  Slow-growing right parotid mass; suggest correlation with history.   3.  Soft tissue fullness throughout the right nasal cavity which could represent obstructing lesion; suggest correlation with direct visualization. Associated mucosal thickening throughout the paranasal sinuses.   CT Facial Bones without Contrast    Narrative    EXAM: CT HEAD W/O CONTRAST, CT FACIAL BONES WITHOUT CONTRAST  LOCATION: [facility]  DATE: 10/4/2024    INDICATION: Trauma  COMPARISON: 6/11/2022.   TECHNIQUE:   1) Routine CT Head without IV contrast. Multiplanar reformats. Dose reduction techniques were used.  2) Routine CT Facial Bones without IV contrast. Multiplanar reformats. Dose reduction techniques were used.    FINDINGS:  HEAD CT:   INTRACRANIAL CONTENTS: No intracranial hemorrhage, extraaxial collection, or mass effect.  No CT evidence of acute infarct. Mild presumed chronic small vessel ischemic changes. Mild generalized volume loss. No hydrocephalus.     OSSEOUS STRUCTURES/SOFT TISSUES: No significant abnormality.     FACIAL BONE CT:  Soft tissues: Ill-defined right parotid mass, measuring 37 x 27 mm in the axial plane (previously 28 x 25 mm).    Nasal bones and septum: No fracture.    Paranasal sinuses: Partial opacification of the right maxillary sinus and right ethmoid air cells with soft tissue fullness throughout the right nasal cavity. Sequela of chronic right maxillary sinusitis. No fracture through the paranasal sinuses.    Orbits: No fracture. Contents within normal limits.    Zygomatic arch, sphenoid wings, pterygoid plates: No fracture.    Skull base: No fracture.    Mandible:  No fracture. Scattered dental disease. Numerous absent teeth.      Impression    IMPRESSION:  HEAD CT:  1.  No CT evidence for acute intracranial process.  2.  Brain atrophy and presumed chronic microvascular ischemic changes as above.    FACIAL BONE CT:  1.  No acute maxillofacial fracture.  2.  Slow-growing right parotid mass; suggest correlation with history.   3.  Soft tissue fullness throughout the right nasal cavity which could represent obstructing lesion; suggest correlation with direct visualization. Associated mucosal thickening throughout the paranasal sinuses.   CT Cervical Spine w/o Contrast    Narrative    EXAM: CT CERVICAL SPINE W/O CONTRAST  LOCATION: [facility]  DATE: 10/4/2024    INDICATION: Trauma  COMPARISON: None.  TECHNIQUE: Routine CT Cervical Spine without IV contrast. Multiplanar reformats. Dose reduction techniques were used.    FINDINGS:  VERTEBRAE: Vertebral body heights maintained. No subluxation. No acute fracture. Mild degenerative findings throughout the cervical spine with facet arthropathy, vertebral body osteophyte formation, and disc height loss. C7 bone island.    CANAL/FORAMINA: No discernible spinal canal stenosis.    PARASPINAL: Presumed left apical pulmonary scarring, incompletely characterized. No actionable thyroid nodules.      Impression    IMPRESSION:  1.  No acute traumatic abnormality within the cervical spine.  2.  Presumed left apical pulmonary scarring, incompletely characterized. Recommend nonemergent chest CT for further characterization to exclude suspicious nodule/mass.     Troponin T, High Sensitivity   Result Value Ref Range    Troponin T, High Sensitivity 14 <=14 ng/L         RADIOLOGY  CT Cervical Spine w/o Contrast   Final Result   IMPRESSION:   1.  No acute traumatic abnormality within the cervical spine.   2.  Presumed left apical pulmonary scarring, incompletely characterized. Recommend nonemergent chest CT for further  characterization to exclude suspicious nodule/mass.         CT Facial Bones without Contrast   Final Result   IMPRESSION:   HEAD CT:   1.  No CT evidence for acute intracranial process.   2.  Brain atrophy and presumed chronic microvascular ischemic changes as above.      FACIAL BONE CT:   1.  No acute maxillofacial fracture.   2.  Slow-growing right parotid mass; suggest correlation with history.    3.  Soft tissue fullness throughout the right nasal cavity which could represent obstructing lesion; suggest correlation with direct visualization. Associated mucosal thickening throughout the paranasal sinuses.      Head CT w/o contrast   Final Result   IMPRESSION:   HEAD CT:   1.  No CT evidence for acute intracranial process.   2.  Brain atrophy and presumed chronic microvascular ischemic changes as above.      FACIAL BONE CT:   1.  No acute maxillofacial fracture.   2.  Slow-growing right parotid mass; suggest correlation with history.    3.  Soft tissue fullness throughout the right nasal cavity which could represent obstructing lesion; suggest correlation with direct visualization. Associated mucosal thickening throughout the paranasal sinuses.             EKG:    Rate: 56 bpm  Rhythm: Normal Sinus Rhythm  Axis: Normal  Interval: Increased ME interval  Conduction: 1st degree AV block  QRS: Normal  ST: Normal  T-wave: Normal  QT: Not prolonged  Comparison EKG: No significant change when compared to 11 June 2022  Impression:  No acute ischemic change   I have independently reviewed and interpreted today's EKG, pending Cardiologist read          MEDICATIONS GIVEN IN THE EMERGENCY:  Medications - No data to display    NEW PRESCRIPTIONS STARTED AT TODAY'S ER VISIT  New Prescriptions    No medications on file        I, Lalo Agee, am serving as a scribe to document services personally performed by Qamar Encarnacion D.O., based on my observations and the provider's statements to me.  I, Qamar Encarnacion D.O., attest that Lalo  Cyndie is acting in a scribe capacity, has observed my performance of the services and has documented them in accordance with my direction.     Qamar Encarnacion D.O.  Emergency Medicine  [facility] EMERGENCY ROOM  [address] [address]  [phone]  Dept: [phone]       Qamar Encarnacion,   10/04/24 6133

## 2024-10-04 NOTE — PHARMACY-ADMISSION MEDICATION HISTORY
Pharmacist Admission Medication History    Admission medication history is complete. The information provided in this note is only as accurate as the sources available at the time of the update.    Information Source(s): Patient, Family member, Patient's pharmacy, Clinic records, and CareEverywhere/SureScripts via in-person    Pertinent Information: Spoke with Oleg shearer    Changes made to PTA medication list:  Added: None  Deleted: Zaditor, lidocaine patch, Faxiga-to try it out only   Changed: None    Allergies reviewed with patient and updates made in EHR: yes    Medication History Completed By: Rosalba Palomares PharmD 10/4/2024 2:11 PM    PTA Med List   Medication Sig Last Dose    cholecalciferol 50 MCG (2000 UT) CAPS Take 50 mcg by mouth every evening. Past Week at pm    famotidine (PEPCID) 40 MG tablet Take 40 mg by mouth nightly as needed for heartburn. Unknown at prn    gabapentin (NEURONTIN) 300 MG capsule Take 300 mg by mouth daily as needed for neuropathic pain. 10/4/2024 at am    GOODSENSE ARTHRITIS PAIN 650 MG CR tablet Take 650 mg by mouth every 8 hours as needed for mild pain. Past Week at prn    lisinopril-hydrochlorothiazide (PRINZIDE,ZESTORETIC) 10-12.5 mg per tablet Take 1 tablet by mouth every evening. 10/4/2024 at am    VENTOLIN HFA 90 mcg/actuation inhaler Inhale 1-2 puffs into the lungs every 4 hours as needed for shortness of breath. Past Month

## 2024-10-04 NOTE — ED TRIAGE NOTES
pt takes Lisinopril/HCTZ 10/12.5.  She ran out and so she took her daughters Labetolol 300mg tab this am.  A couple of hours later she went to get up and got lightheaded and fell.  She did hit her head and had a LOC.  She is not on thinners.  Pt speaks hmong and family is en route.

## 2024-10-04 NOTE — H&P
[facility]    History and Physical - Hospitalist Service       Date of Admission:  10/4/2024    Assessment & Plan      Katja Long is a 74 year old female admitted on 10/4/2024. She has a PMH of HTN, pulmonary scaring, lumbar spinal stenosis, GERD who presented to the ER for evaluation of lightheadedness and syncopal episode where she hit her head on wood floor PTA.  Patient takes lisinopril/HCTZ 10/12.5 mg for HTN and ran out of her dose thi AM, thus she decided to take one dose of her daughters labetalol 300 mg. CT head was negative, ED spoke with poison control recommending observation and follow orthostatic vitals. IV fluids initiated and admission under observation status.     Unintentional overdose  -Patient checked her blood pressure this morning it was 160s.  She took her last dose of lisinopril/HCTZ 10/12.5 mg yesterday and decided to take her daughters labetalol 300 mg due to her high blood pressure b/c she ran out of her med.  She experienced lightheadedness, nausea and 1 bout of diarrhea post dose of labetalol and subsequently lost consciousness for approximately 1 minute and hit her head on the wood floor. No SI    Plan:  -ER spoke with poison control: Recommending observation and following orthostatic vitals  -Telemetry initiated and electrolyte replacements    Syncopal episode  Orthostatic positive  -CT head :No CT evidence for acute intracranial process.Brain atrophy and presumed chronic microvascular ischemic changes  -CT cervical spine: No acute traumatic abnormality within the cervical spine  -Remains orthostatic positive after prolonged observation in the ER.  No fluids given.    Plan:  -1 L NS bolus over 2 hours, repeat orthostatic vitals post fluids  -Fall precautions, PT and OT  -Remote telemetry initiated to monitor for arrhythmia    HTN   -BP 120s on admission  -Resume PTA lisinopril/hydrochlorothiazide 10/12.5 mg to start tomorrow with holding  parameters    Hypokalemia  Mild hyponatremia  -Potassium 3.3, sodium 133  -Replace electrolytes per RN protocol  -Follow-up a.m. magnesium, phosphorus and BMP    Leukocytosis  -WBC 16, bedside reports she has mild chronic elevated WBCs.  Denying dysuria, fevers but admits to a chronic unchanged cough  -Peripheral smear from 7/2024 showing mild normocytic anemia with moderate rouleaux formation.  No morphologic features to suggest etiology of neutrophilia  -Could also be elevated secondary to stress response from a fall/syncope  -Monitor a.m. CBC    Mild Normocytic anemia  -Hemoglobin 10.6, hemoglobin from 12/2023 was 12.5  -Patient denies any active source of bleed, no melena, hematochezia, mopped assist or hematemesis    pulmonary scarring  -CT cervical spine:Presumed left apical pulmonary scarring, incompletely characterized. Recommend nonemergent chest CT for further characterization to exclude suspicious nodule/mass.   -Patient follows with clinic outpatient, uses as needed inhaler    Right parotid mass  -Facial bone CT: No acute maxillofacial fracture.Slow-growing right parotid mass; suggest correlation with history. Soft tissue fullness throughout the right nasal cavity which could represent obstructing lesion; suggest correlation with direct visualization. Associated mucosal thickening throughout the paranasal sinuses.  - discusses findings with daughter at bedside, they are aware of this mass, seen by otolaryngology     L4-L5 spinal canal stenosis  right L4-5 and bilateral L5-S1 foraminal stenosis   -Following with neurosurgery outpatient  - on gabapentin 300 mg prn    GERD   -PTA Pepcid resumed          Observation Goals: -diagnostic tests and consults completed and resulted, -vital signs normal or at patient baseline, Nurse to notify provider when observation goals have been met and patient is ready for discharge.  Diet: Combination Diet Low Saturated Fat Na <2400mg Diet  DVT Prophylaxis: Laura Mendoza  Catheter: Not present  Lines: None     Cardiac Monitoring: ACTIVE order. Indication: Syncope- low cardiac risk (24 hours)  Code Status: No CPR- Do NOT Intubate    Clinically Significant Risk Factors Present on Admission        # Hypokalemia: Lowest K = 3.3 mmol/L in last 2 days, will replace as needed  # Hyponatremia: Lowest Na = 133 mmol/L in last 2 days, will monitor as appropriate    # Hypomagnesemia: Lowest Mg = 1.2 mg/dL in last 2 days, will replace as needed       # Hypertension: Noted on problem list      # Anemia: based on hgb <11                  Disposition Plan     Medically Ready for Discharge: Anticipated Tomorrow         The patient's care was discussed with the Attending Physician, Dr. Duque and the pt .    Lianna Bedoya PA-C  Hospitalist Service  [facility]  Securely message with [employer] (more info)  Text page via [facility] Paging/Directory     ______________________________________________________________________    Chief Complaint   Unintentional overdose, Syncope PTA     History is obtained from the patient with the help of her daughter at bedside providing Hmong translation.     History of Present Illness   Katja Long is a 74 year old Hmong speaking female who has a PMH of HTN, pulmonary scaring, lumbar spinal stenosis, GERD who presented to the ER for evaluation of lightheadedness and syncopal episode where she hit her head on wood floor PTA.  Patient takes lisinopril/HCTZ 10/12.5 mg for HTN and ran out of her dose this AM, thus she decided to take one dose of her daughters labetalol 300 mg.  She denies any other injuries from this fall event mild bruising to her left cheek and she experienced nausea and 1 bout of non bloody diarrhea also after taking the labetalol. She otherwise reports she woke up in her normal state of health, no unilateral weakness, decrease sensation, chest pain, shortness of breath, fevers, chills, lower extremity edema but does admit to chronic  nonproductive cough for which she follows outpatient for. She does drink alcohol, denies smoking or recreational drug use. She has her daughter who manages her medications independently, normally walks independently however when she has a sciatica flare, will use a walker.    ED course: Presented afebrile, heart rate 60, blood pressure 118/57 with oxygen of 96% on room air.  Labs showing sodium 133, potassium 3.3, WBC 16, hemoglobin 10.6.  CT cervical spine was negative for traumatic abnormality did show pulmonary scarring.  CT head was negative for acute intracranial process.  CT facial bones showed no maxillofacial fracture but did show a slow-growing right parotid mass and soft tissue fullness throughout the right nasal cavity.    At the time of admission, patient was lying in the bed in no distress on no supplemental oxygen.  She states she is not experiencing any dizziness when lying flat but when trying to sit up she will feel lightheaded.  Denying any acute pain at this time.  States she had no suicidal ideation when taking this medication.  Discussed treatment plan with patient and her daughter at bedside and they were agreeable.  They confirm she is a DNR/DNI      Past Medical History    Past Medical History:   Diagnosis Date    Hypertension     Kidney stone        Past Surgical History   Past Surgical History:   Procedure Laterality Date    EYE SURGERY      HC REMOVAL GALLBLADDER      Description: Cholecystectomy;  Recorded: 03/14/2014;       Prior to Admission Medications   Prior to Admission Medications   Prescriptions Last Dose Informant Patient Reported? Taking?   GOODSENSE ARTHRITIS PAIN 650 MG CR tablet Past Week at prn  Yes Yes   Sig: Take 650 mg by mouth every 8 hours as needed for mild pain.   VENTOLIN HFA 90 mcg/actuation inhaler Past Month  Yes Yes   Sig: Inhale 1-2 puffs into the lungs every 4 hours as needed for shortness of breath.   cholecalciferol 50 MCG (2000 UT) CAPS Past Week at pm  Yes  Yes   Sig: Take 50 mcg by mouth every evening.   famotidine (PEPCID) 40 MG tablet Unknown at prn  Yes Yes   Sig: Take 40 mg by mouth nightly as needed for heartburn.   gabapentin (NEURONTIN) 300 MG capsule 10/4/2024 at am  Yes Yes   Sig: Take 300 mg by mouth daily as needed for neuropathic pain.   lisinopril-hydrochlorothiazide (PRINZIDE,ZESTORETIC) 10-12.5 mg per tablet 10/4/2024 at am  Yes Yes   Sig: Take 1 tablet by mouth every evening.      Facility-Administered Medications: None        Review of Systems    Review of Systems   Constitutional:  Negative for chills, diaphoresis, fever, malaise/fatigue and weight loss.   Eyes:  Negative for blurred vision, double vision and photophobia.   Respiratory:  Negative for cough, shortness of breath and wheezing.    Cardiovascular:  Negative for chest pain, palpitations and orthopnea.   Gastrointestinal:  Positive for diarrhea (once), nausea and vomiting (once). Negative for abdominal pain, blood in stool, heartburn and melena.   Genitourinary:  Negative for flank pain, frequency and urgency.   Skin:  Negative for itching and rash.   Neurological:  Positive for dizziness, loss of consciousness and headaches. Negative for tingling, tremors, sensory change, speech change, focal weakness, seizures and weakness.   Psychiatric/Behavioral:  Negative for depression.          Physical Exam   Vital Signs: Temp: 97.4  F (36.3  C) Temp src: Oral BP: 91/54 Pulse: 59   Resp: 16 SpO2: 96 % O2 Device: None (Room air)    Weight: 0 lbs 0 oz    Physical Exam  Constitutional:       General: She is not in acute distress.     Appearance: She is not ill-appearing, toxic-appearing or diaphoretic.      Comments: Frail elderly female   HENT:      Head: Normocephalic and atraumatic.      Comments: Minimal bruising to the left under eye  Eyes:      Comments: EOM intact   Cardiovascular:      Rate and Rhythm: Normal rate and regular rhythm.      Heart sounds: No murmur heard.     No friction rub.    Pulmonary:      Effort: No respiratory distress.      Breath sounds: No wheezing or rales.   Abdominal:      General: Bowel sounds are normal.      Palpations: Abdomen is soft.      Tenderness: There is no abdominal tenderness.   Neurological:      General: No focal deficit present.      Mental Status: She is alert and oriented to person, place, and time.      Cranial Nerves: No cranial nerve deficit or facial asymmetry.      Sensory: Sensation is intact.      Motor: Motor function is intact.      Coordination: Coordination is intact.   Psychiatric:         Mood and Affect: Mood normal.         Behavior: Behavior normal.           Medical Decision Making       75 MINUTES SPENT BY ME on the date of service doing chart review, history, exam, documentation & further activities per the note.      Data     I have personally reviewed the following data over the past 24 hrs:    16.0 (H)  \   10.6 (L)   / 260     133 (L) 97 (L) 19.3 /  113 (H)   3.3 (L) 25 0.90 \     Trop: 14 BNP: N/A       Imaging results reviewed over the past 24 hrs:   Recent Results (from the past 24 hour(s))   Head CT w/o contrast    Narrative    EXAM: CT HEAD W/O CONTRAST, CT FACIAL BONES WITHOUT CONTRAST  LOCATION: [facility]  DATE: 10/4/2024    INDICATION: Trauma  COMPARISON: 6/11/2022.   TECHNIQUE:   1) Routine CT Head without IV contrast. Multiplanar reformats. Dose reduction techniques were used.  2) Routine CT Facial Bones without IV contrast. Multiplanar reformats. Dose reduction techniques were used.    FINDINGS:  HEAD CT:   INTRACRANIAL CONTENTS: No intracranial hemorrhage, extraaxial collection, or mass effect.  No CT evidence of acute infarct. Mild presumed chronic small vessel ischemic changes. Mild generalized volume loss. No hydrocephalus.     OSSEOUS STRUCTURES/SOFT TISSUES: No significant abnormality.     FACIAL BONE CT:  Soft tissues: Ill-defined right parotid mass, measuring 37 x 27 mm in the axial plane  (previously 28 x 25 mm).    Nasal bones and septum: No fracture.    Paranasal sinuses: Partial opacification of the right maxillary sinus and right ethmoid air cells with soft tissue fullness throughout the right nasal cavity. Sequela of chronic right maxillary sinusitis. No fracture through the paranasal sinuses.    Orbits: No fracture. Contents within normal limits.    Zygomatic arch, sphenoid wings, pterygoid plates: No fracture.    Skull base: No fracture.    Mandible: No fracture. Scattered dental disease. Numerous absent teeth.      Impression    IMPRESSION:  HEAD CT:  1.  No CT evidence for acute intracranial process.  2.  Brain atrophy and presumed chronic microvascular ischemic changes as above.    FACIAL BONE CT:  1.  No acute maxillofacial fracture.  2.  Slow-growing right parotid mass; suggest correlation with history.   3.  Soft tissue fullness throughout the right nasal cavity which could represent obstructing lesion; suggest correlation with direct visualization. Associated mucosal thickening throughout the paranasal sinuses.   CT Facial Bones without Contrast    Narrative    EXAM: CT HEAD W/O CONTRAST, CT FACIAL BONES WITHOUT CONTRAST  LOCATION: [facility]  DATE: 10/4/2024    INDICATION: Trauma  COMPARISON: 6/11/2022.   TECHNIQUE:   1) Routine CT Head without IV contrast. Multiplanar reformats. Dose reduction techniques were used.  2) Routine CT Facial Bones without IV contrast. Multiplanar reformats. Dose reduction techniques were used.    FINDINGS:  HEAD CT:   INTRACRANIAL CONTENTS: No intracranial hemorrhage, extraaxial collection, or mass effect.  No CT evidence of acute infarct. Mild presumed chronic small vessel ischemic changes. Mild generalized volume loss. No hydrocephalus.     OSSEOUS STRUCTURES/SOFT TISSUES: No significant abnormality.     FACIAL BONE CT:  Soft tissues: Ill-defined right parotid mass, measuring 37 x 27 mm in the axial plane (previously 28 x 25  mm).    Nasal bones and septum: No fracture.    Paranasal sinuses: Partial opacification of the right maxillary sinus and right ethmoid air cells with soft tissue fullness throughout the right nasal cavity. Sequela of chronic right maxillary sinusitis. No fracture through the paranasal sinuses.    Orbits: No fracture. Contents within normal limits.    Zygomatic arch, sphenoid wings, pterygoid plates: No fracture.    Skull base: No fracture.    Mandible: No fracture. Scattered dental disease. Numerous absent teeth.      Impression    IMPRESSION:  HEAD CT:  1.  No CT evidence for acute intracranial process.  2.  Brain atrophy and presumed chronic microvascular ischemic changes as above.    FACIAL BONE CT:  1.  No acute maxillofacial fracture.  2.  Slow-growing right parotid mass; suggest correlation with history.   3.  Soft tissue fullness throughout the right nasal cavity which could represent obstructing lesion; suggest correlation with direct visualization. Associated mucosal thickening throughout the paranasal sinuses.   CT Cervical Spine w/o Contrast    Narrative    EXAM: CT CERVICAL SPINE W/O CONTRAST  LOCATION: [facility]  DATE: 10/4/2024    INDICATION: Trauma  COMPARISON: None.  TECHNIQUE: Routine CT Cervical Spine without IV contrast. Multiplanar reformats. Dose reduction techniques were used.    FINDINGS:  VERTEBRAE: Vertebral body heights maintained. No subluxation. No acute fracture. Mild degenerative findings throughout the cervical spine with facet arthropathy, vertebral body osteophyte formation, and disc height loss. C7 bone island.    CANAL/FORAMINA: No discernible spinal canal stenosis.    PARASPINAL: Presumed left apical pulmonary scarring, incompletely characterized. No actionable thyroid nodules.      Impression    IMPRESSION:  1.  No acute traumatic abnormality within the cervical spine.  2.  Presumed left apical pulmonary scarring, incompletely characterized. Recommend  nonemergent chest CT for further characterization to exclude suspicious nodule/mass.

## 2024-10-05 LAB
ATRIAL RATE - MUSE: 56 BPM
DIASTOLIC BLOOD PRESSURE - MUSE: 57 MMHG
INTERPRETATION ECG - MUSE: NORMAL
P AXIS - MUSE: 55 DEGREES
PR INTERVAL - MUSE: 210 MS
QRS DURATION - MUSE: 100 MS
QT - MUSE: 488 MS
QTC - MUSE: 470 MS
R AXIS - MUSE: -49 DEGREES
SYSTOLIC BLOOD PRESSURE - MUSE: 118 MMHG
T AXIS - MUSE: 42 DEGREES
VENTRICULAR RATE- MUSE: 56 BPM

## 2024-10-05 NOTE — DISCHARGE SUMMARY
[facility] MEDICINE  DISCHARGE SUMMARY     Primary Care Physician: Vaughn Moreau  Admission Date: 10/4/2024   Discharge Provider: Qamar Duque DO Discharge Date: 10/4/2024   Diet:   Active Diet and Nourishment Order   Procedures    Combination Diet Low Saturated Fat Na <2400mg Diet    Diet       Code Status: No CPR- Do NOT Intubate   Activity: As tolerated        Condition at Discharge: Stable     REASON FOR PRESENTATION(See Admission Note for Details)   Loss of consciousness, fall  PRINCIPAL & ACTIVE DISCHARGE DIAGNOSES   Inadvertent medication overdose  Orthostatic hypotension with syncope  Soft tissue injury of the face  Essential hypertension  PENDING LABS     Unresulted Labs Ordered in the Past 30 Days of this Admission       No orders found from 9/4/2024 to 10/5/2024.          PROCEDURES ( this hospitalization only)      RECOMMENDATIONS TO OUTPATIENT PROVIDER FOR F/U VISIT   Follow-up Appointments     Follow-up and recommended labs and tests       Follow up with primary care provider as needed for your usual medical   care.          DISPOSITION   Home  SUMMARY OF HOSPITAL COURSE:    74-year-old female had run out of her blood pressure medications at home.  She checked her blood pressure at home and found it to be elevated.  She knew that her daughter had a blood pressure medication and took her daughters 300 mg labetalol.  Soon after she was lightheaded upon standing, passed out and struck her face.  In the emergency department she had orthostatic hypotension and was symptomatic.  Chest x-ray with bronchiectasis but no focal pneumonia.  CT cervical spine negative.  CT facial bones negative for fracture but did show a slow-growing right parotid mass, suggest follow-up.  CT head was negative for intracranial process.  Discharge Medications with Med changes:     Discharge Medication List as of 10/4/2024  7:04 PM        CONTINUE these medications which have NOT CHANGED     Details   cholecalciferol 50 MCG (2000 UT) CAPS Take 50 mcg by mouth every evening., Historical      famotidine (PEPCID) 40 MG tablet Take 40 mg by mouth nightly as needed for heartburn., Historical      gabapentin (NEURONTIN) 300 MG capsule Take 300 mg by mouth daily as needed for neuropathic pain., Historical      GOODSENSE ARTHRITIS PAIN 650 MG CR tablet Take 650 mg by mouth every 8 hours as needed for mild pain., SUSAN, Historical      lisinopril-hydrochlorothiazide (PRINZIDE,ZESTORETIC) 10-12.5 mg per tablet Take 1 tablet by mouth every evening., R-4, Historical      VENTOLIN HFA 90 mcg/actuation inhaler Inhale 1-2 puffs into the lungs every 4 hours as needed for shortness of breath., R-1, SUSAN, Historical           Rationale for medication changes:    Not applicable  Consults   None  Anticoagulation Information    Not applicable  SIGNIFICANT IMAGING FINDINGS     Results for orders placed or performed during the hospital encounter of 10/04/24   Head CT w/o contrast    Impression    IMPRESSION:  HEAD CT:  1.  No CT evidence for acute intracranial process.  2.  Brain atrophy and presumed chronic microvascular ischemic changes as above.    FACIAL BONE CT:  1.  No acute maxillofacial fracture.  2.  Slow-growing right parotid mass; suggest correlation with history.   3.  Soft tissue fullness throughout the right nasal cavity which could represent obstructing lesion; suggest correlation with direct visualization. Associated mucosal thickening throughout the paranasal sinuses.   CT Facial Bones without Contrast    Impression    IMPRESSION:  HEAD CT:  1.  No CT evidence for acute intracranial process.  2.  Brain atrophy and presumed chronic microvascular ischemic changes as above.    FACIAL BONE CT:  1.  No acute maxillofacial fracture.  2.  Slow-growing right parotid mass; suggest correlation with history.   3.  Soft tissue fullness throughout the right nasal cavity which could represent obstructing lesion; suggest  correlation with direct visualization. Associated mucosal thickening throughout the paranasal sinuses.   CT Cervical Spine w/o Contrast    Impression    IMPRESSION:  1.  No acute traumatic abnormality within the cervical spine.  2.  Presumed left apical pulmonary scarring, incompletely characterized. Recommend nonemergent chest CT for further characterization to exclude suspicious nodule/mass.     XR Chest Port 1 View    Impression    IMPRESSION:     Mildly enlarged cardiac silhouette mild aortic atheromatous calcifications. The vascular pedicle is normal.    Left apical pleural parenchymal scarring. Both lungs are lucent suggesting air trapping/hyperinflation. Peripheral tubular opacities are present in the right base consistent with peripheral bronchiectasis. No lung consolidation. No pleural effusion.     SIGNIFICANT LABORATORY FINDINGS   Magnesium of 1.2, potassium 3.3  White count 16.0  Hemoglobin 10.6  Discharge Orders        Reason for your hospital stay    Low blood pressure with standing, passing out.     Follow-up and recommended labs and tests     Follow up with primary care provider as needed for your usual medical care.     Activity    Your activity upon discharge: activity as tolerated     Diet    Follow this diet upon discharge: Current Diet:Orders Placed This Encounter      Combination Diet Low Saturated Fat Na <2400mg Diet     Examination   Physical Exam   Temp:  [97.4  F (36.3  C)-97.5  F (36.4  C)] 97.5  F (36.4  C)  Pulse:  [56-65] 61  Resp:  [16-58] 20  BP: ()/(50-65) 118/56  SpO2:  [95 %-98 %] 96 %  Wt Readings from Last 1 Encounters:   10/04/24 44.8 kg (98 lb 12.3 oz)       Subjective: I was called on the evening of admission as patient's blood pressure had improved and she was no longer lightheaded upon standing.  Went to see her and she was feeling well.  I had her stand and walk in the room and she did not have any symptoms.  She was discharged in stable condition.  She already had  her medications delivered to home and did not need any new prescriptions.      Please see EMR for more detailed significant labs, imaging, consultant notes etc.    IQamar DO, personally saw the patient today and spent less than or equal to 30 minutes discharging this patient.    Qamar Duque DO  [facility]    CC:Vaughn Moreau

## 2024-10-05 NOTE — PLAN OF CARE
Discharge instructions discussed with patient and daughter at bedside; verbalized understanding. Discharging to home with daughter to transport. Patient is stable at the time of discharge.

## 2024-10-05 NOTE — ED NOTES
Placed orders for EKG.  Date of exam was on 10/04/2024 at 09:43  Jenny Waldron RN 10/5/2024 6:44 PM

## 2024-10-21 ENCOUNTER — OFFICE VISIT (OUTPATIENT)
Dept: FAMILY MEDICINE | Facility: CLINIC | Age: 74
End: 2024-10-21
Payer: COMMERCIAL

## 2024-10-21 VITALS
HEIGHT: 60 IN | TEMPERATURE: 98.7 F | HEART RATE: 64 BPM | RESPIRATION RATE: 20 BRPM | SYSTOLIC BLOOD PRESSURE: 148 MMHG | BODY MASS INDEX: 18.12 KG/M2 | WEIGHT: 92.3 LBS | DIASTOLIC BLOOD PRESSURE: 75 MMHG

## 2024-10-21 DIAGNOSIS — R55 SYNCOPE, UNSPECIFIED SYNCOPE TYPE: ICD-10-CM

## 2024-10-21 DIAGNOSIS — I10 ESSENTIAL HYPERTENSION, BENIGN: Primary | ICD-10-CM

## 2024-10-21 DIAGNOSIS — Z23 NEED FOR VACCINATION: ICD-10-CM

## 2024-10-21 DIAGNOSIS — K21.9 GASTROESOPHAGEAL REFLUX DISEASE WITHOUT ESOPHAGITIS: ICD-10-CM

## 2024-10-21 LAB
ANION GAP SERPL CALCULATED.3IONS-SCNC: 12 MMOL/L (ref 7–15)
BUN SERPL-MCNC: 27.7 MG/DL (ref 8–23)
CALCIUM SERPL-MCNC: 10.2 MG/DL (ref 8.8–10.4)
CHLORIDE SERPL-SCNC: 95 MMOL/L (ref 98–107)
CREAT SERPL-MCNC: 1.03 MG/DL (ref 0.51–0.95)
EGFRCR SERPLBLD CKD-EPI 2021: 57 ML/MIN/1.73M2
GLUCOSE SERPL-MCNC: 106 MG/DL (ref 70–99)
HCO3 SERPL-SCNC: 26 MMOL/L (ref 22–29)
POTASSIUM SERPL-SCNC: 3.9 MMOL/L (ref 3.4–5.3)
SODIUM SERPL-SCNC: 133 MMOL/L (ref 135–145)

## 2024-10-21 PROCEDURE — 91320 SARSCV2 VAC 30MCG TRS-SUC IM: CPT | Performed by: FAMILY MEDICINE

## 2024-10-21 PROCEDURE — 80048 BASIC METABOLIC PNL TOTAL CA: CPT | Performed by: FAMILY MEDICINE

## 2024-10-21 PROCEDURE — 99214 OFFICE O/P EST MOD 30 MIN: CPT | Mod: 25 | Performed by: FAMILY MEDICINE

## 2024-10-21 PROCEDURE — 36415 COLL VENOUS BLD VENIPUNCTURE: CPT | Performed by: FAMILY MEDICINE

## 2024-10-21 PROCEDURE — 90480 ADMN SARSCOV2 VAC 1/ONLY CMP: CPT | Performed by: FAMILY MEDICINE

## 2024-10-21 PROCEDURE — 90662 IIV NO PRSV INCREASED AG IM: CPT | Performed by: FAMILY MEDICINE

## 2024-10-21 PROCEDURE — 90471 IMMUNIZATION ADMIN: CPT | Performed by: FAMILY MEDICINE

## 2024-10-21 RX ORDER — LISINOPRIL AND HYDROCHLOROTHIAZIDE 12.5; 2 MG/1; MG/1
1 TABLET ORAL DAILY
Qty: 90 TABLET | Refills: 0 | Status: SHIPPED | OUTPATIENT
Start: 2024-10-21

## 2024-10-21 RX ORDER — FAMOTIDINE 40 MG/1
40 TABLET, FILM COATED ORAL
Qty: 90 TABLET | Refills: 0 | Status: SHIPPED | OUTPATIENT
Start: 2024-10-21

## 2024-10-21 RX ORDER — KETOTIFEN FUMARATE 0.35 MG/ML
1 SOLUTION/ DROPS OPHTHALMIC 2 TIMES DAILY
Status: CANCELLED | OUTPATIENT
Start: 2024-10-21

## 2024-10-21 RX ORDER — KETOTIFEN FUMARATE 0.35 MG/ML
1 SOLUTION/ DROPS OPHTHALMIC 2 TIMES DAILY
COMMUNITY
Start: 2023-11-22

## 2024-10-21 NOTE — LETTER
October 22, 2024      Katja Long  [address] [address]        Dear ,    We are writing to inform you of your test results.  Sodium is just a few points low but similar to prior values, creatinine the kidney test is just slightly elevated, but start the new higher dose of blood pressure medication.  I want you to follow-up with your regular doctor in a month or so for follow-up blood pressure.        Resulted Orders   Basic metabolic panel   Result Value Ref Range    Sodium 133 (L) 135 - 145 mmol/L    Potassium 3.9 3.4 - 5.3 mmol/L    Chloride 95 (L) 98 - 107 mmol/L    Carbon Dioxide (CO2) 26 22 - 29 mmol/L    Anion Gap 12 7 - 15 mmol/L    Urea Nitrogen 27.7 (H) 8.0 - 23.0 mg/dL    Creatinine 1.03 (H) 0.51 - 0.95 mg/dL    GFR Estimate 57 (L) >60 mL/min/1.73m2      Comment:      eGFR calculated using 2021 CKD-EPI equation.    Calcium 10.2 8.8 - 10.4 mg/dL      Comment:      Reference intervals for this test were updated on 7/16/2024 to reflect our healthy population more accurately. There may be differences in the flagging of prior results with similar values performed with this method. Those prior results can be interpreted in the context of the updated reference intervals.    Glucose 106 (H) 70 - 99 mg/dL       If you have any questions or concerns, please call the clinic at the number listed above.       Sincerely,      Jim Washington MD

## 2024-10-21 NOTE — PROGRESS NOTES
Assessment & Plan     (I10) Benign Essential Hypertension  (primary encounter diagnosis)  Comment: Suboptimal control  Plan: lisinopril-hydrochlorothiazide (ZESTORETIC)         20-12.5 MG tablet, Basic metabolic panel,         PRIMARY CARE FOLLOW-UP SCHEDULING             (K21.9) Gastroesophageal reflux disease without esophagitis  Comment: Controlled with Pepcid  Plan: famotidine (PEPCID) 40 MG tablet             (R55) Syncope, unspecified syncope type  Comment: Patient had a recent visit for syncope secondary to low blood pressure itself secondary to accidental overdose of blood pressure medication  Plan:      (Z23) Need for vaccination  Comment:    Plan: INFLUENZA HIGH DOSE, TRIVALENT, PF (FLUZONE),         COVID-19 12+ (PFIZER)             PLAN:  1.  Influenza and COVID-vaccine  2.  Change the lisinopril hydrochlorothiazide combination to 20/12.5 from 10/12.5  3.  Recheck basic metabolic profile  4.  Would like the patient to follow-up with her primary in a month or so  5.  The longitudinal plan of care for the diagnosis(es)/condition(s) as documented were addressed during this visit. Due to the added complexity in care, I will continue to support Katja in the subsequent management and with ongoing continuity of care.          MED REC REQUIRED  Post Medication Reconciliation Status: discharge medications reconciled and changed, per note/orders        Subjective   Katja is a 74 year old, presenting for the following health issues:  Follow Up (f/u on high blood pressure, even with meds still high)        10/21/2024     8:49 AM   Additional Questions   Roomed by Zoey   Accompanied by Daughter     History of Present Illness       Hypertension: She presents for follow up of hypertension.  She does check blood pressure  regularly outside of the clinic. Outside blood pressures have been over 140/90. She follows a low salt diet.     She eats 2-3 servings of fruits and vegetables daily.She consumes 0 sweetened beverage(s)  daily.She exercises with enough effort to increase her heart rate 9 or less minutes per day.  She exercises with enough effort to increase her heart rate 3 or less days per week. She is missing 5 dose(s) of medications per week.     The patient comes in with her daughter for follow-up blood pressure.  Of note the patient was just in the emergency room on October 4 she inadvertently took too much blood pressure medication she took her daughter's labetalol and her blood pressure became too low and she almost passed out, she is on a lisinopril hydrochlorothiazide combination    For the past 2 weeks or so her blood pressure has actually been elevated and is elevated here in the office there is room to increase her blood pressure medication.    I do note that when she was seen in the emergency room her sodium was a bit low therefore I am can increase the lisinopril component but not the hydrochlorothiazide component and I want to recheck her kidney test.    Patient is also on famotidine for reflux we will renew that as well.    I do note that the patient is behind on preventive maintenance issues will have her follow-up with her primary.                        Objective    BP (!) 148/75 (BP Location: Left arm, Patient Position: Sitting, Cuff Size: Adult Regular)   Pulse 64   Temp 98.7  F (37.1  C) (Oral)   Resp 20   Ht 1.524 m (5')   Wt 41.9 kg (92 lb 4.8 oz)   BMI 18.03 kg/m    Body mass index is 18.03 kg/m .  Physical Exam               Signed Electronically by: Jim Washington MD

## 2024-12-03 ENCOUNTER — OFFICE VISIT (OUTPATIENT)
Dept: FAMILY MEDICINE | Facility: CLINIC | Age: 74
End: 2024-12-03
Attending: FAMILY MEDICINE
Payer: COMMERCIAL

## 2024-12-03 VITALS
BODY MASS INDEX: 16.88 KG/M2 | HEART RATE: 71 BPM | DIASTOLIC BLOOD PRESSURE: 74 MMHG | RESPIRATION RATE: 12 BRPM | HEIGHT: 60 IN | TEMPERATURE: 98.8 F | WEIGHT: 86 LBS | SYSTOLIC BLOOD PRESSURE: 136 MMHG | OXYGEN SATURATION: 97 %

## 2024-12-03 DIAGNOSIS — I10 ESSENTIAL HYPERTENSION, BENIGN: ICD-10-CM

## 2024-12-03 DIAGNOSIS — M54.41 CHRONIC RIGHT-SIDED LOW BACK PAIN WITH RIGHT-SIDED SCIATICA: Primary | ICD-10-CM

## 2024-12-03 DIAGNOSIS — Z12.11 SCREEN FOR COLON CANCER: ICD-10-CM

## 2024-12-03 DIAGNOSIS — G89.29 CHRONIC RIGHT-SIDED LOW BACK PAIN WITH RIGHT-SIDED SCIATICA: Primary | ICD-10-CM

## 2024-12-03 DIAGNOSIS — E83.42 HYPOMAGNESEMIA: ICD-10-CM

## 2024-12-03 DIAGNOSIS — Z12.31 VISIT FOR SCREENING MAMMOGRAM: ICD-10-CM

## 2024-12-03 DIAGNOSIS — E87.1 HYPONATREMIA: ICD-10-CM

## 2024-12-03 DIAGNOSIS — Z13.220 SCREENING CHOLESTEROL LEVEL: ICD-10-CM

## 2024-12-03 DIAGNOSIS — Z11.59 NEED FOR HEPATITIS C SCREENING TEST: ICD-10-CM

## 2024-12-03 DIAGNOSIS — Z78.0 POST-MENOPAUSE: ICD-10-CM

## 2024-12-03 DIAGNOSIS — Z78.9 NON-ENGLISH SPEAKING PATIENT: ICD-10-CM

## 2024-12-03 LAB
ALT SERPL W P-5'-P-CCNC: 8 U/L (ref 0–50)
AST SERPL W P-5'-P-CCNC: 20 U/L (ref 0–45)
CHOLEST SERPL-MCNC: 218 MG/DL
CREAT SERPL-MCNC: 0.79 MG/DL (ref 0.51–0.95)
EGFRCR SERPLBLD CKD-EPI 2021: 79 ML/MIN/1.73M2
FASTING STATUS PATIENT QL REPORTED: ABNORMAL
HCV AB SERPL QL IA: NONREACTIVE
HDLC SERPL-MCNC: 56 MG/DL
LDLC SERPL CALC-MCNC: 142 MG/DL
MAGNESIUM SERPL-MCNC: 1.4 MG/DL (ref 1.7–2.3)
NONHDLC SERPL-MCNC: 162 MG/DL
POTASSIUM SERPL-SCNC: 3.8 MMOL/L (ref 3.4–5.3)
SODIUM SERPL-SCNC: 134 MMOL/L (ref 135–145)
TRIGL SERPL-MCNC: 98 MG/DL
VIT D+METAB SERPL-MCNC: 44 NG/ML (ref 20–50)

## 2024-12-03 PROCEDURE — 36415 COLL VENOUS BLD VENIPUNCTURE: CPT | Performed by: NURSE PRACTITIONER

## 2024-12-03 PROCEDURE — 84450 TRANSFERASE (AST) (SGOT): CPT | Performed by: NURSE PRACTITIONER

## 2024-12-03 PROCEDURE — 86803 HEPATITIS C AB TEST: CPT | Performed by: NURSE PRACTITIONER

## 2024-12-03 PROCEDURE — 80061 LIPID PANEL: CPT | Performed by: NURSE PRACTITIONER

## 2024-12-03 PROCEDURE — 99214 OFFICE O/P EST MOD 30 MIN: CPT | Performed by: NURSE PRACTITIONER

## 2024-12-03 PROCEDURE — 84460 ALANINE AMINO (ALT) (SGPT): CPT | Performed by: NURSE PRACTITIONER

## 2024-12-03 PROCEDURE — 84132 ASSAY OF SERUM POTASSIUM: CPT | Performed by: NURSE PRACTITIONER

## 2024-12-03 PROCEDURE — 83735 ASSAY OF MAGNESIUM: CPT | Performed by: NURSE PRACTITIONER

## 2024-12-03 PROCEDURE — 84295 ASSAY OF SERUM SODIUM: CPT | Performed by: NURSE PRACTITIONER

## 2024-12-03 PROCEDURE — 82306 VITAMIN D 25 HYDROXY: CPT | Performed by: NURSE PRACTITIONER

## 2024-12-03 PROCEDURE — 82565 ASSAY OF CREATININE: CPT | Performed by: NURSE PRACTITIONER

## 2024-12-03 NOTE — PATIENT INSTRUCTIONS
Can take 500 mg TID helps with pain.     Calcium is 1200 mg per day. Can be through diet or supplement.     Vitamin D 2000 international unit(s) per day

## 2024-12-03 NOTE — PROGRESS NOTES
Assessment & Plan     Benign Essential Hypertension  *Blood pressure stable today.  Losartan increased at last visit.  Will check sodium levels to ensure normal.  Has been low in the past.  If lower, will consider stopping hydrochlorothiazide.  Previous blood pressures reviewed.   - PRIMARY CARE FOLLOW-UP SCHEDULING  - Lipid panel reflex to direct LDL Non-fasting  - Potassium  - Creatinine  - Lipid panel reflex to direct LDL Non-fasting  - Potassium  - Creatinine    Visit for screening mammogram  *She declined mammogram benefits discussed today she will think about it,   - MA Screening Bilateral w/ Aguila    Screen for colon cancer  *  - Colonoscopy Screening  Referral    Need for hepatitis C screening test  *  - Hepatitis C Screen Reflex to HCV RNA Quant and Genotype  - Hepatitis C Screen Reflex to HCV RNA Quant and Genotype    Post-menopause  *-Not taking any vitamins.  Osteoporosis prevention discussed today.  See after visit summary.  Will check vitamin D to ensure normal.  Will use guidelines for medication if needed.   - DEXA HIP/PELVIS/SPINE - Future  - Vitamin D deficiency screening  - Vitamin D deficiency screening    Hypomagnesemia  *Magnesium low in the past.  Not sure of trigger.  Not on PPI.  Will check today.   - Magnesium  - Magnesium    Chronic right-sided low back pain with right-sided sciatica  *Chronic low back pain.  Appears as sciatica.  Had injection in the past which was helpful.  The etiology behind back pain and flares discussed today.  I also spent time discussing conservative ways to manage her symptoms, and demonstrated stretches with her today.  She can apply Voltaren gel to the area as needed.  Apply ice and heat over it was also effective.  Daily stretching is important.   - diclofenac (VOLTAREN) 1 % topical gel  Dispense: 100 g; Refill: 0  - Orthopedic  Referral    Screening cholesterol level  *  -Lipids  - AST  - ALT  - AST  - ALT    Hyponatremia  *  - Sodium  -  "Sodium    Non-English speaking patient  I used video  today.  Her daughter is her main spokesperson.  I discussed with family member that forms need to be filled out stating this.     Declined Medicare wellness exam.       Subjective   Katja is a 74 year old, presenting for the following health issues:  Referral and BP Follow Up    R sided lumbar and hip pain, weakness, gait impairments, poor posture. Seen PHYSICAL THERAPY in past. Would like an injection, this was helpful. Taking Tylenol PRN.   - saw neurology in March 2024. Had MRI.   - pain goes down outside of right thigh, starts in right buttock. No numbness or tingling.   - no ice or heat   - pain patches can help.   - taking medications as directed.  Not checking blood pressure at home.   She declined mammogram but will think about it.  Benefits of screening discussed.   -Not taking vitamins.         12/3/2024     8:17 AM   Additional Questions   Roomed by Kimberlee P   Accompanied by Son in Law \"K\"     History of Present Illness       Reason for visit:  F/u referral for steriod shot                  Review of Systems  Constitutional, HEENT, cardiovascular, pulmonary, gi and gu systems are negative, except as otherwise noted.      Objective    /74   Pulse 71   Temp 98.8  F (37.1  C) (Temporal)   Resp 12   Ht 1.524 m (5')   Wt 39 kg (86 lb)   LMP  (LMP Unknown)   SpO2 97%   BMI 16.80 kg/m    Body mass index is 16.8 kg/m .  Physical Exam   GENERAL: alert and no distress  MS: no gross musculoskeletal defects noted, no edema  MS: spine exam shows ROM is normal    Office Visit on 10/21/2024   Component Date Value Ref Range Status    Sodium 10/21/2024 133 (L)  135 - 145 mmol/L Final    Potassium 10/21/2024 3.9  3.4 - 5.3 mmol/L Final    Chloride 10/21/2024 95 (L)  98 - 107 mmol/L Final    Carbon Dioxide (CO2) 10/21/2024 26  22 - 29 mmol/L Final    Anion Gap 10/21/2024 12  7 - 15 mmol/L Final    Urea Nitrogen 10/21/2024 27.7 (H)  8.0 - 23.0 mg/dL " Final    Creatinine 10/21/2024 1.03 (H)  0.51 - 0.95 mg/dL Final    GFR Estimate 10/21/2024 57 (L)  >60 mL/min/1.73m2 Final    eGFR calculated using 2021 CKD-EPI equation.    Calcium 10/21/2024 10.2  8.8 - 10.4 mg/dL Final    Reference intervals for this test were updated on 7/16/2024 to reflect our healthy population more accurately. There may be differences in the flagging of prior results with similar values performed with this method. Those prior results can be interpreted in the context of the updated reference intervals.    Glucose 10/21/2024 106 (H)  70 - 99 mg/dL Final     No results found for any visits on 12/03/24.  No results found for this or any previous visit (from the past 24 hours).        Signed Electronically by: YAA Stafford CNP

## 2025-01-11 ENCOUNTER — HOSPITAL ENCOUNTER (INPATIENT)
Facility: CLINIC | Age: 75
LOS: 3 days | Discharge: HOME | End: 2025-01-15
Attending: EMERGENCY MEDICINE | Admitting: STUDENT IN AN ORGANIZED HEALTH CARE EDUCATION/TRAINING PROGRAM
Payer: COMMERCIAL

## 2025-01-11 ENCOUNTER — APPOINTMENT (OUTPATIENT)
Dept: CT IMAGING | Facility: CLINIC | Age: 75
End: 2025-01-11
Attending: EMERGENCY MEDICINE
Payer: COMMERCIAL

## 2025-01-11 DIAGNOSIS — K21.9 GASTROESOPHAGEAL REFLUX DISEASE, UNSPECIFIED WHETHER ESOPHAGITIS PRESENT: Primary | ICD-10-CM

## 2025-01-11 DIAGNOSIS — J98.01 ACUTE BRONCHOSPASM: ICD-10-CM

## 2025-01-11 DIAGNOSIS — E87.1 HYPONATREMIA: ICD-10-CM

## 2025-01-11 DIAGNOSIS — E83.42 HYPOMAGNESEMIA: ICD-10-CM

## 2025-01-11 DIAGNOSIS — I71.21 ANEURYSM OF ASCENDING AORTA WITHOUT RUPTURE: ICD-10-CM

## 2025-01-11 DIAGNOSIS — R09.02 HYPOXEMIA: ICD-10-CM

## 2025-01-11 DIAGNOSIS — I10 ESSENTIAL HYPERTENSION, BENIGN: ICD-10-CM

## 2025-01-11 DIAGNOSIS — J10.1 INFLUENZA A: ICD-10-CM

## 2025-01-11 DIAGNOSIS — Z22.7 LATENT TUBERCULOSIS: ICD-10-CM

## 2025-01-11 DIAGNOSIS — A41.9 ACUTE SEPSIS (H): ICD-10-CM

## 2025-01-11 LAB
ALBUMIN SERPL BCG-MCNC: 3.6 G/DL (ref 3.5–5.2)
ALP SERPL-CCNC: NORMAL U/L
ALT SERPL W P-5'-P-CCNC: NORMAL U/L
ANION GAP SERPL CALCULATED.3IONS-SCNC: 19 MMOL/L (ref 7–15)
ANION GAP SERPL CALCULATED.3IONS-SCNC: 19 MMOL/L (ref 7–15)
AST SERPL W P-5'-P-CCNC: NORMAL U/L
ATRIAL RATE - MUSE: 127 BPM
BASE EXCESS BLDV CALC-SCNC: 1 MMOL/L (ref -3–3)
BASOPHILS # BLD AUTO: 0.1 10E3/UL (ref 0–0.2)
BASOPHILS NFR BLD AUTO: 0 %
BILIRUB DIRECT SERPL-MCNC: NORMAL MG/DL
BILIRUB SERPL-MCNC: 0.5 MG/DL
BUN SERPL-MCNC: 16.2 MG/DL (ref 8–23)
BUN SERPL-MCNC: 16.4 MG/DL (ref 8–23)
CALCIUM SERPL-MCNC: 10 MG/DL (ref 8.8–10.4)
CALCIUM SERPL-MCNC: 9.9 MG/DL (ref 8.8–10.4)
CHLORIDE SERPL-SCNC: 79 MMOL/L (ref 98–107)
CHLORIDE SERPL-SCNC: 81 MMOL/L (ref 98–107)
CREAT SERPL-MCNC: 0.57 MG/DL (ref 0.51–0.95)
CREAT SERPL-MCNC: 0.66 MG/DL (ref 0.51–0.95)
D DIMER PPP FEU-MCNC: 1.67 UG/ML FEU (ref 0–0.5)
DIASTOLIC BLOOD PRESSURE - MUSE: NORMAL MMHG
EGFRCR SERPLBLD CKD-EPI 2021: >90 ML/MIN/1.73M2
EGFRCR SERPLBLD CKD-EPI 2021: >90 ML/MIN/1.73M2
EOSINOPHIL # BLD AUTO: 0 10E3/UL (ref 0–0.7)
EOSINOPHIL NFR BLD AUTO: 0 %
ERYTHROCYTE [DISTWIDTH] IN BLOOD BY AUTOMATED COUNT: 12.6 % (ref 10–15)
FLUAV RNA SPEC QL NAA+PROBE: POSITIVE
FLUBV RNA RESP QL NAA+PROBE: NEGATIVE
GLUCOSE SERPL-MCNC: 91 MG/DL (ref 70–99)
GLUCOSE SERPL-MCNC: 92 MG/DL (ref 70–99)
HCO3 BLDV-SCNC: 27 MMOL/L (ref 21–28)
HCO3 SERPL-SCNC: 20 MMOL/L (ref 22–29)
HCO3 SERPL-SCNC: 21 MMOL/L (ref 22–29)
HCT VFR BLD AUTO: 36.2 % (ref 35–47)
HGB BLD-MCNC: 12.7 G/DL (ref 11.7–15.7)
IMM GRANULOCYTES # BLD: 0.2 10E3/UL
IMM GRANULOCYTES NFR BLD: 1 %
INR PPP: 0.91 (ref 0.85–1.15)
INTERPRETATION ECG - MUSE: NORMAL
LACTATE SERPL-SCNC: 1.3 MMOL/L (ref 0.7–2)
LYMPHOCYTES # BLD AUTO: 2.2 10E3/UL (ref 0.8–5.3)
LYMPHOCYTES NFR BLD AUTO: 8 %
MAGNESIUM SERPL-MCNC: 1.2 MG/DL (ref 1.7–2.3)
MCH RBC QN AUTO: 26.2 PG (ref 26.5–33)
MCHC RBC AUTO-ENTMCNC: 35.1 G/DL (ref 31.5–36.5)
MCV RBC AUTO: 75 FL (ref 78–100)
MONOCYTES # BLD AUTO: 1.3 10E3/UL (ref 0–1.3)
MONOCYTES NFR BLD AUTO: 5 %
NEUTROPHILS # BLD AUTO: 23.7 10E3/UL (ref 1.6–8.3)
NEUTROPHILS NFR BLD AUTO: 86 %
NRBC # BLD AUTO: 0 10E3/UL
NRBC BLD AUTO-RTO: 0 /100
NT-PROBNP SERPL-MCNC: 83 PG/ML (ref 0–900)
O2/TOTAL GAS SETTING VFR VENT: 30 %
OXYHGB MFR BLDV: 69 % (ref 70–75)
P AXIS - MUSE: 66 DEGREES
PCO2 BLDV: 44 MM HG (ref 40–50)
PH BLDV: 7.39 [PH] (ref 7.32–7.43)
PLATELET # BLD AUTO: 326 10E3/UL (ref 150–450)
PO2 BLDV: 39 MM HG (ref 25–47)
POTASSIUM SERPL-SCNC: 4.5 MMOL/L (ref 3.4–5.3)
POTASSIUM SERPL-SCNC: 8.2 MMOL/L (ref 3.4–5.3)
PR INTERVAL - MUSE: 148 MS
PROCALCITONIN SERPL IA-MCNC: 0.18 NG/ML
PROT SERPL-MCNC: 8.2 G/DL (ref 6.4–8.3)
QRS DURATION - MUSE: 88 MS
QT - MUSE: 302 MS
QTC - MUSE: 438 MS
R AXIS - MUSE: -47 DEGREES
RBC # BLD AUTO: 4.84 10E6/UL (ref 3.8–5.2)
RSV RNA SPEC NAA+PROBE: NEGATIVE
SAO2 % BLDV: 69.4 % (ref 70–75)
SARS-COV-2 RNA RESP QL NAA+PROBE: NEGATIVE
SODIUM SERPL-SCNC: 118 MMOL/L (ref 135–145)
SODIUM SERPL-SCNC: 121 MMOL/L (ref 135–145)
SYSTOLIC BLOOD PRESSURE - MUSE: NORMAL MMHG
T AXIS - MUSE: 73 DEGREES
TROPONIN T SERPL HS-MCNC: 21 NG/L
VENTRICULAR RATE- MUSE: 127 BPM
WBC # BLD AUTO: 27.5 10E3/UL (ref 4–11)

## 2025-01-11 PROCEDURE — 99291 CRITICAL CARE FIRST HOUR: CPT | Mod: 25

## 2025-01-11 PROCEDURE — 84145 PROCALCITONIN (PCT): CPT | Performed by: EMERGENCY MEDICINE

## 2025-01-11 PROCEDURE — 96367 TX/PROPH/DG ADDL SEQ IV INF: CPT

## 2025-01-11 PROCEDURE — 83880 ASSAY OF NATRIURETIC PEPTIDE: CPT | Performed by: EMERGENCY MEDICINE

## 2025-01-11 PROCEDURE — 85014 HEMATOCRIT: CPT | Performed by: EMERGENCY MEDICINE

## 2025-01-11 PROCEDURE — 84484 ASSAY OF TROPONIN QUANT: CPT | Performed by: EMERGENCY MEDICINE

## 2025-01-11 PROCEDURE — 250N000013 HC RX MED GY IP 250 OP 250 PS 637: Performed by: EMERGENCY MEDICINE

## 2025-01-11 PROCEDURE — 36415 COLL VENOUS BLD VENIPUNCTURE: CPT | Performed by: EMERGENCY MEDICINE

## 2025-01-11 PROCEDURE — 80048 BASIC METABOLIC PNL TOTAL CA: CPT | Performed by: EMERGENCY MEDICINE

## 2025-01-11 PROCEDURE — 94640 AIRWAY INHALATION TREATMENT: CPT

## 2025-01-11 PROCEDURE — 82805 BLOOD GASES W/O2 SATURATION: CPT | Performed by: EMERGENCY MEDICINE

## 2025-01-11 PROCEDURE — 250N000011 HC RX IP 250 OP 636: Performed by: EMERGENCY MEDICINE

## 2025-01-11 PROCEDURE — 71275 CT ANGIOGRAPHY CHEST: CPT

## 2025-01-11 PROCEDURE — 87637 SARSCOV2&INF A&B&RSV AMP PRB: CPT | Performed by: EMERGENCY MEDICINE

## 2025-01-11 PROCEDURE — 85004 AUTOMATED DIFF WBC COUNT: CPT | Performed by: EMERGENCY MEDICINE

## 2025-01-11 PROCEDURE — 85379 FIBRIN DEGRADATION QUANT: CPT | Performed by: EMERGENCY MEDICINE

## 2025-01-11 PROCEDURE — 96365 THER/PROPH/DIAG IV INF INIT: CPT | Mod: 59

## 2025-01-11 PROCEDURE — 87040 BLOOD CULTURE FOR BACTERIA: CPT | Performed by: EMERGENCY MEDICINE

## 2025-01-11 PROCEDURE — 83605 ASSAY OF LACTIC ACID: CPT | Performed by: EMERGENCY MEDICINE

## 2025-01-11 PROCEDURE — 93005 ELECTROCARDIOGRAM TRACING: CPT | Performed by: EMERGENCY MEDICINE

## 2025-01-11 PROCEDURE — 999N000157 HC STATISTIC RCP TIME EA 10 MIN

## 2025-01-11 PROCEDURE — 82040 ASSAY OF SERUM ALBUMIN: CPT | Performed by: EMERGENCY MEDICINE

## 2025-01-11 PROCEDURE — 83735 ASSAY OF MAGNESIUM: CPT | Performed by: EMERGENCY MEDICINE

## 2025-01-11 PROCEDURE — 85610 PROTHROMBIN TIME: CPT | Performed by: EMERGENCY MEDICINE

## 2025-01-11 PROCEDURE — 84155 ASSAY OF PROTEIN SERUM: CPT | Performed by: EMERGENCY MEDICINE

## 2025-01-11 PROCEDURE — 250N000009 HC RX 250: Performed by: EMERGENCY MEDICINE

## 2025-01-11 PROCEDURE — 258N000003 HC RX IP 258 OP 636: Performed by: EMERGENCY MEDICINE

## 2025-01-11 RX ORDER — OSELTAMIVIR PHOSPHATE 30 MG/1
30 CAPSULE ORAL ONCE
Status: COMPLETED | OUTPATIENT
Start: 2025-01-11 | End: 2025-01-11

## 2025-01-11 RX ORDER — METHYLPREDNISOLONE SODIUM SUCCINATE 125 MG/2ML
125 INJECTION INTRAMUSCULAR; INTRAVENOUS ONCE
Status: COMPLETED | OUTPATIENT
Start: 2025-01-12 | End: 2025-01-12

## 2025-01-11 RX ORDER — IOPAMIDOL 755 MG/ML
75 INJECTION, SOLUTION INTRAVASCULAR ONCE
Status: COMPLETED | OUTPATIENT
Start: 2025-01-11 | End: 2025-01-11

## 2025-01-11 RX ORDER — AZITHROMYCIN 500 MG/5ML
500 INJECTION, POWDER, LYOPHILIZED, FOR SOLUTION INTRAVENOUS ONCE
Status: COMPLETED | OUTPATIENT
Start: 2025-01-11 | End: 2025-01-12

## 2025-01-11 RX ORDER — IPRATROPIUM BROMIDE AND ALBUTEROL SULFATE 2.5; .5 MG/3ML; MG/3ML
3 SOLUTION RESPIRATORY (INHALATION) ONCE
Status: COMPLETED | OUTPATIENT
Start: 2025-01-11 | End: 2025-01-11

## 2025-01-11 RX ORDER — PIPERACILLIN SODIUM, TAZOBACTAM SODIUM 3; .375 G/15ML; G/15ML
3.38 INJECTION, POWDER, LYOPHILIZED, FOR SOLUTION INTRAVENOUS ONCE
Status: COMPLETED | OUTPATIENT
Start: 2025-01-11 | End: 2025-01-11

## 2025-01-11 RX ORDER — MAGNESIUM SULFATE HEPTAHYDRATE 40 MG/ML
2 INJECTION, SOLUTION INTRAVENOUS ONCE
Status: COMPLETED | OUTPATIENT
Start: 2025-01-11 | End: 2025-01-11

## 2025-01-11 RX ADMIN — IOPAMIDOL 75 ML: 755 INJECTION, SOLUTION INTRAVENOUS at 22:45

## 2025-01-11 RX ADMIN — MAGNESIUM SULFATE HEPTAHYDRATE 2 G: 40 INJECTION, SOLUTION INTRAVENOUS at 22:02

## 2025-01-11 RX ADMIN — SODIUM CHLORIDE 1000 ML: 9 INJECTION, SOLUTION INTRAVENOUS at 21:57

## 2025-01-11 RX ADMIN — OSELTAMIVIR PHOSPHATE 30 MG: 30 CAPSULE ORAL at 22:36

## 2025-01-11 RX ADMIN — PIPERACILLIN AND TAZOBACTAM 3.38 G: 3; .375 INJECTION, POWDER, FOR SOLUTION INTRAVENOUS at 22:01

## 2025-01-11 RX ADMIN — AZITHROMYCIN MONOHYDRATE 500 MG: 500 INJECTION, POWDER, LYOPHILIZED, FOR SOLUTION INTRAVENOUS at 23:32

## 2025-01-11 RX ADMIN — IPRATROPIUM BROMIDE AND ALBUTEROL SULFATE 3 ML: .5; 3 SOLUTION RESPIRATORY (INHALATION) at 23:08

## 2025-01-11 ASSESSMENT — COLUMBIA-SUICIDE SEVERITY RATING SCALE - C-SSRS
2. HAVE YOU ACTUALLY HAD ANY THOUGHTS OF KILLING YOURSELF IN THE PAST MONTH?: NO
1. IN THE PAST MONTH, HAVE YOU WISHED YOU WERE DEAD OR WISHED YOU COULD GO TO SLEEP AND NOT WAKE UP?: NO
6. HAVE YOU EVER DONE ANYTHING, STARTED TO DO ANYTHING, OR PREPARED TO DO ANYTHING TO END YOUR LIFE?: NO

## 2025-01-11 ASSESSMENT — ACTIVITIES OF DAILY LIVING (ADL)
ADLS_ACUITY_SCORE: 52

## 2025-01-12 PROBLEM — A41.9 ACUTE SEPSIS (H): Status: ACTIVE | Noted: 2025-01-12

## 2025-01-12 PROBLEM — E83.42 HYPOMAGNESEMIA: Status: ACTIVE | Noted: 2024-12-03

## 2025-01-12 PROBLEM — E87.1 HYPONATREMIA: Status: ACTIVE | Noted: 2025-01-12

## 2025-01-12 PROBLEM — J10.1 INFLUENZA A: Status: ACTIVE | Noted: 2025-01-12

## 2025-01-12 PROBLEM — J98.01 ACUTE BRONCHOSPASM: Status: ACTIVE | Noted: 2025-01-12

## 2025-01-12 PROBLEM — Z22.7 LATENT TUBERCULOSIS: Status: ACTIVE | Noted: 2025-01-12

## 2025-01-12 PROBLEM — R09.02 HYPOXEMIA: Status: ACTIVE | Noted: 2025-01-12

## 2025-01-12 LAB
ANION GAP SERPL CALCULATED.3IONS-SCNC: 17 MMOL/L (ref 7–15)
ANION GAP SERPL CALCULATED.3IONS-SCNC: 18 MMOL/L (ref 7–15)
BUN SERPL-MCNC: 14.2 MG/DL (ref 8–23)
BUN SERPL-MCNC: 15.8 MG/DL (ref 8–23)
CALCIUM SERPL-MCNC: 9.6 MG/DL (ref 8.8–10.4)
CALCIUM SERPL-MCNC: 9.9 MG/DL (ref 8.8–10.4)
CHLORIDE SERPL-SCNC: 87 MMOL/L (ref 98–107)
CHLORIDE SERPL-SCNC: 88 MMOL/L (ref 98–107)
CREAT SERPL-MCNC: 0.63 MG/DL (ref 0.51–0.95)
CREAT SERPL-MCNC: 0.64 MG/DL (ref 0.51–0.95)
EGFRCR SERPLBLD CKD-EPI 2021: >90 ML/MIN/1.73M2
EGFRCR SERPLBLD CKD-EPI 2021: >90 ML/MIN/1.73M2
ERYTHROCYTE [DISTWIDTH] IN BLOOD BY AUTOMATED COUNT: 12.5 % (ref 10–15)
GLUCOSE SERPL-MCNC: 134 MG/DL (ref 70–99)
GLUCOSE SERPL-MCNC: 188 MG/DL (ref 70–99)
HCO3 SERPL-SCNC: 21 MMOL/L (ref 22–29)
HCO3 SERPL-SCNC: 21 MMOL/L (ref 22–29)
HCT VFR BLD AUTO: 31.8 % (ref 35–47)
HGB BLD-MCNC: 11 G/DL (ref 11.7–15.7)
MAGNESIUM SERPL-MCNC: 1.8 MG/DL (ref 1.7–2.3)
MCH RBC QN AUTO: 26.2 PG (ref 26.5–33)
MCHC RBC AUTO-ENTMCNC: 34.6 G/DL (ref 31.5–36.5)
MCV RBC AUTO: 76 FL (ref 78–100)
OSMOLALITY SERPL: 267 MMOL/KG (ref 280–301)
PLATELET # BLD AUTO: 277 10E3/UL (ref 150–450)
POTASSIUM SERPL-SCNC: 4 MMOL/L (ref 3.4–5.3)
POTASSIUM SERPL-SCNC: 4.5 MMOL/L (ref 3.4–5.3)
RBC # BLD AUTO: 4.2 10E6/UL (ref 3.8–5.2)
SODIUM SERPL-SCNC: 125 MMOL/L (ref 135–145)
SODIUM SERPL-SCNC: 127 MMOL/L (ref 135–145)
TROPONIN T SERPL HS-MCNC: 22 NG/L
WBC # BLD AUTO: 23.6 10E3/UL (ref 4–11)

## 2025-01-12 PROCEDURE — 83735 ASSAY OF MAGNESIUM: CPT | Performed by: STUDENT IN AN ORGANIZED HEALTH CARE EDUCATION/TRAINING PROGRAM

## 2025-01-12 PROCEDURE — 94640 AIRWAY INHALATION TREATMENT: CPT

## 2025-01-12 PROCEDURE — 250N000013 HC RX MED GY IP 250 OP 250 PS 637: Performed by: STUDENT IN AN ORGANIZED HEALTH CARE EDUCATION/TRAINING PROGRAM

## 2025-01-12 PROCEDURE — 999N000157 HC STATISTIC RCP TIME EA 10 MIN

## 2025-01-12 PROCEDURE — 83930 ASSAY OF BLOOD OSMOLALITY: CPT

## 2025-01-12 PROCEDURE — 80048 BASIC METABOLIC PNL TOTAL CA: CPT

## 2025-01-12 PROCEDURE — 94640 AIRWAY INHALATION TREATMENT: CPT | Mod: 76

## 2025-01-12 PROCEDURE — 36415 COLL VENOUS BLD VENIPUNCTURE: CPT

## 2025-01-12 PROCEDURE — 82310 ASSAY OF CALCIUM: CPT

## 2025-01-12 PROCEDURE — 85048 AUTOMATED LEUKOCYTE COUNT: CPT

## 2025-01-12 PROCEDURE — 250N000009 HC RX 250

## 2025-01-12 PROCEDURE — 84484 ASSAY OF TROPONIN QUANT: CPT | Performed by: EMERGENCY MEDICINE

## 2025-01-12 PROCEDURE — 85018 HEMOGLOBIN: CPT

## 2025-01-12 PROCEDURE — 250N000011 HC RX IP 250 OP 636: Performed by: EMERGENCY MEDICINE

## 2025-01-12 PROCEDURE — 36415 COLL VENOUS BLD VENIPUNCTURE: CPT | Performed by: EMERGENCY MEDICINE

## 2025-01-12 PROCEDURE — 250N000012 HC RX MED GY IP 250 OP 636 PS 637

## 2025-01-12 PROCEDURE — 250N000013 HC RX MED GY IP 250 OP 250 PS 637

## 2025-01-12 PROCEDURE — 120N000001 HC R&B MED SURG/OB

## 2025-01-12 RX ORDER — ONDANSETRON 4 MG/1
4 TABLET, ORALLY DISINTEGRATING ORAL EVERY 6 HOURS PRN
Status: DISCONTINUED | OUTPATIENT
Start: 2025-01-12 | End: 2025-01-15 | Stop reason: HOSPADM

## 2025-01-12 RX ORDER — AMOXICILLIN 250 MG
2 CAPSULE ORAL 2 TIMES DAILY PRN
Status: DISCONTINUED | OUTPATIENT
Start: 2025-01-12 | End: 2025-01-15 | Stop reason: HOSPADM

## 2025-01-12 RX ORDER — PROCHLORPERAZINE MALEATE 5 MG/1
5 TABLET ORAL EVERY 6 HOURS PRN
Status: DISCONTINUED | OUTPATIENT
Start: 2025-01-12 | End: 2025-01-15 | Stop reason: HOSPADM

## 2025-01-12 RX ORDER — ACETAMINOPHEN 650 MG/1
650 SUPPOSITORY RECTAL EVERY 4 HOURS PRN
Status: DISCONTINUED | OUTPATIENT
Start: 2025-01-12 | End: 2025-01-15 | Stop reason: HOSPADM

## 2025-01-12 RX ORDER — AMOXICILLIN 250 MG
1 CAPSULE ORAL 2 TIMES DAILY PRN
Status: DISCONTINUED | OUTPATIENT
Start: 2025-01-12 | End: 2025-01-15 | Stop reason: HOSPADM

## 2025-01-12 RX ORDER — FAMOTIDINE 20 MG/1
20 TABLET, FILM COATED ORAL
Status: DISCONTINUED | OUTPATIENT
Start: 2025-01-12 | End: 2025-01-14

## 2025-01-12 RX ORDER — PREDNISONE 20 MG/1
40 TABLET ORAL DAILY
Status: DISCONTINUED | OUTPATIENT
Start: 2025-01-12 | End: 2025-01-15 | Stop reason: HOSPADM

## 2025-01-12 RX ORDER — FAMOTIDINE 20 MG/1
40 TABLET, FILM COATED ORAL
Status: DISCONTINUED | OUTPATIENT
Start: 2025-01-12 | End: 2025-01-12

## 2025-01-12 RX ORDER — ATORVASTATIN CALCIUM 10 MG/1
20 TABLET, FILM COATED ORAL DAILY
Status: DISCONTINUED | OUTPATIENT
Start: 2025-01-12 | End: 2025-01-15 | Stop reason: HOSPADM

## 2025-01-12 RX ORDER — KETOTIFEN FUMARATE 0.35 MG/ML
1 SOLUTION/ DROPS OPHTHALMIC 2 TIMES DAILY PRN
Status: DISCONTINUED | OUTPATIENT
Start: 2025-01-12 | End: 2025-01-15 | Stop reason: HOSPADM

## 2025-01-12 RX ORDER — LIDOCAINE 40 MG/G
CREAM TOPICAL
Status: DISCONTINUED | OUTPATIENT
Start: 2025-01-12 | End: 2025-01-15 | Stop reason: HOSPADM

## 2025-01-12 RX ORDER — LISINOPRIL AND HYDROCHLOROTHIAZIDE 12.5; 2 MG/1; MG/1
1 TABLET ORAL DAILY
Status: DISCONTINUED | OUTPATIENT
Start: 2025-01-12 | End: 2025-01-14

## 2025-01-12 RX ORDER — CALCIUM CARBONATE 500 MG/1
1000 TABLET, CHEWABLE ORAL 4 TIMES DAILY PRN
Status: DISCONTINUED | OUTPATIENT
Start: 2025-01-12 | End: 2025-01-15 | Stop reason: HOSPADM

## 2025-01-12 RX ORDER — GABAPENTIN 300 MG/1
300 CAPSULE ORAL DAILY PRN
Status: DISCONTINUED | OUTPATIENT
Start: 2025-01-12 | End: 2025-01-15 | Stop reason: HOSPADM

## 2025-01-12 RX ORDER — RIFAMPIN 300 MG/1
600 CAPSULE ORAL DAILY
COMMUNITY

## 2025-01-12 RX ORDER — IPRATROPIUM BROMIDE AND ALBUTEROL SULFATE 2.5; .5 MG/3ML; MG/3ML
3 SOLUTION RESPIRATORY (INHALATION) 4 TIMES DAILY
Status: DISCONTINUED | OUTPATIENT
Start: 2025-01-12 | End: 2025-01-15 | Stop reason: HOSPADM

## 2025-01-12 RX ORDER — ALBUTEROL SULFATE 90 UG/1
1-2 INHALANT RESPIRATORY (INHALATION) EVERY 4 HOURS PRN
Status: DISCONTINUED | OUTPATIENT
Start: 2025-01-12 | End: 2025-01-15 | Stop reason: HOSPADM

## 2025-01-12 RX ORDER — OSELTAMIVIR PHOSPHATE 30 MG/1
30 CAPSULE ORAL 2 TIMES DAILY
Status: DISCONTINUED | OUTPATIENT
Start: 2025-01-12 | End: 2025-01-15 | Stop reason: HOSPADM

## 2025-01-12 RX ORDER — RIFAMPIN 300 MG/1
600 CAPSULE ORAL DAILY
Status: DISCONTINUED | OUTPATIENT
Start: 2025-01-12 | End: 2025-01-15 | Stop reason: HOSPADM

## 2025-01-12 RX ORDER — VITAMIN B COMPLEX
50 TABLET ORAL DAILY
Status: DISCONTINUED | OUTPATIENT
Start: 2025-01-12 | End: 2025-01-15 | Stop reason: HOSPADM

## 2025-01-12 RX ORDER — ACETAMINOPHEN 325 MG/1
650 TABLET ORAL EVERY 4 HOURS PRN
Status: DISCONTINUED | OUTPATIENT
Start: 2025-01-12 | End: 2025-01-15 | Stop reason: HOSPADM

## 2025-01-12 RX ORDER — ONDANSETRON 2 MG/ML
4 INJECTION INTRAMUSCULAR; INTRAVENOUS EVERY 6 HOURS PRN
Status: DISCONTINUED | OUTPATIENT
Start: 2025-01-12 | End: 2025-01-15 | Stop reason: HOSPADM

## 2025-01-12 RX ADMIN — IPRATROPIUM BROMIDE AND ALBUTEROL SULFATE 3 ML: .5; 3 SOLUTION RESPIRATORY (INHALATION) at 15:01

## 2025-01-12 RX ADMIN — Medication 50 MCG: at 17:42

## 2025-01-12 RX ADMIN — IPRATROPIUM BROMIDE AND ALBUTEROL SULFATE 3 ML: .5; 3 SOLUTION RESPIRATORY (INHALATION) at 20:38

## 2025-01-12 RX ADMIN — GABAPENTIN 300 MG: 300 CAPSULE ORAL at 22:20

## 2025-01-12 RX ADMIN — IPRATROPIUM BROMIDE AND ALBUTEROL SULFATE 3 ML: .5; 3 SOLUTION RESPIRATORY (INHALATION) at 07:43

## 2025-01-12 RX ADMIN — OSELTAMIVIR PHOSPHATE 30 MG: 30 CAPSULE ORAL at 22:14

## 2025-01-12 RX ADMIN — METHYLPREDNISOLONE SODIUM SUCCINATE 125 MG: 125 INJECTION, POWDER, FOR SOLUTION INTRAMUSCULAR; INTRAVENOUS at 01:31

## 2025-01-12 RX ADMIN — ATORVASTATIN CALCIUM 20 MG: 10 TABLET, FILM COATED ORAL at 17:42

## 2025-01-12 RX ADMIN — IPRATROPIUM BROMIDE AND ALBUTEROL SULFATE 3 ML: .5; 3 SOLUTION RESPIRATORY (INHALATION) at 11:00

## 2025-01-12 RX ADMIN — PREDNISONE 40 MG: 20 TABLET ORAL at 09:31

## 2025-01-12 RX ADMIN — RIFAMPIN 600 MG: 300 CAPSULE ORAL at 17:43

## 2025-01-12 RX ADMIN — FAMOTIDINE 20 MG: 20 TABLET ORAL at 22:20

## 2025-01-12 RX ADMIN — OSELTAMIVIR PHOSPHATE 30 MG: 30 CAPSULE ORAL at 09:31

## 2025-01-12 ASSESSMENT — ACTIVITIES OF DAILY LIVING (ADL)
ADLS_ACUITY_SCORE: 54
ADLS_ACUITY_SCORE: 28
ADLS_ACUITY_SCORE: 54
ADLS_ACUITY_SCORE: 52
ADLS_ACUITY_SCORE: 52
ADLS_ACUITY_SCORE: 54
ADLS_ACUITY_SCORE: 52
ADLS_ACUITY_SCORE: 54
ADLS_ACUITY_SCORE: 54
ADLS_ACUITY_SCORE: 52
ADLS_ACUITY_SCORE: 54
ADLS_ACUITY_SCORE: 54

## 2025-01-12 NOTE — PROGRESS NOTES
[facility]    Progress Note - Hospitalist Service       Date of Admission:  1/11/2025    Assessment & Plan   Katja Long is a 74 year old female admitted on 1/11/2025. She has ahistory of  HTN, latent TB on rifampin, pulmonary scarring, GERD,  and is admitted for hypoxemia likely secondary to influenza A and asthma vs COPD exacerbation, and hyponatremia.    Influenza A  Hypoxemia  Asthma vs COPD exacerbation  Patient denies formal diagnosis of COPD, emphysema, or asthma.  However, PCP has prescribed Ventolin inhaler which patient uses infrequently.  There could be some aspect of asthma exacerbation/COPD exacerbation underlying this influenza A infection.  Will treat influenza as well as presumed asthma/ COPD exacerbation given emphysematous changes on imaging.  Supplemental O2, wean as tolerated  Tamiflu X 5 days  Prednisone X 5 days  DuoNebs scheduled  Tylenol prn  PT/OT consulted, appreciate recommendations for cares     Acute on Chronic Hyponatremia  Hypomagnesemia, improved  Initial Na 121, Mg 1.2. 1/12 AM improved to Na 127, Mg 1.8  Has been hyponatremic in the past with baseline for last few months appearing to be in low 130s. Likely acute on chronic with concern for too quick of correction   Repeat BMP, if sodium continues to increase, reverse with D5W  Magnesium replacement protocol  AM BMP, Mg  Pending Urine osm, Na, Creatinine, blood osm    Elevated troponin, stable   Troponin elevated to 21, delta Trop 22. EKG in emergency department with sinus tachycardia and left anterior fascicular block. Patient denies any chest pain, shortness of breath only after coughing.  No further workup indicated at this time.     Latent TB  Patient reported to begin rifampin on Monday, took for 2 days before discontinuing.  Reportedly developed red-tinged sputum in the setting of influenza above.  Discussed that rifampin can turn bodily secretions orange/red. Imaging showing 1.2 x1.5 x1.9 cm  nodularity in left apex which per family was present previously when patient was undergoing workup with [facility] for TB. Determined no active TB.  Continue PTA Rifampin    Imaging Finding  Ascending thoracic aortic aneurysm measuring 4.6 x 4.7 cm. Family aware of finding.  - Radiology recommending Cardiothoracic Surgery consult for further evaluation     Chronic conditions:  HTN: PTA Lisinopril-hydrochlorothiazide  HLD: PTA atorvastatin  GERD: PTA Famotidine        Diet: Combination Diet Regular Diet Adult    DVT Prophylaxis: Pneumatic Compression Devices  Mendoza Catheter: Not present  Fluids: PO  Lines: None     Cardiac Monitoring: ACTIVE order. Indication: Chest pain/ ACS rule out (24 hours)  Code Status: No CPR- Do NOT Intubate      Clinically Significant Risk Factors Present on Admission        # Hyperkalemia: Highest K = 8.2 mmol/L in last 2 days, will monitor as appropriate  # Hyponatremia: Lowest Na = 118 mmol/L in last 2 days, will monitor as appropriate  # Hypochloremia: Lowest Cl = 79 mmol/L in last 2 days, will monitor as appropriate    # Hypomagnesemia: Lowest Mg = 1.2 mg/dL in last 2 days, will replace as needed  # Anion Gap Metabolic Acidosis: Highest Anion Gap = 19 mmol/L in last 2 days, will monitor and treat as appropriate      # Hypertension: Noted on problem list           # Cachexia: Estimated body mass index is 15.82 kg/m  as calculated from the following:    Height as of this encounter: 1.524 m (5').    Weight as of this encounter: 36.7 kg (81 lb).              Social Drivers of Health   Depression: At risk (3/4/2024)    PHQ-2     PHQ-2 Score: 6         Disposition Plan     Medically Ready for Discharge: Anticipated Tomorrow         The patient's care was discussed with the Attending Physician, Dr. Guerrero .    Peyton Diaz MD  Hospitalist Service  [facility]  Securely message with [employer] (more info)  Text page via [employer] Paging/Directory    ______________________________________________________________________    Interval History   Daughter and son-in-law at bedside. Patient reports body aches, coughing and shortness of breath only after coughing fits. Denies any pain including chest pain, family is aware of imaging results including pulmonary nodule that was consistent with previous imaging (per family) but sputum samples were negative for active TB hence the patient's treatment for latent TB. Patient and family understanding of the side effect of rifampin turning body fluids red-tinged. Turned off O2 while speaking with patient and patient was saturating ~93-95% on room air.     Physical Exam   Vital Signs: Temp: 98.4  F (36.9  C) Temp src: Oral BP: (!) 147/70 Pulse: 78   Resp: 27 SpO2: 100 % O2 Device: Nasal cannula Oxygen Delivery: 2 LPM  Weight: 81 lbs 0 oz    PHYSICAL EXAM:  GENERAL: Awake, alert, No acute distress.   HEENT: No scleral icterus or conjunctival injection. Oral cavity moist and pink with no ulcers, exudate, or thrush present.   SKIN: Warm and dry.   LUNGS: Normal work of breathing with no use of accessory muscles. Inspiratory wheezing in anterior lung fields, intermittent scattered expiratory wheeze in posterior bases.   CARDIAC: RRR. Normal S1 and S2. No murmurs, clicks, or rubs appreciated. No peripheral edema.  ABDOMEN: Non-distended. Soft and non-tender throughout with no masses or organomegaly.  NEUROLOGIC: Alert and oriented.   EXTREMITIES: No gross deformity or peripheral edema. Appear well-perfused.       Data     I have personally reviewed the following data over the past 24 hrs:    23.6 (H)  \   11.0 (L)   / 277     127 (L) 88 (L) 14.2 /  134 (H)   4.5 21 (L) 0.64 \     ALT: N/A AST: N/A AP: N/A TBILI: 0.5   ALB: 3.6 TOT PROTEIN: 8.2 LIPASE: N/A     Trop: 22 (H) BNP: 83     Procal: 0.18 CRP: N/A Lactic Acid: 1.3       INR:  0.91 PTT:  N/A   D-dimer:  1.67 (H) Fibrinogen:  N/A       Imaging results reviewed over the past  24 hrs:   Recent Results (from the past 24 hours)   CT Chest Pulmonary Embolism w Contrast    Narrative    EXAM: CT CHEST PULMONARY EMBOLISM W CONTRAST  LOCATION: [facility]  DATE: 1/11/2025    INDICATION: hypoxemia with hemoptysis  COMPARISON: CT cervical spine 10/4/2024  TECHNIQUE: CT chest pulmonary angiogram during arterial phase injection of IV contrast. Multiplanar reformats and MIP reconstructions were performed. Dose reduction techniques were used.   CONTRAST: 90 cc Isovue 370    FINDINGS:  ANGIOGRAM CHEST: No central or central subsegmental pulmonary embolism. The mid and distal pulmonary arteries are not well opacified. Thoracic aorta is not well opacified and is  indeterminate for dissection. No CT evidence of right heart strain.    LUNGS AND PLEURA: Mild to moderate emphysematous changes throughout the lungs. 1.3 x 1.5 x 1.9 cm nodularity in the left apex appears similar to prior examination 10/4/2024. There is associated pleural parenchymal thickening in this region. This may   represent scarring based on stability however mass is not excluded. Biapical pleural-parenchymal scarring. Moderate bronchiectasis changes with bronchial thickening in the mid and lower lungs and scattered areas of peribronchial nodularity or nodular   infiltrates likely secondary to inspissated secretions in the bronchi. No pleural effusions. No consolidative infiltrates. Calcified granulomas in the left lung.    MEDIASTINUM/AXILLAE: Ascending thoracic aortic aneurysm measuring approximately 4.6 x 4.7 cm. No evidence to suggest dissection. No adenopathy. No pericardial effusion.    CORONARY ARTERY CALCIFICATION: Moderate.    UPPER ABDOMEN: Cholecystectomy changes. No concerning upper abdominal findings.    MUSCULOSKELETAL: Mild spondylosis.      Impression    IMPRESSION:  1.  No evidence of central or central subsegmental pulmonary embolism. The mid and distal pulmonary arteries are not well  opacified.  2.  Mild to moderate emphysematous changes throughout the lungs.  3.  1.3 x 1.5 x 1.9 cm nodularity in the left apex with associated pleural-parenchymal thickening. This may represent scarring however mass is not excluded. Please see below recommendation.  4.  Moderate bronchiectasis changes with bronchial thickening and scattered areas of peribronchial nodularity likely secondary to inspissated secretions in the bronchi.  5.  Ascending thoracic aortic aneurysm measuring 4.6 x 4.7 cm. Recommend Cardiothoracic Surgery consult for further evaluation.      Pulmonary nodules are a common incidental finding on CT. If there is prior imaging such as a chest radiograph or CT available at another facility, this should be compared to assess for stability.    Regardless of risk factors, referral to a pulmonologist is recommended to discuss further evaluation which may include tissue biopsy, PET-CT, or follow up imaging at 3, 9 and 24 months.    Adapted from Consensus Recommendations, Radiology 2005;237:395-400.        There is aneurysmal dilation of the ascending aorta to 4.6 x 4.7 cm. Recommend Cardiothoracic Surgery consult for further evaluation.    JACR 2018;15:8980-8207 (PMID [id number]), J Cardiothorac Surg 2015 (PMID [id number]), Circulation 2010 (PMID 81942020).

## 2025-01-12 NOTE — PLAN OF CARE
Problem: Gas Exchange Impaired  Goal: Optimal Gas Exchange  Outcome: Progressing     Goal Outcome Evaluation:  BP (!) 147/70 (BP Location: Right arm, Patient Position: Semi-Gabriel's, Cuff Size: Adult Small)   Pulse 78   Temp 98.4  F (36.9  C) (Oral)   Resp 27   Ht 1.524 m (5')   Wt 36.7 kg (81 lb)   LMP  (LMP Unknown)   SpO2 100%   BMI 15.82 kg/m    Pt is a/ox4, denied pain, dizziness, and n/v. Pt reported SOB at rest, pt is on 2L O2 via NC. Pt is able to make needs known.  Isolation precaution maintained.

## 2025-01-12 NOTE — PHARMACY-ADMISSION MEDICATION HISTORY
Pharmacist Admission Medication History    Admission medication history is complete. The information provided in this note is only as accurate as the sources available at the time of the update.    Medication reconciliation/reorder completed by provider prior to medication history? No    Information Source(s): Family member and CareEverywhere/SureScripts via phone - spoke with daughter Radha ([phone])    Pertinent Information: Patient started rifampin on Monday 1/6/25, but only took 2 doses before stopping due to side effect causing red sputum.  Rifampin was filled by [facility] per Nou.    Changes made to PTA medication list:  Added: Rifampin (dose read off bottle by Nou)  Deleted: None  Changed: ketoprofen eye drops from scheduled to PRN - has not had in a while.    Allergies reviewed with patient and updates made in EHR: yes    Medication History Completed By: Stephanie Alexandra, PharmD, BCPS, DPLA 1/12/2025 8:32 AM    Prior to Admission medications    Medication Sig Last Dose Taking? Auth Provider Long Term End Date   atorvastatin (LIPITOR) 20 MG tablet Take 1 tablet (20 mg) by mouth daily. 1/11/2025 Morning Yes Marleni Zavala APRN CNP Yes    cholecalciferol 50 MCG (2000 UT) CAPS Take 50 mcg by mouth daily. 1/11/2025 Morning Yes Unknown, Entered By History     diclofenac (VOLTAREN) 1 % topical gel Apply 2 g topically 4 times daily as needed for moderate pain. More than a month Yes Marleni Zavala APRN CNP     famotidine (PEPCID) 40 MG tablet Take 1 tablet (40 mg) by mouth nightly as needed for heartburn. Past Week Yes Jim Washington MD     gabapentin (NEURONTIN) 300 MG capsule Take 300 mg by mouth daily as needed for neuropathic pain. Past Month Yes Unknown, Entered By History Yes    GOODSENSE ARTHRITIS PAIN 650 MG CR tablet Take 650 mg by mouth every 8 hours as needed for mild pain. Past Week Yes Unknown, Entered By History No    ketotifen fumarate 0.035% 0.035 %  SOLN ophthalmic solution Place 1 drop into both eyes 2 times daily as needed for itching. More than a month Yes Reported, Patient No    lisinopril-hydrochlorothiazide (ZESTORETIC) 20-12.5 MG tablet Take 1 tablet by mouth daily. 1/11/2025 Morning Yes Jim Washington MD Yes    rifampin (RIFADIN) 300 MG capsule Take 600 mg by mouth daily. 1/7/2025 Yes Unknown, Entered By History     VENTOLIN HFA 90 mcg/actuation inhaler Inhale 1-2 puffs into the lungs every 4 hours as needed for shortness of breath. More than a month Yes Provider, Historical Yes

## 2025-01-12 NOTE — H&P
[facility]    History and Physical - Hospitalist Service       Date of Admission:  1/11/2025    Assessment & Plan      Katja Long is a 74 year old female admitted on 1/11/2025. She has a history of HTN, latent TB on rifampin, pulmonary scarring, GERD,  and is admitted for SOB, cough, found to have influenza A.     Patient needs med rec    Influenza A  Hypoxemia  Patient denies formal diagnosis of COPD, emphysema, or asthma.  However, PCP has prescribed Ventolin inhaler which patient uses infrequently.  There could be some aspect of asthma exacerbation/COPD exacerbation underlying this influenza A infection.  Will treat influenza as well as presumed asthma exacerbation.  Admit to inpatient  Supplemental O2, wean as tolerated  Tamiflu X 5 days  Prednisone X 5 days  DuoNebs scheduled  Tylenol  AM CBC, BMP  PT/OT consulted, appreciate recommendations for cares    Elevated troponin  Troponin elevated to 21, delta Trop pending at this time.  EKG in emergency department with sinus tachycardia and left anterior fascicular block.  Follow troponin  Cardiac telemetry    Latent TB  Patient reported to begin rifampin on Monday, took for 2 days before discontinuing.  Reportedly developed red-tinged sputum in the setting of influenza above.  Discussed that rifampin can turn bodily secretions orange/red.  Continue PTA Rifampin pending med rec    Hyponatremia  Hypomagnesemia  Na 121, Mg 1.2.  Has been hyponatremic in the past with baseline appearing to be in low 130s.   Magnesium replacement protocol  AM BMP, Mg  Urine osm, Na, Creatinine, blood osm  Correct slowly, believe this to be chronic hyponatremia    Chronic conditions:  HTN: PTA Lisinopril-hydrochlorothiazide pending med rec  HLD: PTA atorvastatin pending med rec  GERD: PTA Famotidine pending med rec        Diet: Combination Diet Regular Diet Adult  DVT Prophylaxis: Pneumatic Compression Devices  Mendoza Catheter: Not present  Fluids: PO  Lines:  None     Cardiac Monitoring: None  Code Status: No CPR- Do NOT Intubate    Clinically Significant Risk Factors Present on Admission        # Hyperkalemia: Highest K = 8.2 mmol/L in last 2 days, will monitor as appropriate  # Hyponatremia: Lowest Na = 118 mmol/L in last 2 days, will monitor as appropriate  # Hypochloremia: Lowest Cl = 79 mmol/L in last 2 days, will monitor as appropriate    # Hypomagnesemia: Lowest Mg = 1.2 mg/dL in last 2 days, will replace as needed  # Anion Gap Metabolic Acidosis: Highest Anion Gap = 19 mmol/L in last 2 days, will monitor and treat as appropriate      # Hypertension: Noted on problem list                      Disposition Plan      Expected Discharge Date: 01/14/2025                The patient's care was discussed with the Attending Physician, Dr. Destiny Guerrero .      Derek Cueto DO  Hospitalist Service  [facility]  Securely message with [employer] (more info)  Text page via [facility] Paging/Directory   ______________________________________________________________________    Chief Complaint   SOB, cough, influenza A    History is obtained from the patient's family    History of Present Illness   Katja Long is a 74 year old female who has a history of HTN, latent TB on rifampin, GERD,  and is admitted for SOB, Cough, red tinged phlegm.     Family and patient report that she developed symptoms on Sunday with mild cough and shortness of breath.  Had increasing sputum production.  Started rifampin on Monday for latent TB.  Noted a reddish tinge to her phlegm on Tuesday evening, discontinued rifampin.  Presented to the emergency department on 1/11/2025, noted to be hypoxic to upper 80s, afebrile. Tachypnea and tachycardia noted.  Started on 2 L via nasal cannula to maintain sats.  Initial BMP was hemolyzed, repeat demonstrated hyponatremia to 121, chloride of 81, elevated anion gap of 19, magnesium 1.2.  D-dimer elevated to 1.67, CTA demonstrated no PE or AVMs, did  show mild to moderate emphysematous changes with a left apical nodule and a thoracic aortic aneurysm.  Troponin drawn elevated to 21, repeat pending.  VBG unremarkable.  CBC with leukocytosis to 27.5, mild microcytosis without anemia.  Viral swab positive for influenza A.    Patient was given magnesium, Tamiflu, DuoNeb, and Zosyn while in the emergency department.        Past Medical History    Past Medical History:   Diagnosis Date    Kidney stone        Past Surgical History   Past Surgical History:   Procedure Laterality Date    EYE SURGERY      HC REMOVAL GALLBLADDER      Description: Cholecystectomy;  Recorded: 03/14/2014;       Prior to Admission Medications   Prior to Admission Medications   Prescriptions Last Dose Informant Patient Reported? Taking?   GOODSENSE ARTHRITIS PAIN 650 MG CR tablet   Yes No   Sig: Take 650 mg by mouth every 8 hours as needed for mild pain.   VENTOLIN HFA 90 mcg/actuation inhaler   Yes No   Sig: Inhale 1-2 puffs into the lungs every 4 hours as needed for shortness of breath.   atorvastatin (LIPITOR) 20 MG tablet   No No   Sig: Take 1 tablet (20 mg) by mouth daily.   cholecalciferol 50 MCG (2000 UT) CAPS   Yes No   Sig: Take 50 mcg by mouth every evening.   diclofenac (VOLTAREN) 1 % topical gel   No No   Sig: Apply 2 g topically 4 times daily as needed for moderate pain.   famotidine (PEPCID) 40 MG tablet   No No   Sig: Take 1 tablet (40 mg) by mouth nightly as needed for heartburn.   gabapentin (NEURONTIN) 300 MG capsule   Yes No   Sig: Take 300 mg by mouth daily as needed for neuropathic pain.   Patient not taking: Reported on 12/3/2024   ketotifen fumarate 0.035% 0.035 % SOLN ophthalmic solution   Yes No   Sig: Place 1 drop into both eyes 2 times daily.   lisinopril-hydrochlorothiazide (ZESTORETIC) 20-12.5 MG tablet   No No   Sig: Take 1 tablet by mouth daily.      Facility-Administered Medications: None           Physical Exam   Vital Signs: Temp: 99.4  F (37.4  C) Temp src:  Oral BP: (!) 154/85 Pulse: 106   Resp: (!) 41 SpO2: 90 % O2 Device: (S) Nasal cannula Oxygen Delivery: (S) 2 LPM  Weight: 0 lbs 0 oz    Constitutional: awake, alert, cooperative, no apparent distress  Eyes: Lids and lashes normal, pupils equal, sclera clear, conjunctiva normal  ENT: Normocephalic, without obvious abnormality, atraumatic, external ears without lesions, oral pharynx with moist mucous membranes  Respiratory: Mild coarse breath sounds in lower lungs bilaterally, coughing intermittently, on 2L via NC maintaining sats  Cardiovascular: Regular rate and rhythm, no murmur noted  Skin: no bruising or bleeding and normal skin color, texture, turgor on exposed skin  Psych: Appropriate mood and affect      Medical Decision Making       Please see A&P for additional details of medical decision making.      Data     I have personally reviewed the following data over the past 24 hrs:    27.5 (H)  \   12.7   / 326     121 (L) 81 (L) 16.4 /  91   4.5 21 (L) 0.66 \     ALT: N/A AST: N/A AP: N/A TBILI: 0.5   ALB: 3.6 TOT PROTEIN: 8.2 LIPASE: N/A     Trop: 21 (H) BNP: 83     Procal: 0.18 CRP: N/A Lactic Acid: 1.3       INR:  0.91 PTT:  N/A   D-dimer:  1.67 (H) Fibrinogen:  N/A       Imaging results reviewed over the past 24 hrs:   Recent Results (from the past 24 hours)   CT Chest Pulmonary Embolism w Contrast    Narrative    EXAM: CT CHEST PULMONARY EMBOLISM W CONTRAST  LOCATION: [facility]  DATE: 1/11/2025    INDICATION: hypoxemia with hemoptysis  COMPARISON: CT cervical spine 10/4/2024  TECHNIQUE: CT chest pulmonary angiogram during arterial phase injection of IV contrast. Multiplanar reformats and MIP reconstructions were performed. Dose reduction techniques were used.   CONTRAST: 90 cc Isovue 370    FINDINGS:  ANGIOGRAM CHEST: No central or central subsegmental pulmonary embolism. The mid and distal pulmonary arteries are not well opacified. Thoracic aorta is not well opacified and is   indeterminate for dissection. No CT evidence of right heart strain.    LUNGS AND PLEURA: Mild to moderate emphysematous changes throughout the lungs. 1.3 x 1.5 x 1.9 cm nodularity in the left apex appears similar to prior examination 10/4/2024. There is associated pleural parenchymal thickening in this region. This may   represent scarring based on stability however mass is not excluded. Biapical pleural-parenchymal scarring. Moderate bronchiectasis changes with bronchial thickening in the mid and lower lungs and scattered areas of peribronchial nodularity or nodular   infiltrates likely secondary to inspissated secretions in the bronchi. No pleural effusions. No consolidative infiltrates. Calcified granulomas in the left lung.    MEDIASTINUM/AXILLAE: Ascending thoracic aortic aneurysm measuring approximately 4.6 x 4.7 cm. No evidence to suggest dissection. No adenopathy. No pericardial effusion.    CORONARY ARTERY CALCIFICATION: Moderate.    UPPER ABDOMEN: Cholecystectomy changes. No concerning upper abdominal findings.    MUSCULOSKELETAL: Mild spondylosis.      Impression    IMPRESSION:  1.  No evidence of central or central subsegmental pulmonary embolism. The mid and distal pulmonary arteries are not well opacified.  2.  Mild to moderate emphysematous changes throughout the lungs.  3.  1.3 x 1.5 x 1.9 cm nodularity in the left apex with associated pleural-parenchymal thickening. This may represent scarring however mass is not excluded. Please see below recommendation.  4.  Moderate bronchiectasis changes with bronchial thickening and scattered areas of peribronchial nodularity likely secondary to inspissated secretions in the bronchi.  5.  Ascending thoracic aortic aneurysm measuring 4.6 x 4.7 cm. Recommend Cardiothoracic Surgery consult for further evaluation.      Pulmonary nodules are a common incidental finding on CT. If there is prior imaging such as a chest radiograph or CT available at another facility,  this should be compared to assess for stability.    Regardless of risk factors, referral to a pulmonologist is recommended to discuss further evaluation which may include tissue biopsy, PET-CT, or follow up imaging at 3, 9 and 24 months.    Adapted from Consensus Recommendations, Radiology 2005;237:395-400.        There is aneurysmal dilation of the ascending aorta to 4.6 x 4.7 cm. Recommend Cardiothoracic Surgery consult for further evaluation.    JACR 2018;15:[id number] (PMID [id number]), J Cardiothorac Surg 2015 (PMID [id number]), Circulation 2010 (PMID [id number]).

## 2025-01-12 NOTE — ED PROVIDER NOTES
EMERGENCY DEPARTMENT ENCOUnter      NAME: Katja Long  AGE: 74 year old female  YOB: 1950  MRN: [id number]  EVALUATION DATE & TIME: 1/11/2025  8:56 PM    PCP: Vaughn Moreau    ED PROVIDER: Carol Landis MD      Chief Complaint   Patient presents with    Shortness of Breath    Cough         FINAL IMPRESSION:  1. Influenza A    2. Hypoxemia    3. Acute bronchospasm    4. Latent tuberculosis    5. Acute sepsis (H)    6. Hyponatremia    7. Hypomagnesemia    8. Aneurysm of ascending aorta without rupture          ED COURSE & MEDICAL DECISION MAKING:      In summary, the patient is a 74-year-old female that presents to the emergency department for evaluation of chest pain shortness of breath and hemoptysis thought secondary to influenza A and sepsis.  Will admit to the hospital for further care and evaluation.    2127- I met with the patient, obtained history, performed an initial exam, and discussed options and plan for diagnostics and treatment here in the ED. DuoNeb administered for bronchospasm.  Solu-Medrol 125 mg IV was administered.  Normal saline 1 L IV was administered for IV hydration.  Zosyn and Zithromax was administered for initiation of antibiotic therapy.  Tamiflu 75 mg p.o. was administered.  Magnesium 2 g IV was administered for her low magnesium level.  Reevaluation reveals that her respiratory distress has improved.  1210- I discussed case with Dr. Shen, resident, who accepts the patient for admission.    Medical Decision Making  Obtained supplemental history:Supplemental history obtained?: Documented in chart  Reviewed external records: External records reviewed?: Documented in chart  Care impacted by chronic illness:Documented in Chart, Hypertension, and Other: Latent TB  Did you consider but not order tests?: Work up considered but not performed and documented in chart, if applicable  Did you interpret images independently?: Independent interpretation of ECG and images noted in  documentation, when applicable.  Consultation discussion with other provider:Did you involve another provider (consultant, , pharmacy, etc.)?: I discussed the care with another health care provider, see documentation for details.  Admit.    MIPS: CT Pulmonary Angiogram:Patient is moderate to high risk for PE. and The patient had an abnormal d-dimer.      Critical Care     Performed by: Dr Carol Landis    Total critical care time: 30 minutes  Critical care was necessary to treat or prevent imminent or life-threatening deterioration of the following conditions: sepsis, respiratory distress  Critical care was time spent personally by me on the following activities: development of treatment plan with patient or surrogate, discussions with consultants, examination of patient, evaluation of patient's response to treatment, obtaining history from patient or surrogate, ordering and performing treatments and interventions, ordering and review of laboratory studies, ordering and review of radiographic studies, re-evaluation of patient's condition and monitoring for potential decompensation.  Critical care time was exclusive of separately billable procedures and treating other patients.      At the conclusion of the encounter I discussed the results of all of the tests and the disposition. The questions were answered. The patient or family acknowledged understanding and was agreeable with the care plan.         MEDICATIONS GIVEN IN THE EMERGENCY:  Medications   lidocaine 1 % 0.1-1 mL (has no administration in time range)   lidocaine (LMX4) cream (has no administration in time range)   sodium chloride (PF) 0.9% PF flush 3 mL (has no administration in time range)   sodium chloride (PF) 0.9% PF flush 3 mL (has no administration in time range)   senna-docusate (SENOKOT-S/PERICOLACE) 8.6-50 MG per tablet 1 tablet (has no administration in time range)     Or   senna-docusate (SENOKOT-S/PERICOLACE) 8.6-50 MG per tablet 2  tablet (has no administration in time range)   calcium carbonate (TUMS) chewable tablet 1,000 mg (has no administration in time range)   acetaminophen (TYLENOL) tablet 650 mg (has no administration in time range)     Or   acetaminophen (TYLENOL) Suppository 650 mg (has no administration in time range)   ondansetron (ZOFRAN ODT) ODT tab 4 mg (has no administration in time range)     Or   ondansetron (ZOFRAN) injection 4 mg (has no administration in time range)   prochlorperazine (COMPAZINE) injection 5 mg (has no administration in time range)     Or   prochlorperazine (COMPAZINE) tablet 5 mg (has no administration in time range)   oseltamivir (TAMIFLU) capsule 30 mg (has no administration in time range)   predniSONE (DELTASONE) tablet 40 mg (has no administration in time range)   ipratropium - albuterol 0.5 mg/2.5 mg/3 mL (DUONEB) neb solution 3 mL (has no administration in time range)   sodium chloride 0.9% BOLUS 1,000 mL (0 mLs Intravenous Stopped 1/11/25 2300)   piperacillin-tazobactam (ZOSYN) 3.375 g vial to attach to  mL bag (0 g Intravenous Stopped 1/11/25 2231)   azithromycin (ZITHROMAX) 500 mg in  mL intermittent infusion (0 mg Intravenous Stopped 1/12/25 0032)   oseltamivir (TAMIFLU) capsule 30 mg (30 mg Oral $Given 1/11/25 2236)   magnesium sulfate 2 g in 50 mL sterile water intermittent infusion (0 g Intravenous Stopped 1/11/25 2302)   iopamidol (ISOVUE-370) solution 75 mL (75 mLs Intravenous $Given 1/11/25 2245)   ipratropium - albuterol 0.5 mg/2.5 mg/3 mL (DUONEB) neb solution 3 mL (3 mLs Nebulization $Given 1/11/25 2308)   methylPREDNISolone Na Suc (solu-MEDROL) injection 125 mg (125 mg Intravenous $Given 1/12/25 0131)       NEW PRESCRIPTIONS STARTED AT TODAY'S ER VISIT  New Prescriptions    No medications on file          =================================================================    HPI        Katja Long is a 74 year old female with a pertinent history of HTN, latent TB, who presents  to this ED via walk-in for evaluation of chest pain, shortness of breath and cough.  Her cough was productive with reddish sputum.  She has not had any fevers chills nausea or vomiting.  Rifampin was started 5 days ago supposedly for prophylaxis against active TB from her latent TB.  She describes her chest pain as sharp, pleuritic, 6 out of 10 in intensity, and not relieved or exacerbated by any particular activity.        PAST MEDICAL HISTORY:  Past Medical History:   Diagnosis Date    Kidney stone        PAST SURGICAL HISTORY:  Past Surgical History:   Procedure Laterality Date    EYE SURGERY      HC REMOVAL GALLBLADDER      Description: Cholecystectomy;  Recorded: 03/14/2014;           CURRENT MEDICATIONS:    atorvastatin (LIPITOR) 20 MG tablet  cholecalciferol 50 MCG (2000 UT) CAPS  diclofenac (VOLTAREN) 1 % topical gel  famotidine (PEPCID) 40 MG tablet  gabapentin (NEURONTIN) 300 MG capsule  GOODSENSE ARTHRITIS PAIN 650 MG CR tablet  ketotifen fumarate 0.035% 0.035 % SOLN ophthalmic solution  lisinopril-hydrochlorothiazide (ZESTORETIC) 20-12.5 MG tablet  VENTOLIN HFA 90 mcg/actuation inhaler        ALLERGIES:  No Known Allergies    FAMILY HISTORY:  History reviewed. No pertinent family history.    SOCIAL HISTORY:   Social History     Socioeconomic History    Marital status:      Spouse name: None    Number of children: None    Years of education: None    Highest education level: None   Tobacco Use    Smoking status: Never     Passive exposure: Never    Smokeless tobacco: Never   Vaping Use    Vaping status: Never Used   Substance and Sexual Activity    Alcohol use: No    Drug use: No       VITALS:  Patient Vitals for the past 24 hrs:   BP Temp Temp src Pulse Resp SpO2   01/12/25 0200 (!) 140/90 -- -- 88 21 100 %   01/11/25 2308 -- -- -- 106 -- 90 %   01/11/25 2210 (!) 154/85 -- -- 105 (!) 41 97 %   01/11/25 2155 124/87 -- -- 106 (!) 43 97 %   01/11/25 2133 -- -- -- 115 (!) 37 98 %   01/11/25 2130 -- --  -- 112 -- 99 %   01/11/25 2128 -- -- -- 118 (!) 38 98 %   01/11/25 2037 (!) 150/76 99.4  F (37.4  C) Oral (!) 123 24 (!) 88 %       PHYSICAL EXAM    Constitutional:  Well developed, Well nourished,  HENT:  Normocephalic, Atraumatic, Bilateral external ears normal, Oropharynx moist, Nose normal.   Neck:  Normal range of motion, No meningismus, No stridor.   Eyes:  EOMI, Conjunctiva normal, No discharge.   Respiratory:  mild respiratory distress,  wheezing bilaterally, tachypnea no chest tenderness.   Cardiovascular: Tachycardia, Normal rhythm, No murmurs  GI:  Soft, No tenderness, No guarding,   Musculoskeletal:  Neurovascularly intact distally, No edema, No tenderness, No cyanosis, Good range of motion in all major joints.    Integument:  Warm, Dry, No erythema, No rash.   Lymphatic:  No lymphadenopathy noted.   Neurologic:  Alert & oriented x 3, Normal motor function,  No focal deficits noted.   Psychiatric:  Affect normal, Judgment normal, Mood normal.      LAB:  All pertinent labs reviewed and interpreted.  Results for orders placed or performed during the hospital encounter of 01/11/25   CT Chest Pulmonary Embolism w Contrast    Impression    IMPRESSION:  1.  No evidence of central or central subsegmental pulmonary embolism. The mid and distal pulmonary arteries are not well opacified.  2.  Mild to moderate emphysematous changes throughout the lungs.  3.  1.3 x 1.5 x 1.9 cm nodularity in the left apex with associated pleural-parenchymal thickening. This may represent scarring however mass is not excluded. Please see below recommendation.  4.  Moderate bronchiectasis changes with bronchial thickening and scattered areas of peribronchial nodularity likely secondary to inspissated secretions in the bronchi.  5.  Ascending thoracic aortic aneurysm measuring 4.6 x 4.7 cm. Recommend Cardiothoracic Surgery consult for further evaluation.      Pulmonary nodules are a common incidental finding on CT. If there is prior  imaging such as a chest radiograph or CT available at another facility, this should be compared to assess for stability.    Regardless of risk factors, referral to a pulmonologist is recommended to discuss further evaluation which may include tissue biopsy, PET-CT, or follow up imaging at 3, 9 and 24 months.    Adapted from Consensus Recommendations, Radiology 2005;237:395-400.        There is aneurysmal dilation of the ascending aorta to 4.6 x 4.7 cm. Recommend Cardiothoracic Surgery consult for further evaluation.    JACR 2018;15:1151-7554 (PMID [id number]), J Cardiothorac Surg 2015 (PMID [id number]), Circulation 2010 (PMID [id number]).    Basic metabolic panel   Result Value Ref Range    Sodium 118 (LL) 135 - 145 mmol/L    Potassium 8.2 (HH) 3.4 - 5.3 mmol/L    Chloride 79 (L) 98 - 107 mmol/L    Carbon Dioxide (CO2) 20 (L) 22 - 29 mmol/L    Anion Gap 19 (H) 7 - 15 mmol/L    Urea Nitrogen 16.2 8.0 - 23.0 mg/dL    Creatinine 0.57 0.51 - 0.95 mg/dL    GFR Estimate >90 >60 mL/min/1.73m2    Calcium 9.9 8.8 - 10.4 mg/dL    Glucose 92 70 - 99 mg/dL   Result Value Ref Range    Troponin T, High Sensitivity 21 (H) <=14 ng/L   Result Value Ref Range    Magnesium 1.2 (L) 1.7 - 2.3 mg/dL   Hepatic function panel   Result Value Ref Range    Protein Total 8.2 6.4 - 8.3 g/dL    Albumin 3.6 3.5 - 5.2 g/dL    Bilirubin Total 0.5 <=1.2 mg/dL    Alkaline Phosphatase      AST      ALT      Bilirubin Direct     Lactic acid whole blood with 1x repeat in 2 hr when >2   Result Value Ref Range    Lactic Acid, Initial 1.3 0.7 - 2.0 mmol/L   Result Value Ref Range    Procalcitonin 0.18 <0.50 ng/mL   Influenza A/B, RSV and SARS-CoV2 PCR (COVID-19) Nasopharyngeal    Specimen: Nasopharyngeal; Swab   Result Value Ref Range    Influenza A PCR Positive (A) Negative    Influenza B PCR Negative Negative    RSV PCR Negative Negative    SARS CoV2 PCR Negative Negative   CBC with platelets and differential   Result Value Ref Range    WBC Count 27.5 (H) 4.0  - 11.0 10e3/uL    RBC Count 4.84 3.80 - 5.20 10e6/uL    Hemoglobin 12.7 11.7 - 15.7 g/dL    Hematocrit 36.2 35.0 - 47.0 %    MCV 75 (L) 78 - 100 fL    MCH 26.2 (L) 26.5 - 33.0 pg    MCHC 35.1 31.5 - 36.5 g/dL    RDW 12.6 10.0 - 15.0 %    Platelet Count 326 150 - 450 10e3/uL    % Neutrophils 86 %    % Lymphocytes 8 %    % Monocytes 5 %    % Eosinophils 0 %    % Basophils 0 %    % Immature Granulocytes 1 %    NRBCs per 100 WBC 0 <1 /100    Absolute Neutrophils 23.7 (H) 1.6 - 8.3 10e3/uL    Absolute Lymphocytes 2.2 0.8 - 5.3 10e3/uL    Absolute Monocytes 1.3 0.0 - 1.3 10e3/uL    Absolute Eosinophils 0.0 0.0 - 0.7 10e3/uL    Absolute Basophils 0.1 0.0 - 0.2 10e3/uL    Absolute Immature Granulocytes 0.2 <=0.4 10e3/uL    Absolute NRBCs 0.0 10e3/uL   Blood gas venous   Result Value Ref Range    pH Venous 7.39 7.32 - 7.43    pCO2 Venous 44 40 - 50 mm Hg    pO2 Venous 39 25 - 47 mm Hg    Bicarbonate Venous 27 21 - 28 mmol/L    Base Excess/Deficit Venous 1.0 -3.0 - 3.0 mmol/L    FIO2 30     Oxyhemoglobin Venous 69 (L) 70 - 75 %    O2 Sat, Venous 69.4 (L) 70.0 - 75.0 %   Result Value Ref Range    INR 0.91 0.85 - 1.15   D dimer quantitative   Result Value Ref Range    D-Dimer Quantitative 1.67 (H) 0.00 - 0.50 ug/mL FEU   Nt probnp inpatient (BNP)   Result Value Ref Range    N terminal Pro BNP Inpatient 83 0 - 900 pg/mL   Basic metabolic panel   Result Value Ref Range    Sodium 121 (L) 135 - 145 mmol/L    Potassium 4.5 3.4 - 5.3 mmol/L    Chloride 81 (L) 98 - 107 mmol/L    Carbon Dioxide (CO2) 21 (L) 22 - 29 mmol/L    Anion Gap 19 (H) 7 - 15 mmol/L    Urea Nitrogen 16.4 8.0 - 23.0 mg/dL    Creatinine 0.66 0.51 - 0.95 mg/dL    GFR Estimate >90 >60 mL/min/1.73m2    Calcium 10.0 8.8 - 10.4 mg/dL    Glucose 91 70 - 99 mg/dL   Result Value Ref Range    Troponin T, High Sensitivity 22 (H) <=14 ng/L   ECG 12-LEAD WITH MUSE (LHE)   Result Value Ref Range    Systolic Blood Pressure  mmHg    Diastolic Blood Pressure  mmHg    Ventricular  Rate 127 BPM    Atrial Rate 127 BPM    NY Interval 148 ms    QRS Duration 88 ms     ms    QTc 438 ms    P Axis 66 degrees    R AXIS -47 degrees    T Axis 73 degrees    Interpretation ECG       Sinus tachycardia  Left anterior fascicular block  Minimal voltage criteria for LVH, may be normal variant  Abnormal ECG  When compared with ECG of 04-Oct-2024 09:43,  NY interval has decreased  Vent. rate has increased by  71 bpm  Confirmed by SEE ED PROVIDER NOTE FOR, ECG INTERPRETATION ([id number]),  SARA ERYES ([id number]) on 1/11/2025 8:58:56 PM         RADIOLOGY:  I have independently reviewed and interpreted the above imaging, pending the final radiology read.  CT Chest Pulmonary Embolism w Contrast   Final Result   IMPRESSION:   1.  No evidence of central or central subsegmental pulmonary embolism. The mid and distal pulmonary arteries are not well opacified.   2.  Mild to moderate emphysematous changes throughout the lungs.   3.  1.3 x 1.5 x 1.9 cm nodularity in the left apex with associated pleural-parenchymal thickening. This may represent scarring however mass is not excluded. Please see below recommendation.   4.  Moderate bronchiectasis changes with bronchial thickening and scattered areas of peribronchial nodularity likely secondary to inspissated secretions in the bronchi.   5.  Ascending thoracic aortic aneurysm measuring 4.6 x 4.7 cm. Recommend Cardiothoracic Surgery consult for further evaluation.         Pulmonary nodules are a common incidental finding on CT. If there is prior imaging such as a chest radiograph or CT available at another facility, this should be compared to assess for stability.      Regardless of risk factors, referral to a pulmonologist is recommended to discuss further evaluation which may include tissue biopsy, PET-CT, or follow up imaging at 3, 9 and 24 months.      Adapted from Consensus Recommendations, Radiology 2005;237:395-400.            There is aneurysmal dilation of  the ascending aorta to 4.6 x 4.7 cm. Recommend Cardiothoracic Surgery consult for further evaluation.      JACR 2018;15:6339-3139 (PMID [id number]), J Cardiothorac Surg 2015 (PMID [id number]), Circulation 2010 (PMID [id number]).           EK-127, sinus, no st segment elevation or depression  I have independently reviewed and interpreted this EKG          I, Brandie Feliz, am serving as a scribe to document services personally performed by Dr. Landis based on my observation and the provider's statements to me. I, Carol Landis MD attest that Brandie Feliz is acting in a scribe capacity, has observed my performance of the services and has documented them in accordance with my direction.    Carol Landis MD  Emergency Medicine  [facility] EMERGENCY ROOM  [address] [facility] [address] [id number] [phone]  Dept: [phone]     Carol Landis MD  [phone]

## 2025-01-12 NOTE — ED TRIAGE NOTES
Pt here with chest pain, sob, cough. Cough is productive of red tinged phlegm. Pt taking med for latent TB. Pt just back from [place] last week of December. Pt denies light headedness nor dizziness. No appetite last few days. Denies taking tylenol nor ibuprofen today. Denies nausea.     Rifampin started Monday. Has taken 2 days ago. Was prescribed to keep from getting active TB. Increased sciatica pain.

## 2025-01-12 NOTE — PLAN OF CARE
Tried weaning pt off of 02 but pt kept going between 86-88% so now back on 1 liter of 02 with sats above 90%. Needs . NA trending up. On Mg protocol with recheck in the AM 1/13. Blood cultures still pending. Receiving IV antibiotics. Family at bedside. Airborne precautions maintained.   Shannon Moreno, RN on 1/12/2025 at 2:02 PM   Problem: Pneumonia  Goal: Fluid Balance  Outcome: Progressing  Goal: Resolution of Infection Signs and Symptoms  Outcome: Progressing  Intervention: Prevent Infection Progression  Recent Flowsheet Documentation  Taken 1/12/2025 0930 by Shannon Moreno, RN  Isolation Precautions: airborne precautions maintained  Goal: Effective Oxygenation and Ventilation  Outcome: Progressing   Goal Outcome Evaluation:

## 2025-01-13 ENCOUNTER — APPOINTMENT (OUTPATIENT)
Dept: PHYSICAL THERAPY | Facility: CLINIC | Age: 75
End: 2025-01-13
Payer: COMMERCIAL

## 2025-01-13 LAB
ANION GAP SERPL CALCULATED.3IONS-SCNC: 12 MMOL/L (ref 7–15)
BUN SERPL-MCNC: 18.8 MG/DL (ref 8–23)
CALCIUM SERPL-MCNC: 10.1 MG/DL (ref 8.8–10.4)
CHLORIDE SERPL-SCNC: 89 MMOL/L (ref 98–107)
CREAT SERPL-MCNC: 0.64 MG/DL (ref 0.51–0.95)
EGFRCR SERPLBLD CKD-EPI 2021: >90 ML/MIN/1.73M2
ERYTHROCYTE [DISTWIDTH] IN BLOOD BY AUTOMATED COUNT: 12.5 % (ref 10–15)
GLUCOSE SERPL-MCNC: 112 MG/DL (ref 70–99)
HCO3 SERPL-SCNC: 25 MMOL/L (ref 22–29)
HCT VFR BLD AUTO: 29 % (ref 35–47)
HGB BLD-MCNC: 10.1 G/DL (ref 11.7–15.7)
MAGNESIUM SERPL-MCNC: 1.7 MG/DL (ref 1.7–2.3)
MCH RBC QN AUTO: 26.3 PG (ref 26.5–33)
MCHC RBC AUTO-ENTMCNC: 34.8 G/DL (ref 31.5–36.5)
MCV RBC AUTO: 76 FL (ref 78–100)
PLATELET # BLD AUTO: 321 10E3/UL (ref 150–450)
POTASSIUM SERPL-SCNC: 4.1 MMOL/L (ref 3.4–5.3)
RBC # BLD AUTO: 3.84 10E6/UL (ref 3.8–5.2)
SODIUM SERPL-SCNC: 126 MMOL/L (ref 135–145)
WBC # BLD AUTO: 13.8 10E3/UL (ref 4–11)

## 2025-01-13 PROCEDURE — 85018 HEMOGLOBIN: CPT

## 2025-01-13 PROCEDURE — 83735 ASSAY OF MAGNESIUM: CPT

## 2025-01-13 PROCEDURE — 36415 COLL VENOUS BLD VENIPUNCTURE: CPT

## 2025-01-13 PROCEDURE — 120N000001 HC R&B MED SURG/OB

## 2025-01-13 PROCEDURE — 82435 ASSAY OF BLOOD CHLORIDE: CPT

## 2025-01-13 PROCEDURE — 999N000157 HC STATISTIC RCP TIME EA 10 MIN

## 2025-01-13 PROCEDURE — 250N000012 HC RX MED GY IP 250 OP 636 PS 637

## 2025-01-13 PROCEDURE — 97110 THERAPEUTIC EXERCISES: CPT | Mod: GP

## 2025-01-13 PROCEDURE — 80048 BASIC METABOLIC PNL TOTAL CA: CPT

## 2025-01-13 PROCEDURE — 94640 AIRWAY INHALATION TREATMENT: CPT | Mod: 76

## 2025-01-13 PROCEDURE — 94640 AIRWAY INHALATION TREATMENT: CPT

## 2025-01-13 PROCEDURE — 999N000111 HC STATISTIC OT IP EVAL DEFER

## 2025-01-13 PROCEDURE — 97161 PT EVAL LOW COMPLEX 20 MIN: CPT | Mod: GP

## 2025-01-13 PROCEDURE — 250N000013 HC RX MED GY IP 250 OP 250 PS 637: Performed by: STUDENT IN AN ORGANIZED HEALTH CARE EDUCATION/TRAINING PROGRAM

## 2025-01-13 PROCEDURE — 250N000009 HC RX 250

## 2025-01-13 PROCEDURE — 250N000013 HC RX MED GY IP 250 OP 250 PS 637

## 2025-01-13 RX ADMIN — PREDNISONE 40 MG: 20 TABLET ORAL at 09:29

## 2025-01-13 RX ADMIN — OSELTAMIVIR PHOSPHATE 30 MG: 30 CAPSULE ORAL at 21:49

## 2025-01-13 RX ADMIN — ATORVASTATIN CALCIUM 20 MG: 10 TABLET, FILM COATED ORAL at 17:03

## 2025-01-13 RX ADMIN — OSELTAMIVIR PHOSPHATE 30 MG: 30 CAPSULE ORAL at 09:29

## 2025-01-13 RX ADMIN — IPRATROPIUM BROMIDE AND ALBUTEROL SULFATE 3 ML: .5; 3 SOLUTION RESPIRATORY (INHALATION) at 11:23

## 2025-01-13 RX ADMIN — RIFAMPIN 600 MG: 300 CAPSULE ORAL at 09:30

## 2025-01-13 RX ADMIN — IPRATROPIUM BROMIDE AND ALBUTEROL SULFATE 3 ML: .5; 3 SOLUTION RESPIRATORY (INHALATION) at 20:45

## 2025-01-13 RX ADMIN — ACETAMINOPHEN 650 MG: 325 TABLET ORAL at 04:00

## 2025-01-13 RX ADMIN — Medication 50 MCG: at 09:29

## 2025-01-13 RX ADMIN — FAMOTIDINE 20 MG: 20 TABLET ORAL at 21:53

## 2025-01-13 RX ADMIN — LISINOPRIL AND HYDROCHLOROTHIAZIDE 1 TABLET: 12.5; 2 TABLET ORAL at 09:29

## 2025-01-13 RX ADMIN — IPRATROPIUM BROMIDE AND ALBUTEROL SULFATE 3 ML: .5; 3 SOLUTION RESPIRATORY (INHALATION) at 15:35

## 2025-01-13 RX ADMIN — IPRATROPIUM BROMIDE AND ALBUTEROL SULFATE 3 ML: .5; 3 SOLUTION RESPIRATORY (INHALATION) at 08:13

## 2025-01-13 ASSESSMENT — ACTIVITIES OF DAILY LIVING (ADL)
ADLS_ACUITY_SCORE: 34
ADLS_ACUITY_SCORE: 34
ADLS_ACUITY_SCORE: 28
ADLS_ACUITY_SCORE: 34
ADLS_ACUITY_SCORE: 34
ADLS_ACUITY_SCORE: 28
ADLS_ACUITY_SCORE: 34
ADLS_ACUITY_SCORE: 28
ADLS_ACUITY_SCORE: 34
ADLS_ACUITY_SCORE: 28
ADLS_ACUITY_SCORE: 34
ADLS_ACUITY_SCORE: 28
ADLS_ACUITY_SCORE: 34
ADLS_ACUITY_SCORE: 28
ADLS_ACUITY_SCORE: 28

## 2025-01-13 NOTE — PLAN OF CARE
Problem: Pneumonia  Goal: Effective Oxygenation and Ventilation  Outcome: Progressing     Problem: Gas Exchange Impaired  Goal: Optimal Gas Exchange  Outcome: Progressing     Goal Outcome Evaluation:    Pt is alert and oriented, calls appropriately for needs. Assist of one with ambulation. Vitals signs are stable, afebrile, oxygen WNL on 1L. Pt is tolerating regular diet, decreased appetite. Pt has intermittent cough. She requested PRN famotidine and PRN gabapentin at bedtime.    Plan: Wean o2 as able    Protocols:  Magnesium, 1.8, recheck next AM    Jacinto Day RN  January 12, 2025, 9:40 PM

## 2025-01-13 NOTE — PROGRESS NOTES
01/13/25 0900   Appointment Info   Signing Clinician's Name / Credentials (PT) Cookie Henderson, PT, DPT   Living Environment   People in Home child(simon), adult  (Daughter and THADDEUS)   Current Living Arrangements house   Home Accessibility stairs within home   Number of Stairs, Within Home, Primary greater than 10 stairs  (Basement level)   Stair Railings, Within Home, Primary railings safe and in good condition   Transportation Anticipated family or friend will provide   Living Environment Comments All needs met on main level.   Self-Care   Usual Activity Tolerance moderate   Current Activity Tolerance moderate   Equipment Currently Used at Home none   Fall history within last six months no   Activity/Exercise/Self-Care Comment Patient has 5 hours of PCA services per day.   General Information   Onset of Illness/Injury or Date of Surgery 01/11/25   Referring Physician Peyton Diaz MD   Patient/Family Therapy Goals Statement (PT) Return home with family   Pertinent History of Current Problem (include personal factors and/or comorbidities that impact the POC) Hypoxemia; Influenza A; Latent TB   Existing Precautions/Restrictions no known precautions/restrictions   General Observations RA   Posture    Posture Not impaired   Range of Motion (ROM)   Range of Motion ROM is WFL   Strength (Manual Muscle Testing)   Strength (Manual Muscle Testing) strength is WFL   Bed Mobility   Bed Mobility no deficits identified   Transfers   Transfers no deficits identified   Gait/Stairs (Locomotion)   Sadler Level (Gait) supervision   Assistive Device (Gait) other (see comments)  (None)   Distance in Feet (Gait) 10'   Pattern (Gait) step-through   Deviations/Abnormal Patterns (Gait) filemon decreased   Balance   Balance no deficits were identified   Clinical Impression   Criteria for Skilled Therapeutic Intervention Yes, treatment indicated   PT Diagnosis (PT) Impaired activity tolerance   Influenced by the following  impairments Respiratory illness and shortness of breath   Functional limitations due to impairments Gait   Clinical Presentation (PT Evaluation Complexity) stable   Clinical Presentation Rationale Patient presents as medically diagnosed.   Clinical Decision Making (Complexity) low complexity   Planned Therapy Interventions (PT) gait training;home exercise program;patient/family education;progressive activity/exercise;home program guidelines   Risk & Benefits of therapy have been explained evaluation/treatment results reviewed;care plan/treatment goals reviewed;patient;daughter   PT Total Evaluation Time   PT Eval, Low Complexity Minutes (44635) 10   Physical Therapy Goals   PT Frequency One time eval and treatment only   PT Predicted Duration/Target Date for Goal Attainment 01/13/25   PT Goals Gait;PT Goal 1   PT: Gait Supervision/stand-by assist;100 feet;Goal Met   PT: Goal 1 Patient will be able to complete seated BLE HEP with supervision.  (Goal Met)   Interventions   Interventions Quick Adds Therapeutic Procedure   Therapeutic Procedure/Exercise   Ther. Procedure: strength, endurance, ROM, flexibillity Minutes (55291) 10   Symptoms Noted During/After Treatment fatigue   Treatment Detail/Skilled Intervention Patient continued ambulation in room to improve activity tolerance: SnO2 92% and HR 77 bpm on RA. Patient denied shortness of breath but reported LE fatigue, R > L. Educated patient and daughter on seated BLE HEP x 10 reps each with demonstration and tactile/verbal cues: Ankle pumps, LAQs, and marches. Also educated patient and daughter on importance of continued ambulation/mobility to prevent deconditioning while recovering from respiratory illness.   PT Discharge Planning   PT Plan Discharge PT. All goals met.   PT Discharge Recommendation (DC Rec) home with assist   PT Rationale for DC Rec Patient lives with daughter and THADDEUS who can assist with I/ADLs as needed. Patient is able to ambulate/mobilize with  supervision. No balance or strength deficits identified.   PT Brief overview of current status Supervision for ambulation x 100' in room.   PT Total Distance Amb During Session (feet) 100   PT Equipment Needed at Discharge other (see comments)  (Patient owns DME to use as needed.)   Physical Therapy Time and Intention   Timed Code Treatment Minutes 10   Total Session Time (sum of timed and untimed services) 20     Physical Therapy Discharge Summary    Reason for therapy discharge:    All goals and outcomes met, no further needs identified.    Progress towards therapy goal(s). See goals on Care Plan in [facility] electronic health record for goal details.  Goals met    Therapy recommendation(s):    Continue home exercise program.

## 2025-01-13 NOTE — PROGRESS NOTES
[facility]    Progress Note - Hospitalist Service       Date of Admission:  1/11/2025    Assessment & Plan   Katja Long is a 74 year old female admitted on 1/11/2025. She has ahistory of  HTN, latent TB on rifampin, pulmonary scarring, GERD,  and is admitted for hypoxemia likely secondary to influenza A and asthma vs COPD exacerbation, and hyponatremia.    Influenza A, sepsis  Hypoxemia  Asthma vs COPD exacerbation  Resolving sepsis  Patient initially presented to the ER with elevated white count at 23.6, tachycardia, and hypoxemia which qualifies for SIRS.  At this time, the patient is vitally stable.  White count is downtrending to 13.8.  Patient denies formal diagnosis of COPD, emphysema, or asthma.  However, PCP has prescribed Ventolin inhaler which patient uses infrequently.  There could be some aspect of asthma exacerbation/COPD exacerbation underlying this influenza A infection.  Will treat influenza as well as presumed asthma/ COPD exacerbation given emphysematous changes on imaging.  Patient is off of nasal cannula.  Tamiflu X 4 days  Prednisone X 4 days  DuoNebs scheduled  Tylenol prn  PT/OT consulted, appreciate recommendations for cares     Acute on Chronic Hyponatremia  Hypomagnesemia, improved  Initial Na 121, Mg 1.2. 1/13 AM improved to Na 126, Mg 1.7  Has been hyponatremic in the past with baseline for last few months appearing to be in low 130s. Likely acute on chronic with concern for too quick of correction   Consider discontinuing hydrochlorothiazide component of lisinopril-hydrochlorothiazide, formal fluid restriction if not correcting.  Magnesium replacement protocol  AM BMP, Mg    Elevated troponin, stable   Troponin elevated to 21, No delta change.  EKG in emergency department with sinus tachycardia and left anterior fascicular block. Patient denies any chest pain, shortness of breath only after coughing.  No further workup indicated at this time.     Latent  TB  Patient reported to begin rifampin on Monday, took for 2 days before discontinuing.  Reportedly developed red-tinged sputum in the setting of influenza above.  Discussed that rifampin can turn bodily secretions orange/red. Imaging showing 1.2 x1.5 x1.9 cm nodularity in left apex which per family was present previously when patient was undergoing workup with [facility] for TB. Determined no active TB.  Continue PTA Rifampin    Imaging Finding  Ascending thoracic aortic aneurysm measuring 4.6 x 4.7 cm. Family aware of finding.  - Radiology recommending Cardiothoracic Surgery consult for further evaluation; Will recommend this at discharge in outpatient setting.      Chronic conditions:  HTN: PTA Lisinopril-hydrochlorothiazide  HLD: PTA atorvastatin  GERD: PTA Famotidine        Diet: Combination Diet Regular Diet Adult    DVT Prophylaxis: Pneumatic Compression Devices  Mendoza Catheter: Not present  Fluids: PO  Lines: None     Cardiac Monitoring: None  Code Status: No CPR- Do NOT Intubate      Clinically Significant Risk Factors        # Hyperkalemia: Highest K = 8.2 mmol/L in last 2 days, will monitor as appropriate  # Hyponatremia: Lowest Na = 118 mmol/L in last 2 days, will monitor as appropriate  # Hypochloremia: Lowest Cl = 79 mmol/L in last 2 days, will monitor as appropriate    # Hypomagnesemia: Lowest Mg = 1.2 mg/dL in last 2 days, will replace as needed  # Anion Gap Metabolic Acidosis: Highest Anion Gap = 19 mmol/L in last 2 days, will monitor and treat as appropriate      # Hypertension: Noted on problem list            # Cachexia: Estimated body mass index is 15.82 kg/m  as calculated from the following:    Height as of this encounter: 1.524 m (5').    Weight as of this encounter: 36.7 kg (81 lb)., PRESENT ON ADMISSION            Social Drivers of Health   Depression: At risk (3/4/2024)    PHQ-2     PHQ-2 Score: 6         Disposition Plan     Medically Ready for Discharge: Anticipated Tomorrow          The patient's care was discussed with the Attending Physician, Dr. Yan .    Nidia Salomon DO, PGY1  Hospitalist Service  [facility]  Securely message with [employer] (more info)  Text page via [employer] Paging/Directory   ______________________________________________________________________    Interval History   Daughter at bedside.  Patient reports of feeling better compared to yesterday.  Has an occasional cough while talking.  Appetite is intact.  Denies any pain including chest pain, family is aware of imaging results including pulmonary nodule that was consistent with previous imaging (per family) but sputum samples were negative for active TB hence the patient's treatment for latent TB. Patient and family understanding of the side effect of rifampin turning body fluids red-tinged.  The patient has not had a bowel movement since hospitalization; this is probably secondary to not eating.  Denies of other symptoms at this time.      Physical Exam   Vital Signs: Temp: 98.3  F (36.8  C) Temp src: Oral BP: 123/60 Pulse: 63   Resp: 18 SpO2: 95 % O2 Device: None (Room air) Oxygen Delivery: 1 LPM  Weight: 81 lbs 0 oz    PHYSICAL EXAM:  GENERAL: Awake, alert, No acute distress.   HEENT: No scleral icterus or conjunctival injection.   SKIN: Warm and dry.   LUNGS: Normal work of breathing with no use of accessory muscles. Inspiratory wheezing in anterior lung fields, intermittent scattered inspiratory and expiratory wheeze in posterior bases.   CARDIAC: RRR. Normal S1 and S2. No murmurs, clicks, or rubs appreciated. No peripheral edema.  ABDOMEN: Non-distended. Soft and non-tender throughout with no masses or organomegaly.  NEUROLOGIC: Alert and oriented.   EXTREMITIES: No gross deformity or peripheral edema. Appear well-perfused.       Data     I have personally reviewed the following data over the past 24 hrs:    13.8 (H)  \   10.1 (L)   / 321     126 (L) 89 (L) 18.8 /  112 (H)   4.1 25 0.64 \        Imaging results reviewed over the past 24 hrs:   No results found for this or any previous visit (from the past 24 hours).

## 2025-01-13 NOTE — PLAN OF CARE
A/O. VSS on RA; O2 sats in mid 90's. Tylenol administered for overall body aches. Family at bedside, attentive to pt.   Pt prefers hot water only.  Call light within reach. Able to make needs known. NAEON. Will continue to monitor.    Goal Outcome Evaluation:    Problem: Adult Inpatient Plan of Care  Goal: Absence of Hospital-Acquired Illness or Injury  Intervention: Prevent Infection  Recent Flowsheet Documentation  Taken 1/13/2025 0145 by Desi King, RN  Infection Prevention:   cohorting utilized   hand hygiene promoted   rest/sleep promoted   personal protective equipment utilized   single patient room provided     Problem: Pneumonia  Goal: Fluid Balance  Outcome: Progressing     Problem: Pneumonia  Goal: Effective Oxygenation and Ventilation  Intervention: Optimize Oxygenation and Ventilation  Recent Flowsheet Documentation  Taken 1/13/2025 0145 by Desi King, RN  Head of Bed (HOB) Positioning: HOB at 20-30 degrees

## 2025-01-13 NOTE — PROGRESS NOTES
Report given to charge nurse on 3 south. Belongings remain with pt. Pt en route to room 3108 via stretcher bed.

## 2025-01-13 NOTE — PROGRESS NOTES
Occupational Therapy: Orders received. Chart reviewed and discussed with care team.? Occupational Therapy not indicated due to PT deferring OT as Pt will have assist with all of her ADLs and IADLs at home from her Dtr and Son-In-Law and from her PCA .? Defer discharge recommendations to PT.? Will complete orders.

## 2025-01-13 NOTE — PROGRESS NOTES
Patient is on 1 lpm O2 by nasal cannula; sats high 90s. Shallow breathing noted. Duoneb given. BS diminished pre/post treatment.     Wild Ernandez, RT

## 2025-01-13 NOTE — PLAN OF CARE
Goal Outcome Evaluation:      Problem: Adult Inpatient Plan of Care  Goal: Plan of Care Review  Description: The Plan of Care Review/Shift note should be completed every shift.  The Outcome Evaluation is a brief statement about your assessment that the patient is improving, declining, or no change.  This information will be displayed automatically on your shift  note.  Outcome: Progressing     VSS on RA. Airborne precautions discontinued. Droplet precautions maintained.

## 2025-01-14 LAB
ANION GAP SERPL CALCULATED.3IONS-SCNC: 15 MMOL/L (ref 7–15)
BUN SERPL-MCNC: 16.6 MG/DL (ref 8–23)
CALCIUM SERPL-MCNC: 10.1 MG/DL (ref 8.8–10.4)
CHLORIDE SERPL-SCNC: 86 MMOL/L (ref 98–107)
CREAT SERPL-MCNC: 0.74 MG/DL (ref 0.51–0.95)
EGFRCR SERPLBLD CKD-EPI 2021: 85 ML/MIN/1.73M2
ERYTHROCYTE [DISTWIDTH] IN BLOOD BY AUTOMATED COUNT: 12.5 % (ref 10–15)
GLUCOSE BLDC GLUCOMTR-MCNC: 103 MG/DL (ref 70–99)
GLUCOSE SERPL-MCNC: 103 MG/DL (ref 70–99)
HCO3 SERPL-SCNC: 26 MMOL/L (ref 22–29)
HCT VFR BLD AUTO: 33.8 % (ref 35–47)
HGB BLD-MCNC: 11.7 G/DL (ref 11.7–15.7)
MAGNESIUM SERPL-MCNC: 1.4 MG/DL (ref 1.7–2.3)
MAGNESIUM SERPL-MCNC: 2 MG/DL (ref 1.7–2.3)
MCH RBC QN AUTO: 26.4 PG (ref 26.5–33)
MCHC RBC AUTO-ENTMCNC: 34.6 G/DL (ref 31.5–36.5)
MCV RBC AUTO: 76 FL (ref 78–100)
PLATELET # BLD AUTO: 424 10E3/UL (ref 150–450)
POTASSIUM SERPL-SCNC: 3.9 MMOL/L (ref 3.4–5.3)
RBC # BLD AUTO: 4.44 10E6/UL (ref 3.8–5.2)
SODIUM SERPL-SCNC: 127 MMOL/L (ref 135–145)
WBC # BLD AUTO: 9.7 10E3/UL (ref 4–11)

## 2025-01-14 PROCEDURE — 85014 HEMATOCRIT: CPT

## 2025-01-14 PROCEDURE — 999N000157 HC STATISTIC RCP TIME EA 10 MIN

## 2025-01-14 PROCEDURE — 36415 COLL VENOUS BLD VENIPUNCTURE: CPT

## 2025-01-14 PROCEDURE — 120N000001 HC R&B MED SURG/OB

## 2025-01-14 PROCEDURE — 250N000009 HC RX 250

## 2025-01-14 PROCEDURE — 250N000013 HC RX MED GY IP 250 OP 250 PS 637

## 2025-01-14 PROCEDURE — 83735 ASSAY OF MAGNESIUM: CPT | Performed by: FAMILY MEDICINE

## 2025-01-14 PROCEDURE — 250N000012 HC RX MED GY IP 250 OP 636 PS 637

## 2025-01-14 PROCEDURE — 82435 ASSAY OF BLOOD CHLORIDE: CPT

## 2025-01-14 PROCEDURE — 94799 UNLISTED PULMONARY SVC/PX: CPT

## 2025-01-14 PROCEDURE — 94640 AIRWAY INHALATION TREATMENT: CPT

## 2025-01-14 PROCEDURE — 258N000003 HC RX IP 258 OP 636

## 2025-01-14 PROCEDURE — 272N000202 HC AEROBIKA WITH MANOMETER

## 2025-01-14 PROCEDURE — 250N000011 HC RX IP 250 OP 636

## 2025-01-14 PROCEDURE — 94640 AIRWAY INHALATION TREATMENT: CPT | Mod: 76

## 2025-01-14 PROCEDURE — 84295 ASSAY OF SERUM SODIUM: CPT

## 2025-01-14 PROCEDURE — 80048 BASIC METABOLIC PNL TOTAL CA: CPT

## 2025-01-14 PROCEDURE — 83735 ASSAY OF MAGNESIUM: CPT

## 2025-01-14 RX ORDER — FAMOTIDINE 20 MG/1
20 TABLET, FILM COATED ORAL DAILY
Status: DISCONTINUED | OUTPATIENT
Start: 2025-01-14 | End: 2025-01-15 | Stop reason: HOSPADM

## 2025-01-14 RX ORDER — BENZONATATE 100 MG/1
100 CAPSULE ORAL 3 TIMES DAILY PRN
Status: DISCONTINUED | OUTPATIENT
Start: 2025-01-14 | End: 2025-01-15 | Stop reason: HOSPADM

## 2025-01-14 RX ORDER — LISINOPRIL 10 MG/1
10 TABLET ORAL DAILY
Status: DISCONTINUED | OUTPATIENT
Start: 2025-01-15 | End: 2025-01-15 | Stop reason: HOSPADM

## 2025-01-14 RX ORDER — MAGNESIUM SULFATE HEPTAHYDRATE 40 MG/ML
2 INJECTION, SOLUTION INTRAVENOUS ONCE
Status: COMPLETED | OUTPATIENT
Start: 2025-01-14 | End: 2025-01-14

## 2025-01-14 RX ADMIN — IPRATROPIUM BROMIDE AND ALBUTEROL SULFATE 3 ML: .5; 3 SOLUTION RESPIRATORY (INHALATION) at 08:20

## 2025-01-14 RX ADMIN — LISINOPRIL AND HYDROCHLOROTHIAZIDE 1 TABLET: 12.5; 2 TABLET ORAL at 08:55

## 2025-01-14 RX ADMIN — IPRATROPIUM BROMIDE AND ALBUTEROL SULFATE 3 ML: .5; 3 SOLUTION RESPIRATORY (INHALATION) at 15:55

## 2025-01-14 RX ADMIN — PREDNISONE 40 MG: 20 TABLET ORAL at 08:55

## 2025-01-14 RX ADMIN — BENZONATATE 100 MG: 100 CAPSULE ORAL at 22:01

## 2025-01-14 RX ADMIN — OSELTAMIVIR PHOSPHATE 30 MG: 30 CAPSULE ORAL at 08:55

## 2025-01-14 RX ADMIN — Medication 50 MCG: at 08:55

## 2025-01-14 RX ADMIN — ATORVASTATIN CALCIUM 20 MG: 10 TABLET, FILM COATED ORAL at 16:53

## 2025-01-14 RX ADMIN — RIFAMPIN 600 MG: 300 CAPSULE ORAL at 08:55

## 2025-01-14 RX ADMIN — OSELTAMIVIR PHOSPHATE 30 MG: 30 CAPSULE ORAL at 21:18

## 2025-01-14 RX ADMIN — FAMOTIDINE 20 MG: 20 TABLET, FILM COATED ORAL at 10:31

## 2025-01-14 RX ADMIN — MAGNESIUM SULFATE HEPTAHYDRATE 2 G: 40 INJECTION, SOLUTION INTRAVENOUS at 13:37

## 2025-01-14 RX ADMIN — IPRATROPIUM BROMIDE AND ALBUTEROL SULFATE 3 ML: .5; 3 SOLUTION RESPIRATORY (INHALATION) at 20:01

## 2025-01-14 RX ADMIN — SODIUM CHLORIDE: 9 INJECTION, SOLUTION INTRAVENOUS at 13:31

## 2025-01-14 RX ADMIN — ACETAMINOPHEN 650 MG: 325 TABLET ORAL at 11:48

## 2025-01-14 RX ADMIN — IPRATROPIUM BROMIDE AND ALBUTEROL SULFATE 3 ML: .5; 3 SOLUTION RESPIRATORY (INHALATION) at 12:06

## 2025-01-14 ASSESSMENT — ACTIVITIES OF DAILY LIVING (ADL)
ADLS_ACUITY_SCORE: 34

## 2025-01-14 NOTE — PLAN OF CARE
Goal Outcome Evaluation:    Pt denies pain.  Up independent in room.  Pt continues to be on room air.  Family at bedside.  When medically stable, pt will discharge to home with family.  VSS.      Problem: Adult Inpatient Plan of Care  Goal: Plan of Care Review  Description: The Plan of Care Review/Shift note should be completed every shift.  The Outcome Evaluation is a brief statement about your assessment that the patient is improving, declining, or no change.  This information will be displayed automatically on your shift  note.  Outcome: Progressing     Problem: Pneumonia  Goal: Fluid Balance  Outcome: Progressing     Problem: Gas Exchange Impaired  Goal: Optimal Gas Exchange  Outcome: Progressing

## 2025-01-14 NOTE — PROGRESS NOTES
[facility]    Progress Note - Hospitalist Service       Date of Admission:  1/11/2025    Assessment & Plan   Katja Long is a 74 year old female admitted on 1/11/2025. She has ahistory of  HTN, latent TB on rifampin, pulmonary scarring, GERD,  and is admitted for hypoxemia likely secondary to influenza A and asthma vs COPD exacerbation, and hyponatremia.    Influenza A, sepsis. Sepsis resolved.  Hypoxemia, improved.  Asthma vs COPD exacerbation  Patient initially presented to the ER with elevated white count at 23.6, tachycardia, and hypoxemia which qualifies for SIRS.  At this time, the patient is vitally stable.  White count is downtrending to 13.8.  Patient denies formal diagnosis of COPD, emphysema, or asthma.  However, PCP has prescribed Ventolin inhaler which patient uses infrequently.  There could be some aspect of asthma exacerbation/COPD exacerbation underlying this influenza A infection.  Will treat influenza as well as presumed asthma/ COPD exacerbation given emphysematous changes on imaging.  Patient is off of nasal cannula.  Tamiflu to 1/17/2025  Prednisone to 1/17/2025  Michael scheduled  Tylenol prn  PT/OT consulted, appreciate recommendations for cares     Acute on Chronic Hyponatremia  Hypomagnesemia, improved  Initial Na 121, Mg 1.2. 1/14 AM improved to Na 127, Mag 1.7  Has been hyponatremic in the past with baseline for last few months appearing to be in low 130s. Likely acute on chronic with concern for too quick of correction.  The patient reports of dizziness and headaches today which happened after standing up to go to the bathroom; dry mucous membranes present on physical exam.  Discontinue lisinopril-hydrochlorothiazide; initiated lisinopril 10 mg daily.  Start normal saline 1 L infusion at 75 mL/h  Magnesium replacement protocol  AM BMP, Mg    GERD  Patient has a history of heartburn.  She is not taking Pepcid as needed at night.  However in the last 24 hours, the  patient reported of persistent GERD like pain in her epigastrium region.  The last time she has taken Pepcid was last night.   Change PRN to Scheduled oral Pepcid 20mg daily     Elevated troponin, stable   Troponin elevated to 21, No delta change.  EKG in emergency department with sinus tachycardia and left anterior fascicular block. Patient denies any chest pain, shortness of breath only after coughing.  No further workup indicated at this time.     Latent TB  Patient reported to begin rifampin on Monday, took for 2 days before discontinuing.  Reportedly developed red-tinged sputum in the setting of influenza above.  Discussed that rifampin can turn bodily secretions orange/red. Imaging showing 1.2 x1.5 x1.9 cm nodularity in left apex which per family was present previously when patient was undergoing workup with [facility] for TB. Determined no active TB.  Continue PTA Rifampin    Imaging Finding  Ascending thoracic aortic aneurysm measuring 4.6 x 4.7 cm. Family aware of finding.  - Radiology recommending Cardiothoracic Surgery consult for further evaluation; Will recommend this at discharge in outpatient setting.      Chronic conditions:  HTN: PTA Lisinopril-hydrochlorothiazide  HLD: PTA atorvastatin  GERD: PTA Famotidine        Diet: Combination Diet Regular Diet Adult    DVT Prophylaxis: Pneumatic Compression Devices  Mendoza Catheter: Not present  Fluids: PO  Lines: None     Cardiac Monitoring: None  Code Status: No CPR- Do NOT Intubate      Clinically Significant Risk Factors         # Hyponatremia: Lowest Na = 125 mmol/L in last 2 days, will monitor as appropriate  # Hypochloremia: Lowest Cl = 87 mmol/L in last 2 days, will monitor as appropriate          # Hypertension: Noted on problem list            # Cachexia: Estimated body mass index is 15.82 kg/m  as calculated from the following:    Height as of this encounter: 1.524 m (5').    Weight as of this encounter: 36.7 kg (81 lb)., PRESENT ON ADMISSION             Social Drivers of Health   Depression: At risk (3/4/2024)    PHQ-2     PHQ-2 Score: 6         Disposition Plan     Medically Ready for Discharge: Anticipated Tomorrow         The patient's care was discussed with the Attending Physician, Dr. Yan .    Nidia Salomon DO, PGY1  Hospitalist Service  [facility]  Securely message with [employer] (more info)  Text page via [facility] Paging/Directory   ______________________________________________________________________    Interval History   Daughter at bedside.  Patient reports of not feeling as well compared to yesterday.  She reports that she is experiencing dizziness along with headaches since standing up to going to the bathroom at around 8 AM today.  She reports the dizziness has she herself is spinning.  She is also not eating and drinking as much compared to normal.  She continues to have epigastric pain that is similar to her GERD pain.  Denies of other symptoms at this time.      Physical Exam   Vital Signs: Temp: 98.1  F (36.7  C) Temp src: Oral BP: 134/61 Pulse: 67   Resp: 18 SpO2: 94 % O2 Device: None (Room air)    Weight: 81 lbs 0 oz    PHYSICAL EXAM:  GENERAL: Awake, alert, No acute distress.  Appears fatigued.  HEENT: + Somewhat dry mucous membranes.  No scleral icterus or conjunctival injection.   SKIN: Warm and dry.   LUNGS: Normal work of breathing with no use of accessory muscles. Inspiratory wheezing in anterior lung fields, intermittent scattered inspiratory and expiratory wheeze in posterior bases.   CARDIAC: RRR. Normal S1 and S2. No murmurs, clicks, or rubs appreciated. No peripheral edema.  ABDOMEN: Non-distended. Soft and non-tender throughout with no masses or organomegaly.  NEUROLOGIC: Alert and oriented.   EXTREMITIES: No gross deformity or peripheral edema. Appear well-perfused.       Data     I have personally reviewed the following data over the past 24 hrs:    N/A  \   N/A   / N/A     N/A N/A N/A /  103 (H)   N/A  N/A N/A \       Imaging results reviewed over the past 24 hrs:   No results found for this or any previous visit (from the past 24 hours).

## 2025-01-14 NOTE — PROGRESS NOTES
Pt remains on RA, SpO2 mid 90s, BS diminished bilaterally from the bases, inspiratory wheezing from the right lung, aerations increased post neb. Pt received neb per order, flutter valve given for airway clearance, instructions done, pt demonstrated well. RT following.    Joao Quezada, RT

## 2025-01-14 NOTE — PLAN OF CARE
Goal Outcome Evaluation:      Problem: Adult Inpatient Plan of Care  Goal: Plan of Care Review  Description: The Plan of Care Review/Shift note should be completed every shift.  The Outcome Evaluation is a brief statement about your assessment that the patient is improving, declining, or no change.  This information will be displayed automatically on your shift  note.  Outcome: Progressing     Patient reported experiencing dizziness & vision changes after morning ambulation. MD notified. VSS on RA. Initiated continuous fluids: NS 75mL/hr. PRN tylenol given for headache.

## 2025-01-14 NOTE — PLAN OF CARE
Problem: Adult Inpatient Plan of Care  Goal: Absence of Hospital-Acquired Illness or Injury  Intervention: Identify and Manage Fall Risk   Goal Outcome Evaluation:  Patient is alert and oriented x 4. Daughter in room and assisted with translation. Patient denied pain. Ambulates to the bathroom independently. Voided. IV saline locked. VSS on RA. Call light within reach.

## 2025-01-15 VITALS
WEIGHT: 81 LBS | HEIGHT: 60 IN | TEMPERATURE: 97.4 F | BODY MASS INDEX: 15.9 KG/M2 | HEART RATE: 78 BPM | DIASTOLIC BLOOD PRESSURE: 70 MMHG | SYSTOLIC BLOOD PRESSURE: 144 MMHG | RESPIRATION RATE: 20 BRPM | OXYGEN SATURATION: 95 %

## 2025-01-15 LAB
ANION GAP SERPL CALCULATED.3IONS-SCNC: 13 MMOL/L (ref 7–15)
BUN SERPL-MCNC: 10.5 MG/DL (ref 8–23)
CALCIUM SERPL-MCNC: 9.8 MG/DL (ref 8.8–10.4)
CHLORIDE SERPL-SCNC: 92 MMOL/L (ref 98–107)
CREAT SERPL-MCNC: 0.63 MG/DL (ref 0.51–0.95)
EGFRCR SERPLBLD CKD-EPI 2021: >90 ML/MIN/1.73M2
GLUCOSE SERPL-MCNC: 103 MG/DL (ref 70–99)
HCO3 SERPL-SCNC: 22 MMOL/L (ref 22–29)
HOLD SPECIMEN: NORMAL
MAGNESIUM SERPL-MCNC: 1.5 MG/DL (ref 1.7–2.3)
MAGNESIUM SERPL-MCNC: 2.4 MG/DL (ref 1.7–2.3)
POTASSIUM SERPL-SCNC: 3.8 MMOL/L (ref 3.4–5.3)
SODIUM SERPL-SCNC: 127 MMOL/L (ref 135–145)

## 2025-01-15 PROCEDURE — 36415 COLL VENOUS BLD VENIPUNCTURE: CPT | Performed by: FAMILY MEDICINE

## 2025-01-15 PROCEDURE — 250N000012 HC RX MED GY IP 250 OP 636 PS 637

## 2025-01-15 PROCEDURE — 250N000013 HC RX MED GY IP 250 OP 250 PS 637

## 2025-01-15 PROCEDURE — 80048 BASIC METABOLIC PNL TOTAL CA: CPT

## 2025-01-15 PROCEDURE — 94640 AIRWAY INHALATION TREATMENT: CPT | Mod: 76

## 2025-01-15 PROCEDURE — 94799 UNLISTED PULMONARY SVC/PX: CPT

## 2025-01-15 PROCEDURE — 999N000157 HC STATISTIC RCP TIME EA 10 MIN

## 2025-01-15 PROCEDURE — 82435 ASSAY OF BLOOD CHLORIDE: CPT

## 2025-01-15 PROCEDURE — 250N000011 HC RX IP 250 OP 636: Performed by: FAMILY MEDICINE

## 2025-01-15 PROCEDURE — 94640 AIRWAY INHALATION TREATMENT: CPT

## 2025-01-15 PROCEDURE — 83735 ASSAY OF MAGNESIUM: CPT | Performed by: FAMILY MEDICINE

## 2025-01-15 PROCEDURE — 250N000009 HC RX 250

## 2025-01-15 RX ORDER — MAGNESIUM SULFATE HEPTAHYDRATE 40 MG/ML
2 INJECTION, SOLUTION INTRAVENOUS ONCE
Status: COMPLETED | OUTPATIENT
Start: 2025-01-15 | End: 2025-01-15

## 2025-01-15 RX ORDER — PREDNISONE 20 MG/1
40 TABLET ORAL DAILY
Qty: 2 TABLET | Refills: 0 | Status: SHIPPED | OUTPATIENT
Start: 2025-01-16 | End: 2025-01-17

## 2025-01-15 RX ORDER — IPRATROPIUM BROMIDE AND ALBUTEROL SULFATE 2.5; .5 MG/3ML; MG/3ML
3 SOLUTION RESPIRATORY (INHALATION) EVERY 4 HOURS PRN
Qty: 90 ML | Refills: 0 | Status: SHIPPED | OUTPATIENT
Start: 2025-01-15

## 2025-01-15 RX ORDER — FAMOTIDINE 20 MG/1
20 TABLET, FILM COATED ORAL DAILY
Qty: 30 TABLET | Refills: 0 | Status: SHIPPED | OUTPATIENT
Start: 2025-01-16

## 2025-01-15 RX ORDER — LISINOPRIL 10 MG/1
10 TABLET ORAL DAILY
Qty: 30 TABLET | Refills: 0 | Status: SHIPPED | OUTPATIENT
Start: 2025-01-16

## 2025-01-15 RX ORDER — OSELTAMIVIR PHOSPHATE 30 MG/1
30 CAPSULE ORAL 2 TIMES DAILY
Qty: 4 CAPSULE | Refills: 0 | Status: SHIPPED | OUTPATIENT
Start: 2025-01-15 | End: 2025-01-17

## 2025-01-15 RX ADMIN — IPRATROPIUM BROMIDE AND ALBUTEROL SULFATE 3 ML: .5; 3 SOLUTION RESPIRATORY (INHALATION) at 08:07

## 2025-01-15 RX ADMIN — RIFAMPIN 600 MG: 300 CAPSULE ORAL at 08:53

## 2025-01-15 RX ADMIN — LISINOPRIL 10 MG: 10 TABLET ORAL at 08:53

## 2025-01-15 RX ADMIN — PREDNISONE 40 MG: 20 TABLET ORAL at 08:53

## 2025-01-15 RX ADMIN — ACETAMINOPHEN 650 MG: 325 TABLET ORAL at 08:53

## 2025-01-15 RX ADMIN — OSELTAMIVIR PHOSPHATE 30 MG: 30 CAPSULE ORAL at 08:53

## 2025-01-15 RX ADMIN — MAGNESIUM SULFATE HEPTAHYDRATE 2 G: 40 INJECTION, SOLUTION INTRAVENOUS at 11:30

## 2025-01-15 RX ADMIN — FAMOTIDINE 20 MG: 20 TABLET, FILM COATED ORAL at 08:53

## 2025-01-15 RX ADMIN — IPRATROPIUM BROMIDE AND ALBUTEROL SULFATE 3 ML: .5; 3 SOLUTION RESPIRATORY (INHALATION) at 11:31

## 2025-01-15 RX ADMIN — Medication 50 MCG: at 08:53

## 2025-01-15 ASSESSMENT — ACTIVITIES OF DAILY LIVING (ADL)
ADLS_ACUITY_SCORE: 34

## 2025-01-15 NOTE — PROVIDER NOTIFICATION
Notified [facility] regarding patient complaint of persistent, nonproductive cough making it difficult to sleep. Asked for orders for a cough suppressant to provide some relief to the patient. Orders placed for Tessalon capsules PRN TID.

## 2025-01-15 NOTE — PLAN OF CARE
Problem: Adult Inpatient Plan of Care  Goal: Absence of Hospital-Acquired Illness or Injury  Intervention: Prevent Infection  Recent Flowsheet Documentation  Taken 1/14/2025 2115 by Ellie Roman, RN  Infection Prevention:   equipment surfaces disinfected   hand hygiene promoted   personal protective equipment utilized   rest/sleep promoted   single patient room provided     Problem: Pneumonia  Goal: Effective Oxygenation and Ventilation  Intervention: Promote Airway Secretion Clearance  Recent Flowsheet Documentation  Taken 1/14/2025 2115 by Ellie Roman, RN  Cough And Deep Breathing: done independently per patient  Activity Management:   activity adjusted per tolerance   activity encouraged    A/Ox4, using call light appropriately, son at bedside to aide in translation/communication. Denies pain overnight. Tessalon capsules given for persistent, nonproductive cough, effective. VSS on RA. Lung sounds clear, equal bilaterally. Up independently in room. Droplet precautions maintained. IV saline locked after normal saline complete.

## 2025-01-15 NOTE — DISCHARGE SUMMARY
[facility]  Discharge Summary - Medicine & Pediatrics       Date of Admission:  1/11/2025  Date of Discharge:  1/15/2025  Discharging Provider: Dr. Nidia Salomon   Discharge Service: Hospitalist Service    Discharge Diagnoses   Influenza A   Hyponatremia     Clinically Significant Risk Factors     # Cachexia: Estimated body mass index is 15.82 kg/m  as calculated from the following:    Height as of this encounter: 1.524 m (5').    Weight as of this encounter: 36.7 kg (81 lb).       Follow-ups Needed After Discharge   Follow-up Appointments       Follow-up and recommended labs and tests       Follow up with primary care provider, NELIA MALONE, within 7 days to evaluate medication change, to evaluate treatment change, and for hospital follow- up.  The following labs/tests are recommended: BMP for hyponatremia, possible referral to cardiothoracic surgery for aortic aneurysm.      Follow up with provider at [facility] within 1 to 2 weeks, to evaluate medication change, specifically changing rifampin from 600 mg to 300 to 450 mg (recommended 450 mg daily).              Unresulted Labs Ordered in the Past 30 Days of this Admission       Date and Time Order Name Status Description    1/11/2025  8:50 PM Blood Culture Peripheral Blood Preliminary     1/11/2025  8:50 PM Blood Culture Peripheral Blood Preliminary         These results will be followed up by Dr. Salomon.     Discharge Disposition   Discharged to home  Condition at discharge: Stable    Hospital Course   Katja Long was admitted on 1/11/2025 for hypoxia secondary to influenza A and hyponatremia.  Patient has a history of hypertension, latent TB on rifampin 600mg daily, pulmonary scarring, and GERD on Pepcid at bedtime PRN.     # Hypoxemia secondary to Influenza A and asthma exacerbation   # Asthma exacerbation   The patient initially presented to the ER with hypoxemia that qualifies for SIRS and elevated WBC of  23.6, tachycardia. The patient was prescribed with Ventolin inhaler to which she uses infrequently.  Viral swab was positive for influenza A. Throughout her hospitalization, the patient felt improved with her shortness of breath. Patient was discharged with DuoNeb every 4 hours as needed, Tamiflu and prednisone (end date:1/17).     # Hyponatremia   Patient presented with sodium levels of 121 on admission with magnesium level 1.2.  Per chart review, this is probably likely due to chronic hyponatremia.  Patient was originally on lisinopril-hydrochlorothiazide combo medication for blood pressure; hydrochlorothiazide might be the reason why her sodium is low.  This was discontinued and lisinopril 10 mg was started here at the hospital.  She will continue to do this in the outpatient setting.    # GERD   Patient has a history of GERD and currently on Pepcid 40 mg nightly as needed for heartburn.  Throughout her hospitalization, the patient has mentioned waxing and waning heartburn.  Her Pepcid regimen has turned into a continuous schedule; the patient has tolerated this.  Pepcid 20 mg daily will be continued in the outpatient setting.      # Latent TB   Patient has a recent history of recent diagnosis of latent TB and started taking rifampin 600 mg daily on 1/6 and has taken 2 doses of that prior to admission.  The patient has been following [facility] for her rifampin treatment.  Per pharmacy and infectious disease's recommendation, rifampin treatment indication is 10 mg/kg (maximum of 600 mg); due to her low body weight, 300 to 450 mg daily is preferred.  Recommended to the patient and her family that she should discuss this with [facility] to start on 450 mg daily.    # Incidental finding on CT- Ascending thoracic aortic aneurysm   Patient CT chest pulmonary embolism with contrast on admission showed incidental finding of ascending thoracic aortic aneurysm  measuring 4.6 x 4.7 cm; radiology recommended for the patient to be seen by cardiothoracic surgery for consult for further evaluation as she meets criteria for a surgical evaluation.    TO DO IN OUTPATIENT SETTING:   - Continue tamiflu and prednisone (until 1/17)   - Use DuoNeb PRN; reevaluate asthma medications and coverage with patient in outpatient setting.   - Start Pepcid 40mg daily.   - Referral to cardiothoracic surgery for thoracic aortic aneurysm; patient and family are aware.   - Connect with [facility] to change Rifampin to 450 mg daily per patient's low body weight.       Consultations This Hospital Stay   PHYSICAL THERAPY ADULT IP CONSULT  OCCUPATIONAL THERAPY ADULT IP CONSULT    Code Status   No CPR- Do NOT Intubate       The patient was discussed with Dr. Bravo Yan.     Nidia Salomon DO, PGY1  [facility] [address] [address]  Phone: [phone]  Fax: [phone]  ______________________________________________________________________    Physical Exam   Vital Signs: Temp: 97.4  F (36.3  C) Temp src: Oral BP: (!) 144/70 Pulse: 78   Resp: 20 SpO2: 95 % O2 Device: None (Room air)    Weight: 81 lbs 0 oz    PHYSICAL EXAM:  GENERAL: Awake, alert, No acute distress.  Appears fatigued.  HEENT: Moist mucus membranes.  No scleral icterus or conjunctival injection.   SKIN: Warm and dry.   LUNGS: Normal work of breathing with no use of accessory muscles. Intermittent but improving scattered inspiratory and expiratory wheeze in posterior bases.   CARDIAC: RRR. Normal S1 and S2. No murmurs, clicks, or rubs appreciated. No peripheral edema.  ABDOMEN: Non-distended. Soft and non-tender throughout with no masses or organomegaly.  NEUROLOGIC: Alert and oriented.   EXTREMITIES: No gross deformity or peripheral edema. Appear well-perfused.          Primary Care Physician   NELIA MALONE    Discharge Orders      Reason for your hospital stay     Hypoxia secondary to influenza A, hyponatremia     Follow-up and recommended labs and tests     Follow up with primary care provider, NELIA MALONE, within 7 days to evaluate medication change, to evaluate treatment change, and for hospital follow- up.  The following labs/tests are recommended: BMP for hyponatremia, possible referral to cardiothoracic surgery for aortic aneurysm.      Follow up with provider at [facility] within 1 to 2 weeks, to evaluate medication change, specifically changing rifampin from 600 mg to 300 to 450 mg (recommended 450 mg daily).     Activity    Your activity upon discharge: activity as tolerated     Diet    Follow this diet upon discharge: Combination Diet Regular Diet Adult       Significant Results and Procedures   Results for orders placed or performed during the hospital encounter of 01/11/25   CT Chest Pulmonary Embolism w Contrast    Narrative    EXAM: CT CHEST PULMONARY EMBOLISM W CONTRAST  LOCATION: [facility]  DATE: 1/11/2025    INDICATION: hypoxemia with hemoptysis  COMPARISON: CT cervical spine 10/4/2024  TECHNIQUE: CT chest pulmonary angiogram during arterial phase injection of IV contrast. Multiplanar reformats and MIP reconstructions were performed. Dose reduction techniques were used.   CONTRAST: 90 cc Isovue 370    FINDINGS:  ANGIOGRAM CHEST: No central or central subsegmental pulmonary embolism. The mid and distal pulmonary arteries are not well opacified. Thoracic aorta is not well opacified and is  indeterminate for dissection. No CT evidence of right heart strain.    LUNGS AND PLEURA: Mild to moderate emphysematous changes throughout the lungs. 1.3 x 1.5 x 1.9 cm nodularity in the left apex appears similar to prior examination 10/4/2024. There is associated pleural parenchymal thickening in this region. This may   represent scarring based on stability however mass is not excluded. Biapical  pleural-parenchymal scarring. Moderate bronchiectasis changes with bronchial thickening in the mid and lower lungs and scattered areas of peribronchial nodularity or nodular   infiltrates likely secondary to inspissated secretions in the bronchi. No pleural effusions. No consolidative infiltrates. Calcified granulomas in the left lung.    MEDIASTINUM/AXILLAE: Ascending thoracic aortic aneurysm measuring approximately 4.6 x 4.7 cm. No evidence to suggest dissection. No adenopathy. No pericardial effusion.    CORONARY ARTERY CALCIFICATION: Moderate.    UPPER ABDOMEN: Cholecystectomy changes. No concerning upper abdominal findings.    MUSCULOSKELETAL: Mild spondylosis.      Impression    IMPRESSION:  1.  No evidence of central or central subsegmental pulmonary embolism. The mid and distal pulmonary arteries are not well opacified.  2.  Mild to moderate emphysematous changes throughout the lungs.  3.  1.3 x 1.5 x 1.9 cm nodularity in the left apex with associated pleural-parenchymal thickening. This may represent scarring however mass is not excluded. Please see below recommendation.  4.  Moderate bronchiectasis changes with bronchial thickening and scattered areas of peribronchial nodularity likely secondary to inspissated secretions in the bronchi.  5.  Ascending thoracic aortic aneurysm measuring 4.6 x 4.7 cm. Recommend Cardiothoracic Surgery consult for further evaluation.      Pulmonary nodules are a common incidental finding on CT. If there is prior imaging such as a chest radiograph or CT available at another facility, this should be compared to assess for stability.    Regardless of risk factors, referral to a pulmonologist is recommended to discuss further evaluation which may include tissue biopsy, PET-CT, or follow up imaging at 3, 9 and 24 months.    Adapted from Consensus Recommendations, Radiology 2005;237:395-400.        There is aneurysmal dilation of the ascending aorta to 4.6 x 4.7 cm. Recommend  Cardiothoracic Surgery consult for further evaluation.    JACR 2018;15:9332-1797 (PMID [id number]), J Cardiothorac Surg 2015 (PMID [id number]), Circulation 2010 (PMID 94250626).        Discharge Medications   Current Discharge Medication List        START taking these medications    Details   ipratropium - albuterol 0.5 mg/2.5 mg/3 mL (DUONEB) 0.5-2.5 (3) MG/3ML neb solution Take 1 vial (3 mLs) by nebulization every 4 hours as needed for shortness of breath, wheezing or cough.  Qty: 90 mL, Refills: 0    Associated Diagnoses: Influenza A      lisinopril (ZESTRIL) 10 MG tablet Take 1 tablet (10 mg) by mouth daily.  Qty: 30 tablet, Refills: 0    Associated Diagnoses: Essential hypertension, benign      oseltamivir (TAMIFLU) 30 MG capsule Take 1 capsule (30 mg) by mouth 2 times daily for 2 days.  Qty: 4 capsule, Refills: 0    Associated Diagnoses: Influenza A      predniSONE (DELTASONE) 20 MG tablet Take 2 tablets (40 mg) by mouth daily for 1 day.  Qty: 2 tablet, Refills: 0    Associated Diagnoses: Influenza A           CONTINUE these medications which have CHANGED    Details   famotidine (PEPCID) 20 MG tablet Take 1 tablet (20 mg) by mouth daily.  Qty: 30 tablet, Refills: 0    Associated Diagnoses: Gastroesophageal reflux disease, unspecified whether esophagitis present           CONTINUE these medications which have NOT CHANGED    Details   atorvastatin (LIPITOR) 20 MG tablet Take 1 tablet (20 mg) by mouth daily.  Qty: 90 tablet, Refills: 3    Associated Diagnoses: Essential hypertension, benign; Dyslipidemia      cholecalciferol 50 MCG (2000 UT) CAPS Take 50 mcg by mouth daily.      diclofenac (VOLTAREN) 1 % topical gel Apply 2 g topically 4 times daily as needed for moderate pain.  Qty: 100 g, Refills: 0    Associated Diagnoses: Chronic right-sided low back pain with right-sided sciatica      gabapentin (NEURONTIN) 300 MG capsule Take 300 mg by mouth daily as needed for neuropathic pain.      GOODSENSE ARTHRITIS PAIN  650 MG CR tablet Take 650 mg by mouth every 8 hours as needed for mild pain.      ketotifen fumarate 0.035% 0.035 % SOLN ophthalmic solution Place 1 drop into both eyes 2 times daily as needed for itching.      rifampin (RIFADIN) 300 MG capsule Take 600 mg by mouth daily.      VENTOLIN HFA 90 mcg/actuation inhaler Inhale 1-2 puffs into the lungs every 4 hours as needed for shortness of breath.  Refills: 1           STOP taking these medications       lisinopril-hydrochlorothiazide (ZESTORETIC) 20-12.5 MG tablet Comments:   Reason for Stopping:             Allergies   No Known Allergies

## 2025-01-15 NOTE — PLAN OF CARE
VSS afebrile. Denies pain. No dizziness. Up independently in room. Min cough, dry, nonproductive. IVF infusing. Son, Alfredo, in room for interpretation. Educated on interpretation resources as English is 2nd language for family as well.    Goal Outcome Evaluation:  Problem: Pneumonia  Goal: Effective Oxygenation and Ventilation    Problem: Gas Exchange Impaired  Goal: Optimal Gas Exchange  Outcome: Progressing

## 2025-01-16 ENCOUNTER — APPOINTMENT (OUTPATIENT)
Dept: INTERPRETER SERVICES | Facility: CLINIC | Age: 75
End: 2025-01-16
Payer: COMMERCIAL

## 2025-01-16 ENCOUNTER — NURSE TRIAGE (OUTPATIENT)
Dept: PEDIATRICS | Facility: CLINIC | Age: 75
End: 2025-01-16
Payer: COMMERCIAL

## 2025-01-16 ENCOUNTER — PATIENT OUTREACH (OUTPATIENT)
Dept: CARE COORDINATION | Facility: CLINIC | Age: 75
End: 2025-01-16
Payer: COMMERCIAL

## 2025-01-16 DIAGNOSIS — R11.2 NAUSEA AND VOMITING, UNSPECIFIED VOMITING TYPE: Primary | ICD-10-CM

## 2025-01-16 LAB
BACTERIA BLD CULT: NO GROWTH
BACTERIA BLD CULT: NO GROWTH

## 2025-01-16 RX ORDER — ONDANSETRON 4 MG/1
4 TABLET, ORALLY DISINTEGRATING ORAL EVERY 8 HOURS PRN
Qty: 20 TABLET | Refills: 0 | Status: SHIPPED | OUTPATIENT
Start: 2025-01-16

## 2025-01-16 NOTE — PROGRESS NOTES
"CHW dialed in Nurse Triage to talk with pt and pt's daughter. RN routed a message over to pt's PCP at [facility]. Pt's PCP will contact pt directly.     Clinic Care Coordination Contact  Transitions of Care Outreach  Chief Complaint   Patient presents with    Clinic Care Coordination - Post Hospital       Most Recent Admission Date: 1/11/2025   Most Recent Admission Diagnosis: Hypoxemia - R09.02  Acute bronchospasm - J98.01  Hypomagnesemia - E83.42  Hyponatremia - E87.1  Influenza A - J10.1  Latent tuberculosis - Z22.7  Acute sepsis (H) - A41.9  Aneurysm of ascending aorta without rupture - I71.21     Most Recent Discharge Date: 1/15/2025   Most Recent Discharge Diagnosis: Influenza A - J10.1  Hypoxemia - R09.02  Acute bronchospasm - J98.01  Latent tuberculosis - Z22.7  Acute sepsis (H) - A41.9  Hyponatremia - E87.1  Hypomagnesemia - E83.42  Aneurysm of ascending aorta without rupture - I71.21  Gastroesophageal reflux disease, unspecified whether esophagitis present - K21.9  Essential hypertension, benign - I10     Transitions of Care Assessment    Discharge Assessment  How are you doing now that you are home?: \"Not too well, she's not eating anything and just really tired. She was doing better at the hospital actually.\" (CHW dialed in Nurse Triage to talk with pt and pt's daughter.)  How are your symptoms? (Red Flag symptoms escalate to triage hotline per guidelines): Unchanged, Worsening  Do you know how to contact your clinic care team if you have future questions or changes to your health status? : Yes  Does the patient have their discharge instructions? : Yes  Does the patient have questions regarding their discharge instructions? : No  Were you started on any new medications or were there changes to any of your previous medications? : Yes  Does the patient have all of their medications?: Yes  Do you have questions regarding any of your medications? : No  Do you have all of your needed " medical supplies or equipment (DME)?  (i.e. oxygen tank, CPAP, cane, etc.): Yes    Post-op (CHW CTA Only)  If the patient had a surgery or procedure, do they have any questions for a nurse?: Yes (see comment) (Nurse Triage RN talked with pt's daughter, Ta.)         [facility] Explained and offered Care Coordination support to eligible patients: Yes    Patient accepted? No    Follow up Plan     Discharge Follow-Up  Discharge follow up appointment scheduled in alignment with recommended follow up timeframe or Transitions of Risk Category? (Low = within 30 days; Moderate= within 14 days; High= within 7 days): No  Patient's follow up appointment not scheduled: Patient declined scheduling support. Education on the importance of transitions of care follow up. Provided scheduling phone number.    Future Appointments   Date Time Provider Department Center   3/3/2025  1:30 PM Marleni Zavala APRN CNP [facility]       Outpatient Plan as outlined on AVS reviewed with patient.    For any urgent concerns, please contact our 24 hour nurse triage line: [phone] ([phone])       JOSE Pastrana  [phone]  Connected Care Resource [facility]

## 2025-01-16 NOTE — TELEPHONE ENCOUNTER
Provider Recommendation Follow Up:   Reached patient/caregiver. Informed of provider's recommendations. Patient verbalized understanding and agrees with the plan.     Marybeth Vargas RN

## 2025-01-16 NOTE — TELEPHONE ENCOUNTER
Nurse Triage SBAR    Is this a 2nd Level Triage? YES, LICENSED PRACTITIONER REVIEW IS REQUIRED    Situation: Flu difficulties    Background: Patient was discharged from the hospital yesterday after staying with flu complications    Assessment: Patient's daughter called in reporting the patient's symptoms have gotten worse since discharge. The patient discharged from the hospital yesterday with a confirmed case of influenza A. Since being home, the patient has vomited 3-4 times when attempting to eat solids, and has also had diarrhea the same amount of times. She states that when drinking fluids the patient is able to keep it down well.     There are no reports of fever, chest pain, SOB, earache, or stiffneck. As the patient is a high risk patient due to age, it is recommended the patient's PCP review the symptoms and send back recommendations to be relayed to the patient. Once her PCP is able to review, please call the patient's daughter Nou (Consent on file) to relay recommendations.     Protocol Recommended Disposition:   Discuss With PCP And Callback By Nurse Today    Recommendation: Discuss with PCP and callback by nurse today.      Routed to provider    Does the patient meet one of the following criteria for ADS visit consideration? 16+ years old, with an [facility] PCP     TIP  Providers, please consider if this condition is appropriate for management at one of our Acute and Diagnostic Services sites.     If patient is a good candidate, please use dotphrase <dot>triageresponse and select Refer to ADS to document.  Reason for Disposition   HIGH RISK (e.g., age > 64 years, pregnant, HIV+, chronic medical condition) and flu symptoms    Additional Information   Negative: SEVERE difficulty breathing (e.g., struggling for each breath, speaks in single words)   Negative: Bluish (or gray) lips or face   Negative: Shock suspected (e.g., cold/pale/clammy skin, too weak to stand, low BP, rapid pulse)   Negative: Sounds like a  life-threatening emergency to the triager   Negative: Symptoms of COVID-19 (e.g., cough, fever, SOB, or others) and COVID-19 is widespread in the community   Negative: Sounds like a cold and there is no fever   Negative: Cough and there is no fever   Negative: Severe cough   Negative: Throat pain and there is no fever   Negative: Severe sore throat   Negative: Influenza vaccine reaction is suspected   Negative: Headache and stiff neck (can't touch chin to chest)   Negative: Chest pain  (Exception: MILD central chest pain, present only when coughing.)   Negative: Difficulty breathing that is not severe and not relieved by cleaning out the nose   Negative: Patient sounds very sick or weak to the triager   Negative: Fever > 104 F (40 C)   Negative: Fever > 101 F (38.3 C) and over 60 years of age   Negative: Fever > 100 F (37.8 C) and diabetes mellitus or weak immune system (e.g., HIV positive, cancer chemo, splenectomy, organ transplant, chronic steroids)   Negative: Fever > 100 F (37.8 C) and bedridden (e.g., CVA, chronic illness, recovering from surgery)   Negative: Using nasal washes and pain medicine > 24 hours and sinus pain (lower forehead, cheekbone, or eye) persists   Negative: Fever present > 3 days (72 hours)   Negative: Fever returns after gone for over 24 hours and symptoms worse (or not improved)   Negative: Earache   Negative: Patient wants to be seen    Protocols used: Influenza (Flu) - Seasonal-A-OH    Kam Bermudez RN  [facility]

## 2025-01-16 NOTE — TELEPHONE ENCOUNTER
Covering for PCP.  Reviewed nurse triage note.    I have sent in a prescription for Zofran to help with nausea and vomiting, hopefully then she can keep down some fluids.    If patient is not able to keep down fluids and continues with the diarrhea, she may get dehydrated and may need to go back to the emergency department for IV fluids.  Signs of dehydration include fatigue, weakness, increased tiredness, lightheadedness, decreased urinary production.    Willie Nuñez MD

## 2025-02-11 DIAGNOSIS — I10 ESSENTIAL HYPERTENSION, BENIGN: ICD-10-CM

## 2025-02-11 DIAGNOSIS — K21.9 GASTROESOPHAGEAL REFLUX DISEASE, UNSPECIFIED WHETHER ESOPHAGITIS PRESENT: ICD-10-CM

## 2025-02-11 RX ORDER — LISINOPRIL 10 MG/1
TABLET ORAL
Qty: 30 TABLET | Refills: 0 | Status: SHIPPED | OUTPATIENT
Start: 2025-02-11

## 2025-02-11 RX ORDER — FAMOTIDINE 20 MG/1
20 TABLET, FILM COATED ORAL DAILY
Qty: 90 TABLET | Refills: 2 | Status: SHIPPED | OUTPATIENT
Start: 2025-02-11

## 2025-02-11 NOTE — TELEPHONE ENCOUNTER
Medication Request  Medication name: famotidine (PEPCID) 20 MG tablet   Requested Pharmacy:  [facility]  When was patient last seen for this?:  12/03/2024  Patient offered appointment:  No, patient is scheduled 03/03/2025  Okay to leave a detailed message: no

## 2025-02-13 ENCOUNTER — OFFICE VISIT (OUTPATIENT)
Dept: PHYSICAL MEDICINE AND REHAB | Facility: CLINIC | Age: 75
End: 2025-02-13
Attending: NURSE PRACTITIONER
Payer: COMMERCIAL

## 2025-02-13 VITALS
DIASTOLIC BLOOD PRESSURE: 77 MMHG | WEIGHT: 77 LBS | HEIGHT: 60 IN | BODY MASS INDEX: 15.12 KG/M2 | HEART RATE: 77 BPM | SYSTOLIC BLOOD PRESSURE: 169 MMHG

## 2025-02-13 DIAGNOSIS — M48.061 BILATERAL STENOSIS OF LATERAL RECESS OF LUMBAR SPINE: ICD-10-CM

## 2025-02-13 DIAGNOSIS — M79.18 MYOFASCIAL PAIN: ICD-10-CM

## 2025-02-13 DIAGNOSIS — M51.26 LUMBAR DISC HERNIATION: ICD-10-CM

## 2025-02-13 DIAGNOSIS — R29.898 RIGHT LEG WEAKNESS: ICD-10-CM

## 2025-02-13 DIAGNOSIS — M54.16 LUMBAR RADICULAR PAIN: Primary | ICD-10-CM

## 2025-02-13 DIAGNOSIS — M48.061 FORAMINAL STENOSIS OF LUMBAR REGION: ICD-10-CM

## 2025-02-13 RX ORDER — GABAPENTIN 100 MG/1
CAPSULE ORAL
Qty: 90 CAPSULE | Refills: 1 | Status: SHIPPED | OUTPATIENT
Start: 2025-02-13

## 2025-02-13 ASSESSMENT — PAIN SCALES - GENERAL: PAINLEVEL_OUTOF10: SEVERE PAIN (10)

## 2025-02-13 NOTE — LETTER
2/13/2025      Katja Long  [address] [facility] [id number]      Dear Colleague,    Thank you for referring your patient, Katja Long, to the [facility]. Please see a copy of my visit note below.    Assessment/Plan:      Katja was seen today for back pain.    Diagnoses and all orders for this visit:    Lumbar radicular pain  -     Pain Transforaminal Vicky Inj Lmbscrl 2 Lv Rt; Future  -     EMG; Future  -     Adult Neurosurgery  Referral; Future  -     gabapentin (NEURONTIN) 100 MG capsule; 100mg at bedtime x 3 days, then may increase to 200mg x 3 days, then may increase to 300mg at bedtime    Lumbar disc herniation  -     Pain Transforaminal Vicky Inj Lmbscrl 2 Lv Rt; Future  -     Adult Neurosurgery  Referral; Future    Right leg weakness  -     EMG; Future  -     Adult Neurosurgery  Referral; Future    Bilateral stenosis of lateral recess of lumbar spine  -     Pain Transforaminal Vicky Inj Lmbscrl 2 Lv Rt; Future  -     Adult Neurosurgery  Referral; Future    Foraminal stenosis of lumbar region  -     Pain Transforaminal Vicky Inj Lmbscrl 2 Lv Rt; Future    Myofascial pain  -     gabapentin (NEURONTIN) 100 MG capsule; 100mg at bedtime x 3 days, then may increase to 200mg x 3 days, then may increase to 300mg at bedtime    Other orders  -     Orthopedic  Referral         Assessment: Pleasant 74 year old female history of GERD, hyperlipidemia, nephrolithiasis with:    1.  2 years of chronic persistent right lower extremity radicular pain with weakness of the right great toe extensors.  Most consistent with an L4/5 radiculopathy.  Severe degenerative disc disease broad-based disc bulge resulting in bilateral lateral recess stenosis L4-5 and severe right foraminal stenosis compression of the right L4 and L5 nerve there is also foraminal disc bulge at L5-S1 with mild foraminal stenosis.  Has had physical therapy in the past approximately a year ago 6  visits and still does home exercises without benefit.  Has tried Tylenol.  Also had a right L4-5 and L5-S1 TFESI which provided over 80% relief for 7 to 8 months before the pain returned.  She is having some weakness of the great toe extensors however am concerned regarding persistent nerve compromise.    2.  Myofascial pain gluteal region resulting in poor sleep.      Discussion:    1.  I discussed the diagnosis and treatment options with the patient through her daughter who is an  as well.  We discussed options of further conservative management of PT, medications, injections also surgical evaluation and further diagnostics.  The patient is quite frustrated as her pain has been persistent for 2 years and worsening over time and she has weakness.  Would like more aggressive treatments but needs to control the pain as well.    2.  Recommend right L4-5 and L5-S1 TFESI's is provided over 80% relief for 7 to 8 months.    3.  Given the weakness in the right leg will perform EMG to evaluate for/confirm radiculopathy.    4.  Trial gabapentin 100 mg at bedtime slowly increasing to 300 mg.    5.  She would like surgical evaluation to discuss more permanent treatment for the right leg weakness and prevent further nerve compromise.  Order placed.    6.  Follow-up at earliest convenience for injection.      It was our pleasure caring for your patient today, if there any questions or concerns please do not hesitate to contact us.    Over 40 minutes were spent on the date of the encounter performing chart review, patient visit and documentation in addition to any procedure.    Subjective:   Patient ID: Katja Long is a 74 year old female.    History of Present Illness: Patient presents at the request of Marleni Zavala CNP for an evaluation of lumbar spine pain and right lower extremity pain.  Presents with her daughter who interprets for her today.  Signed a release of  today.  Patient has had 2 to 3 years of  back pain started without any injury right gluteal region and the posterior lateral right thigh to the lateral calf and anterior shin to the dorsal right foot with fairly constant pain and weakness in the leg.  Worsening over the past couple of years.  Worse with walking or sitting and better with putting a pillow behind her back.  Was seen approximately a year ago by neurosurgery.  At that time was sent to physical therapy had 6 visits continues to do the home exercises.  Takes Tylenol and was given gabapentin.  Cannot remember if gabapentin was helpful.  Eventually had right L4-5 and L5-S1 TFESI in March 18, 2024.  This decreased her pain from a 10/10 to a 2/10 for 7 to 8 months.  She was in a wheelchair before that and was able to walk.  In November of this year about 3 months ago without any new injury pain started return and became quite severe 10/10 today and at worst 8/10 at best.  Has poor sleep related to pain as well.    I reviewed notes from Monae Corimer PA-C neurosurgery from March 2024.  Patient felt to have right lumbar radicular pain had failed physical therapy 4 visits.  Referred for epidural steroid injection which was done March 18 right L4-5 and L5-S1 TFESI.  Immediately after the injection, her pain spread up from 5/10 to a 0/10.  I reviewed physical therapy visits which concluded April 16 2024.  Patient had 6 visits of PT.    Patient also reported having an injection at [facility].  I reviewed the [facility] and found no record of additional injections or lumbar procedures/imaging.    Imaging: MRI lumbar spine from March 18, 2024 images personally reviewed along with the report.  There is degenerative disc disease mild at L5-S1 L3-4 moderate to severe at L4-5 with central disc bulge/herniation with left paracentral component with severe left lateral recess stenosis.  There is also what appears to be severe right foraminal stenosis and mild foraminal stenosis at L5-S1.  Mild left foraminal  stenosis.      CT scan lumbar spine was reviewed as well from December 2023.  Acute fractures.  Moderate severe central stenosis L4-5 with severe right foraminal stenosis at L4-5 bilateral L5-S1 foraminal stenosis.  My review of the L4-5 disc, there is no significant calcification.  Severe disc height loss L4-5         Review of Systems: Complains of weight loss, cough, wheeze, abdominal pain, joint pain muscle pain muscle fatigue fainting poor sleep anxiety depression.  Recent hospitalization for influenza.  Denies fever, waking, headache, change in, chest pain, shortness of breath, nausea vomiting, constipation, bowel or bladder incontinence, skin issues, balance issues bruising issues. Remainder of 12 point review systems negative unless listed above.      Current Outpatient Medications   Medication Sig Dispense Refill     atorvastatin (LIPITOR) 20 MG tablet Take 1 tablet (20 mg) by mouth daily. 90 tablet 3     cholecalciferol 50 MCG (2000 UT) CAPS Take 50 mcg by mouth daily.       diclofenac (VOLTAREN) 1 % topical gel Apply 2 g topically 4 times daily as needed for moderate pain. 100 g 0     famotidine (PEPCID) 20 MG tablet Take 1 tablet (20 mg) by mouth daily. 90 tablet 2     gabapentin (NEURONTIN) 100 MG capsule 100mg at bedtime x 3 days, then may increase to 200mg x 3 days, then may increase to 300mg at bedtime 90 capsule 1     GOODSENSE ARTHRITIS PAIN 650 MG CR tablet Take 650 mg by mouth every 8 hours as needed for mild pain.       ipratropium - albuterol 0.5 mg/2.5 mg/3 mL (DUONEB) 0.5-2.5 (3) MG/3ML neb solution Take 1 vial (3 mLs) by nebulization every 4 hours as needed for shortness of breath, wheezing or cough. 90 mL 0     ketotifen fumarate 0.035% 0.035 % SOLN ophthalmic solution Place 1 drop into both eyes 2 times daily as needed for itching.       lisinopril (ZESTRIL) 10 MG tablet TAKE 1 TABLET (10 MG) BY MOUTH DAILY---NOJ 1 LUB TXHUA HNUB PAB THELMA NTSHAV SIAB 30 tablet 0     ondansetron (ZOFRAN  ODT) 4 MG ODT tab Take 1 tablet (4 mg) by mouth every 8 hours as needed for nausea. 20 tablet 0     rifampin (RIFADIN) 300 MG capsule Take 600 mg by mouth daily.       VENTOLIN HFA 90 mcg/actuation inhaler Inhale 1-2 puffs into the lungs every 4 hours as needed for shortness of breath.  1     gabapentin (NEURONTIN) 300 MG capsule Take 300 mg by mouth daily as needed for neuropathic pain. (Patient not taking: Reported on 2/13/2025)       No current facility-administered medications for this visit.       Past Medical History:   Diagnosis Date     GERD (gastroesophageal reflux disease)      Hyperlipidemia      Kidney stone        Family History   Problem Relation Age of Onset     Coronary Artery Disease Brother          Social History     Socioeconomic History     Marital status:      Spouse name: None     Number of children: None     Years of education: None     Highest education level: None   Tobacco Use     Smoking status: Never     Passive exposure: Never     Smokeless tobacco: Never   Vaping Use     Vaping status: Never Used   Substance and Sexual Activity     Alcohol use: No     Drug use: No     Social Drivers of Health     Financial Resource Strain: Low Risk  (1/12/2025)    Financial Resource Strain      Within the past 12 months, have you or your family members you live with been unable to get utilities (heat, electricity) when it was really needed?: No   Food Insecurity: Low Risk  (1/12/2025)    Food Insecurity      Within the past 12 months, did you worry that your food would run out before you got money to buy more?: No      Within the past 12 months, did the food you bought just not last and you didn t have money to get more?: No   Transportation Needs: Low Risk  (1/12/2025)    Transportation Needs      Within the past 12 months, has lack of transportation kept you from medical appointments, getting your medicines, non-medical meetings or appointments, work, or from getting things that you need?: No    Interpersonal Safety: Low Risk  (1/12/2025)    Interpersonal Safety      Do you feel physically and emotionally safe where you currently live?: Yes      Within the past 12 months, have you been hit, slapped, kicked or otherwise physically hurt by someone?: No      Within the past 12 months, have you been humiliated or emotionally abused in other ways by your partner or ex-partner?: No   Housing Stability: Low Risk  (1/12/2025)    Housing Stability      Do you have housing? : Yes      Are you worried about losing your housing?: No     Social history: .  No tobacco or alcohol.      The following portions of the patient's history were reviewed and updated as appropriate: allergies, current medications, past family history, past medical history, past social history, past surgical history and problem list.    Oswestry (KALEB) Questionnaire        4/16/2024     3:00 PM   OSWESTRY DISABILITY INDEX   Count 9   Sum 1   Oswestry Score (%) 2.22 %       Neck Disability Index:       No data to display                   PHQ-2 Score:         2/13/2025     7:55 AM 3/4/2024     1:47 PM   PHQ-2 ( 1999 [employer])   Q1: Little interest or pleasure in doing things 1 3   Q2: Feeling down, depressed or hopeless 1 3   PHQ-2 Score 2 6                  Objective:   Physical Exam:    BP (!) 169/77   Pulse 77   Ht 5' (1.524 m)   Wt 77 lb (34.9 kg)   BMI 15.04 kg/m    Body mass index is 15.04 kg/m .      General:  Well-appearing female in no acute distress.  Pleasant, cooperative, and interactive throughout the examination and interview.  CV: No lower extremity edema on inspection or paltation.  Lymphatics: No cervical lymphadenopathy palpated. Eyes: sclera clear. Skin: No rashes or lesions seen over the head/neck, hairline, arms, legs .  Respirations unlabored.  MSK: Gait is nonantalgic but mild shuffling right leg.  Able to heel-toe walk without difficulty.  Negative Romberg.  Spine: normal AP curves of the C, T, and L spine.  Full  range of motion in the Lumbar spine in all planes.  Palpation: Tenderness to palpation over right sciatic notch and PSIS.  Extremities: Full range of motion of the elbows, and wrists with no effusions or tenderness to palpation.   Full range of motion of the hips, knees, and ankles with no effusions or tenderness to palpation.  No hypermobility of the upper or lower extremities.  Neurologic exam: Mental status: Patient is alert and oriented with normal affect.  Attention, knowledge, memory, and language are intact.  Normal coordination throughout the examination.  Reflexes are 2+ and symmetric biceps, triceps, brachioradialis, 1+ right 2+ left patellar, and 2+ bilateral Achilles with down-going toes and Negative Shavon's.  Sensation is intact to light touch throughout the upper and lower extremities bilaterally.  Manual muscle testing reveals 5 out of 5 in the hip flexors, knee flexors/extensors, ankle plantar flexors, ankle  dorsiflexors, and 4/5 right 5/5 left EHL.  Upper extremities: Grossly normal strength . Normal muscle bulk and tone in the arms and legs.    Negative seated  straight leg raise bilaterally.            Again, thank you for allowing me to participate in the care of your patient.        Sincerely,        Alfonzo Chan DO    Electronically signed

## 2025-02-13 NOTE — PATIENT INSTRUCTIONS
A lumbar epidural injection has been ordered today. Please schedule this injection at least 2 weeks from now to allow time for insurance prior authorization. On the day of your injection, you cannot be sick or taking antibiotics. If you become sick and are prescribed, please call the clinic so your injection can be rescheduled for once you have completed your antibiotics.  It is recommended that you do not have a vaccination within 2 weeks of your procedure if it will contain steroids, as this may make the vaccination less effective.  You will need to bring a  with you for your injection. If you have any questions or concerns prior to your injection, please do not hesitate to call the nurse navigation line at [phone].   Schedule an EMG (nerve test) with Dr Chan.   Neurosurgery evaluation to discuss options  Prescribed Gabapentin today, 100 mg tablets, to be titrated up to 3 tablets at bedtime as tolerated for your nerve pain. Please follow Gabapentin dosing chart below.     Gabapentin 100mg Dosing Chart    DATE  MORNING AFTERNOON BEDTIME    Day 1 0 0 1    Day 2 0 0 1    Day 3 0 0 1    Day 4 0 0 2    Day 5 0 0 2    Day 6 0 0 2    Day 7 0 0 3    Day 8 0 0 3    Day 9 0 0 3                                                                                                                                    Continue medication, taking 3 capsules at bedtime    Please call if you have any questions regarding how to take your medication  Clinic Phone # [phone]

## 2025-02-13 NOTE — PROGRESS NOTES
Assessment/Plan:      Katja was seen today for back pain.    Diagnoses and all orders for this visit:    Lumbar radicular pain  -     Pain Transforaminal Vicky Inj Lmbscrl 2 Lv Rt; Future  -     EMG; Future  -     Adult Neurosurgery  Referral; Future  -     gabapentin (NEURONTIN) 100 MG capsule; 100mg at bedtime x 3 days, then may increase to 200mg x 3 days, then may increase to 300mg at bedtime    Lumbar disc herniation  -     Pain Transforaminal Vicky Inj Lmbscrl 2 Lv Rt; Future  -     Adult Neurosurgery  Referral; Future    Right leg weakness  -     EMG; Future  -     Adult Neurosurgery  Referral; Future    Bilateral stenosis of lateral recess of lumbar spine  -     Pain Transforaminal Vicky Inj Lmbscrl 2 Lv Rt; Future  -     Adult Neurosurgery  Referral; Future    Foraminal stenosis of lumbar region  -     Pain Transforaminal Vicky Inj Lmbscrl 2 Lv Rt; Future    Myofascial pain  -     gabapentin (NEURONTIN) 100 MG capsule; 100mg at bedtime x 3 days, then may increase to 200mg x 3 days, then may increase to 300mg at bedtime    Other orders  -     Orthopedic  Referral         Assessment: Pleasant 74 year old female history of GERD, hyperlipidemia, nephrolithiasis with:    1.  2 years of chronic persistent right lower extremity radicular pain with weakness of the right great toe extensors.  Most consistent with an L4/5 radiculopathy.  Severe degenerative disc disease broad-based disc bulge resulting in bilateral lateral recess stenosis L4-5 and severe right foraminal stenosis compression of the right L4 and L5 nerve there is also foraminal disc bulge at L5-S1 with mild foraminal stenosis.  Has had physical therapy in the past approximately a year ago 6 visits and still does home exercises without benefit.  Has tried Tylenol.  Also had a right L4-5 and L5-S1 TFESI which provided over 80% relief for 7 to 8 months before the pain returned.  She is having some weakness of the great  toe extensors however am concerned regarding persistent nerve compromise.    2.  Myofascial pain gluteal region resulting in poor sleep.      Discussion:    1.  I discussed the diagnosis and treatment options with the patient through her daughter who is an  as well.  We discussed options of further conservative management of PT, medications, injections also surgical evaluation and further diagnostics.  The patient is quite frustrated as her pain has been persistent for 2 years and worsening over time and she has weakness.  Would like more aggressive treatments but needs to control the pain as well.    2.  Recommend right L4-5 and L5-S1 TFESI's is provided over 80% relief for 7 to 8 months.    3.  Given the weakness in the right leg will perform EMG to evaluate for/confirm radiculopathy.    4.  Trial gabapentin 100 mg at bedtime slowly increasing to 300 mg.    5.  She would like surgical evaluation to discuss more permanent treatment for the right leg weakness and prevent further nerve compromise.  Order placed.    6.  Follow-up at earliest convenience for injection.      It was our pleasure caring for your patient today, if there any questions or concerns please do not hesitate to contact us.    Over 40 minutes were spent on the date of the encounter performing chart review, patient visit and documentation in addition to any procedure.    Subjective:   Patient ID: Katja Long is a 74 year old female.    History of Present Illness: Patient presents at the request of Marleni Zavala CNP for an evaluation of lumbar spine pain and right lower extremity pain.  Presents with her daughter who interprets for her today.  Signed a release of  today.  Patient has had 2 to 3 years of back pain started without any injury right gluteal region and the posterior lateral right thigh to the lateral calf and anterior shin to the dorsal right foot with fairly constant pain and weakness in the leg.  Worsening over the  past couple of years.  Worse with walking or sitting and better with putting a pillow behind her back.  Was seen approximately a year ago by neurosurgery.  At that time was sent to physical therapy had 6 visits continues to do the home exercises.  Takes Tylenol and was given gabapentin.  Cannot remember if gabapentin was helpful.  Eventually had right L4-5 and L5-S1 TFESI in March 18, 2024.  This decreased her pain from a 10/10 to a 2/10 for 7 to 8 months.  She was in a wheelchair before that and was able to walk.  In November of this year about 3 months ago without any new injury pain started return and became quite severe 10/10 today and at worst 8/10 at best.  Has poor sleep related to pain as well.    I reviewed notes from Monae Cormier PA-C neurosurgery from March 2024.  Patient felt to have right lumbar radicular pain had failed physical therapy 4 visits.  Referred for epidural steroid injection which was done March 18 right L4-5 and L5-S1 TFESI.  Immediately after the injection, her pain spread up from 5/10 to a 0/10.  I reviewed physical therapy visits which concluded April 16 2024.  Patient had 6 visits of PT.    Patient also reported having an injection at [facility].  I reviewed the [facility] and found no record of additional injections or lumbar procedures/imaging.    Imaging: MRI lumbar spine from March 18, 2024 images personally reviewed along with the report.  There is degenerative disc disease mild at L5-S1 L3-4 moderate to severe at L4-5 with central disc bulge/herniation with left paracentral component with severe left lateral recess stenosis.  There is also what appears to be severe right foraminal stenosis and mild foraminal stenosis at L5-S1.  Mild left foraminal stenosis.      CT scan lumbar spine was reviewed as well from December 2023.  Acute fractures.  Moderate severe central stenosis L4-5 with severe right foraminal stenosis at L4-5 bilateral L5-S1 foraminal stenosis.  My review of the  L4-5 disc, there is no significant calcification.  Severe disc height loss L4-5         Review of Systems: Complains of weight loss, cough, wheeze, abdominal pain, joint pain muscle pain muscle fatigue fainting poor sleep anxiety depression.  Recent hospitalization for influenza.  Denies fever, waking, headache, change in, chest pain, shortness of breath, nausea vomiting, constipation, bowel or bladder incontinence, skin issues, balance issues bruising issues. Remainder of 12 point review systems negative unless listed above.      Current Outpatient Medications   Medication Sig Dispense Refill    atorvastatin (LIPITOR) 20 MG tablet Take 1 tablet (20 mg) by mouth daily. 90 tablet 3    cholecalciferol 50 MCG (2000 UT) CAPS Take 50 mcg by mouth daily.      diclofenac (VOLTAREN) 1 % topical gel Apply 2 g topically 4 times daily as needed for moderate pain. 100 g 0    famotidine (PEPCID) 20 MG tablet Take 1 tablet (20 mg) by mouth daily. 90 tablet 2    gabapentin (NEURONTIN) 100 MG capsule 100mg at bedtime x 3 days, then may increase to 200mg x 3 days, then may increase to 300mg at bedtime 90 capsule 1    GOODSENSE ARTHRITIS PAIN 650 MG CR tablet Take 650 mg by mouth every 8 hours as needed for mild pain.      ipratropium - albuterol 0.5 mg/2.5 mg/3 mL (DUONEB) 0.5-2.5 (3) MG/3ML neb solution Take 1 vial (3 mLs) by nebulization every 4 hours as needed for shortness of breath, wheezing or cough. 90 mL 0    ketotifen fumarate 0.035% 0.035 % SOLN ophthalmic solution Place 1 drop into both eyes 2 times daily as needed for itching.      lisinopril (ZESTRIL) 10 MG tablet TAKE 1 TABLET (10 MG) BY MOUTH DAILY---NOJ 1 LUB TXHUA HNUB PAB THELMA NTSHAV SIAB 30 tablet 0    ondansetron (ZOFRAN ODT) 4 MG ODT tab Take 1 tablet (4 mg) by mouth every 8 hours as needed for nausea. 20 tablet 0    rifampin (RIFADIN) 300 MG capsule Take 600 mg by mouth daily.      VENTOLIN HFA 90 mcg/actuation inhaler Inhale 1-2 puffs into the lungs every 4  hours as needed for shortness of breath.  1    gabapentin (NEURONTIN) 300 MG capsule Take 300 mg by mouth daily as needed for neuropathic pain. (Patient not taking: Reported on 2/13/2025)       No current facility-administered medications for this visit.       Past Medical History:   Diagnosis Date    GERD (gastroesophageal reflux disease)     Hyperlipidemia     Kidney stone        Family History   Problem Relation Age of Onset    Coronary Artery Disease Brother          Social History     Socioeconomic History    Marital status:      Spouse name: None    Number of children: None    Years of education: None    Highest education level: None   Tobacco Use    Smoking status: Never     Passive exposure: Never    Smokeless tobacco: Never   Vaping Use    Vaping status: Never Used   Substance and Sexual Activity    Alcohol use: No    Drug use: No     Social Drivers of Health     Financial Resource Strain: Low Risk  (1/12/2025)    Financial Resource Strain     Within the past 12 months, have you or your family members you live with been unable to get utilities (heat, electricity) when it was really needed?: No   Food Insecurity: Low Risk  (1/12/2025)    Food Insecurity     Within the past 12 months, did you worry that your food would run out before you got money to buy more?: No     Within the past 12 months, did the food you bought just not last and you didn t have money to get more?: No   Transportation Needs: Low Risk  (1/12/2025)    Transportation Needs     Within the past 12 months, has lack of transportation kept you from medical appointments, getting your medicines, non-medical meetings or appointments, work, or from getting things that you need?: No   Interpersonal Safety: Low Risk  (1/12/2025)    Interpersonal Safety     Do you feel physically and emotionally safe where you currently live?: Yes     Within the past 12 months, have you been hit, slapped, kicked or otherwise physically hurt by someone?: No      Within the past 12 months, have you been humiliated or emotionally abused in other ways by your partner or ex-partner?: No   Housing Stability: Low Risk  (1/12/2025)    Housing Stability     Do you have housing? : Yes     Are you worried about losing your housing?: No     Social history: .  No tobacco or alcohol.      The following portions of the patient's history were reviewed and updated as appropriate: allergies, current medications, past family history, past medical history, past social history, past surgical history and problem list.    Oswestry (KALEB) Questionnaire        4/16/2024     3:00 PM   OSWESTRY DISABILITY INDEX   Count 9   Sum 1   Oswestry Score (%) 2.22 %       Neck Disability Index:       No data to display                   PHQ-2 Score:         2/13/2025     7:55 AM 3/4/2024     1:47 PM   PHQ-2 ( 1999 [employer])   Q1: Little interest or pleasure in doing things 1 3   Q2: Feeling down, depressed or hopeless 1 3   PHQ-2 Score 2 6                  Objective:   Physical Exam:    BP (!) 169/77   Pulse 77   Ht 5' (1.524 m)   Wt 77 lb (34.9 kg)   BMI 15.04 kg/m    Body mass index is 15.04 kg/m .      General:  Well-appearing female in no acute distress.  Pleasant, cooperative, and interactive throughout the examination and interview.  CV: No lower extremity edema on inspection or paltation.  Lymphatics: No cervical lymphadenopathy palpated. Eyes: sclera clear. Skin: No rashes or lesions seen over the head/neck, hairline, arms, legs .  Respirations unlabored.  MSK: Gait is nonantalgic but mild shuffling right leg.  Able to heel-toe walk without difficulty.  Negative Romberg.  Spine: normal AP curves of the C, T, and L spine.  Full range of motion in the Lumbar spine in all planes.  Palpation: Tenderness to palpation over right sciatic notch and PSIS.  Extremities: Full range of motion of the elbows, and wrists with no effusions or tenderness to palpation.   Full range of motion of the hips, knees,  and ankles with no effusions or tenderness to palpation.  No hypermobility of the upper or lower extremities.  Neurologic exam: Mental status: Patient is alert and oriented with normal affect.  Attention, knowledge, memory, and language are intact.  Normal coordination throughout the examination.  Reflexes are 2+ and symmetric biceps, triceps, brachioradialis, 1+ right 2+ left patellar, and 2+ bilateral Achilles with down-going toes and Negative Shavon's.  Sensation is intact to light touch throughout the upper and lower extremities bilaterally.  Manual muscle testing reveals 5 out of 5 in the hip flexors, knee flexors/extensors, ankle plantar flexors, ankle  dorsiflexors, and 4/5 right 5/5 left EHL.  Upper extremities: Grossly normal strength . Normal muscle bulk and tone in the arms and legs.    Negative seated  straight leg raise bilaterally.

## 2025-03-03 ENCOUNTER — OFFICE VISIT (OUTPATIENT)
Dept: FAMILY MEDICINE | Facility: CLINIC | Age: 75
End: 2025-03-03
Attending: NURSE PRACTITIONER
Payer: COMMERCIAL

## 2025-03-03 VITALS
RESPIRATION RATE: 18 BRPM | BODY MASS INDEX: 15.84 KG/M2 | WEIGHT: 80.7 LBS | TEMPERATURE: 98.3 F | HEIGHT: 60 IN | SYSTOLIC BLOOD PRESSURE: 134 MMHG | OXYGEN SATURATION: 97 % | HEART RATE: 69 BPM | DIASTOLIC BLOOD PRESSURE: 70 MMHG

## 2025-03-03 DIAGNOSIS — Z78.9 NON-ENGLISH SPEAKING PATIENT: ICD-10-CM

## 2025-03-03 DIAGNOSIS — K21.9 GASTROESOPHAGEAL REFLUX DISEASE, UNSPECIFIED WHETHER ESOPHAGITIS PRESENT: ICD-10-CM

## 2025-03-03 DIAGNOSIS — E78.5 DYSLIPIDEMIA: Primary | ICD-10-CM

## 2025-03-03 DIAGNOSIS — Z78.0 POST-MENOPAUSE: ICD-10-CM

## 2025-03-03 DIAGNOSIS — E55.9 VITAMIN D DEFICIENCY: ICD-10-CM

## 2025-03-03 DIAGNOSIS — E87.1 HYPONATREMIA: ICD-10-CM

## 2025-03-03 DIAGNOSIS — H57.9 ITCHY EYES: ICD-10-CM

## 2025-03-03 DIAGNOSIS — I10 ESSENTIAL HYPERTENSION, BENIGN: ICD-10-CM

## 2025-03-03 LAB
ANION GAP SERPL CALCULATED.3IONS-SCNC: 9 MMOL/L (ref 7–15)
BUN SERPL-MCNC: 10.4 MG/DL (ref 8–23)
CALCIUM SERPL-MCNC: 9.9 MG/DL (ref 8.8–10.4)
CHLORIDE SERPL-SCNC: 104 MMOL/L (ref 98–107)
CHOLEST SERPL-MCNC: 244 MG/DL
CREAT SERPL-MCNC: 0.71 MG/DL (ref 0.51–0.95)
EGFRCR SERPLBLD CKD-EPI 2021: 90 ML/MIN/1.73M2
FASTING STATUS PATIENT QL REPORTED: NO
FASTING STATUS PATIENT QL REPORTED: NO
GLUCOSE SERPL-MCNC: 123 MG/DL (ref 70–99)
HCO3 SERPL-SCNC: 26 MMOL/L (ref 22–29)
HDLC SERPL-MCNC: 70 MG/DL
LDLC SERPL CALC-MCNC: 155 MG/DL
NONHDLC SERPL-MCNC: 174 MG/DL
POTASSIUM SERPL-SCNC: 3.7 MMOL/L (ref 3.4–5.3)
SODIUM SERPL-SCNC: 139 MMOL/L (ref 135–145)
TRIGL SERPL-MCNC: 96 MG/DL

## 2025-03-03 PROCEDURE — 99214 OFFICE O/P EST MOD 30 MIN: CPT | Performed by: NURSE PRACTITIONER

## 2025-03-03 PROCEDURE — 80048 BASIC METABOLIC PNL TOTAL CA: CPT | Performed by: NURSE PRACTITIONER

## 2025-03-03 PROCEDURE — 80061 LIPID PANEL: CPT | Performed by: NURSE PRACTITIONER

## 2025-03-03 PROCEDURE — 3078F DIAST BP <80 MM HG: CPT | Performed by: NURSE PRACTITIONER

## 2025-03-03 PROCEDURE — 36415 COLL VENOUS BLD VENIPUNCTURE: CPT | Performed by: NURSE PRACTITIONER

## 2025-03-03 PROCEDURE — 3075F SYST BP GE 130 - 139MM HG: CPT | Performed by: NURSE PRACTITIONER

## 2025-03-03 RX ORDER — LISINOPRIL 10 MG/1
10 TABLET ORAL DAILY
Qty: 90 TABLET | Refills: 3 | Status: SHIPPED | OUTPATIENT
Start: 2025-03-03

## 2025-03-03 RX ORDER — KETOTIFEN FUMARATE 0.35 MG/ML
1 SOLUTION/ DROPS OPHTHALMIC 2 TIMES DAILY PRN
Qty: 5 ML | Refills: 0 | Status: SHIPPED | OUTPATIENT
Start: 2025-03-03

## 2025-03-03 NOTE — PROGRESS NOTES
Assessment & Plan     Benign Essential Hypertension  **  - lisinopril (ZESTRIL) 10 MG tablet  Dispense: 90 tablet; Refill: 3  - Basic metabolic panel  - Basic metabolic panel    Dyslipidemia  **  - Lipid Profile (Chol, Trig, HDL, LDL calc)  - Lipid Profile (Chol, Trig, HDL, LDL calc)    Hyponatremia  **  - Basic metabolic panel  - Basic metabolic panel    Gastroesophageal reflux disease, unspecified whether esophagitis present  **    Non-English speaking patient  **    Itchy eyes  **  - ketotifen fumarate 0.035% 0.035 % SOLN ophthalmic solution  Dispense: 5 mL; Refill: 0    Post-menopause  **  - cholecalciferol 50 MCG (2000 UT) CAPS  Dispense: 90 capsule; Refill: 3    Vitamin D deficiency  **  - cholecalciferol 50 MCG (2000 UT) CAPS  Dispense: 90 capsule; Refill: 3    - will repeat labs. Low sodium when in hospital. Hydrochlorothiazide was stopped. BP stable on ACE.   - GERD stable on pepcid. Weight is stable since last visit. Work on healthy food choices, do not hold meals, etc.  - fall prevention paramount. Dexa ordered.          Subjective   Hightower is a 74 year old, presenting for the following health issues:  Hypertension (Blood pressure and sciatica for over a year just received steroid shot 02/28/25)    - non-english. Daughter is helping with interpretation.   - drinks 2 48 oz water jugs per day. Broth too.   - needs Lisinopril refilled. BP has been stable. HO GERD, stable on Pepcid. Was seen in the hospital in January. Those symptoms have resolved.   - started med for cholesterol.   - needs vitamin D filled.   - seen spine medical, injection is helpful. Also stretching.         3/3/2025    12:59 PM   Additional Questions   Roomed by Nyasiasarah   Accompanied by Daughter     History of Present Illness       Reason for visit:  Blood pressure and sciatica                       Objective    /70 (BP Location: Left arm, Patient Position: Sitting, Cuff Size: Adult Small)   Pulse 69   Temp 98.3  F (36.8  C) (Oral)    Resp 18   Ht 1.524 m (5')   Wt 36.6 kg (80 lb 11.2 oz)   SpO2 97%   BMI 15.76 kg/m    Body mass index is 15.76 kg/m .  Physical Exam   GENERAL: alert and no distress    Admission on 01/11/2025, Discharged on 01/15/2025   Component Date Value Ref Range Status    Ventricular Rate 01/11/2025 127  BPM Final    Atrial Rate 01/11/2025 127  BPM Final    DC Interval 01/11/2025 148  ms Final    QRS Duration 01/11/2025 88  ms Final    QT 01/11/2025 302  ms Final    QTc 01/11/2025 438  ms Final    P Axis 01/11/2025 66  degrees Final    R AXIS 01/11/2025 -47  degrees Final    T Axis 01/11/2025 73  degrees Final    Interpretation ECG 01/11/2025    Final                    Value:Sinus tachycardia  Left anterior fascicular block  Minimal voltage criteria for LVH, may be normal variant  Abnormal ECG  When compared with ECG of 04-Oct-2024 09:43,  DC interval has decreased  Vent. rate has increased by  71 bpm  Confirmed by SEE ED PROVIDER NOTE FOR, ECG INTERPRETATION ([id number]),  SARA REYES ([id number]) on 1/11/2025 8:58:56 PM      Sodium 01/11/2025 118 (LL)  135 - 145 mmol/L Final    Potassium 01/11/2025 8.2 (HH)  3.4 - 5.3 mmol/L Final    Specimen moderately hemolyzed. The reported potassium value *IS LIKELY TO BE FALSELY ELEVATED* and should be interpreted with caution for clinical decision making. Analysis of a non-hemolyzed specimen (i.e. re-draw) may result in a lower potassium value.    Chloride 01/11/2025 79 (L)  98 - 107 mmol/L Final    Carbon Dioxide (CO2) 01/11/2025 20 (L)  22 - 29 mmol/L Final    Anion Gap 01/11/2025 19 (H)  7 - 15 mmol/L Final    Urea Nitrogen 01/11/2025 16.2  8.0 - 23.0 mg/dL Final    Creatinine 01/11/2025 0.57  0.51 - 0.95 mg/dL Final    GFR Estimate 01/11/2025 >90  >60 mL/min/1.73m2 Final    eGFR calculated using 2021 CKD-EPI equation.    Calcium 01/11/2025 9.9  8.8 - 10.4 mg/dL Final    Reference intervals for this test were updated on 7/16/2024 to reflect our healthy population more  accurately. There may be differences in the flagging of prior results with similar values performed with this method. Those prior results can be interpreted in the context of the updated reference intervals.    Glucose 01/11/2025 92  70 - 99 mg/dL Final    Troponin T, High Sensitivity 01/11/2025 21 (H)  <=14 ng/L Final    Either a High Sensitivity Troponin T baseline (0 hours) value = 100 ng/L, or an increase in High Sensitivity Troponin T = 7 ng/L at 2 hours compared to 0 hours (2-0 hours), suggests myocardial injury, and urgent clinical attention is required.    If the 2-0 hours increase is <7 ng/L, a High Sensitivity Troponin T result above gender-specific reference ranges warrants further evaluation.   Recommendations for further evaluation include correlation with clinical decision-making tool (e.g., HEART), a 3rd High Sensitivity Troponin T test 2 hours after the 2nd (a 20% change from baseline would represent concern), admission for observation, close PCC/cardiology follow-up, or urgent outpatient provocative testing.    Magnesium 01/11/2025 1.2 (L)  1.7 - 2.3 mg/dL Final    Protein Total 01/11/2025 8.2  6.4 - 8.3 g/dL Final    Albumin 01/11/2025 3.6  3.5 - 5.2 g/dL Final    Bilirubin Total 01/11/2025 0.5  <=1.2 mg/dL Final    Alkaline Phosphatase 01/11/2025    Final    Unsatisfactory specimen - hemolyzed     AST 01/11/2025    Final    Unsatisfactory specimen - hemolyzed     ALT 01/11/2025    Final    Unsatisfactory specimen - hemolyzed     Bilirubin Direct 01/11/2025    Final    Unsatisfactory specimen - hemolyzed     Lactic Acid, Initial 01/11/2025 1.3  0.7 - 2.0 mmol/L Final    Procalcitonin 01/11/2025 0.18  <0.50 ng/mL Final    Comment: Interpretation and Recommendations     <0.5 ng/mL:   Systemic bacterial infection unlikely. Local bacterial infection is possible.     0.5-1.99 ng/mL:   Systemic bacterial infection possible, but various other conditions are known to induce PCT as well.     >=2.00 ng/mL:    Systemic bacterial infection likely, unless other causes are known.     Decision to start antibiotics should not be based on procalcitonin level alone. See Procalcitonin Guidance document for more details. [email or web address]/[email or web address]-[email or web address]-guidance-[email or web address]    Factors that may affect PCT levels (not all-inclusive):     - Increased PCT level           Severe trauma/burns           Invasive surgery           Cooling therapy after cardiac arrest/surgery           Treatment with agents which stimulate cytokines           Acute kidney injury           Chronic kidney disease and end stage renal disease           Acute graft vs host disease           Non-specific shock                            causing decreased organ perfusion and/or infarction       - Normal or unchanged PCT level           Early in infections (if low and infection is suspected, repeating in 6-12 hours is recommended)           Chronic infections (endocarditis, osteomyelitis, prosthetic device/graft infections)           Localized infections (cellulitis, wound infections, intra-abdominal abscess)     Note: PCT has not been extensively studied in pregnancy/breastfeeding, pediatrics, severe immunosuppression, and cystic fibrosis.    Culture 01/11/2025 No Growth   Final    Culture 01/11/2025 No Growth   Final    Influenza A PCR 01/11/2025 Positive (A)  Negative Final    Influenza B PCR 01/11/2025 Negative  Negative Final    RSV PCR 01/11/2025 Negative  Negative Final    SARS CoV2 PCR 01/11/2025 Negative  Negative Final    NEGATIVE: SARS-CoV-2 (COVID-19) RNA not detected, presumed negative.    WBC Count 01/11/2025 27.5 (H)  4.0 - 11.0 10e3/uL Final    RBC Count 01/11/2025 4.84  3.80 - 5.20 10e6/uL Final    Hemoglobin 01/11/2025 12.7  11.7 - 15.7 g/dL Final    Hematocrit 01/11/2025 36.2  35.0 - 47.0 % Final    MCV 01/11/2025 75 (L)  78 - 100 fL Final    MCH 01/11/2025 26.2 (L)  26.5 - 33.0 pg Final    MCHC  01/11/2025 35.1  31.5 - 36.5 g/dL Final    RDW 01/11/2025 12.6  10.0 - 15.0 % Final    Platelet Count 01/11/2025 326  150 - 450 10e3/uL Final    % Neutrophils 01/11/2025 86  % Final    % Lymphocytes 01/11/2025 8  % Final    % Monocytes 01/11/2025 5  % Final    % Eosinophils 01/11/2025 0  % Final    % Basophils 01/11/2025 0  % Final    % Immature Granulocytes 01/11/2025 1  % Final    NRBCs per 100 WBC 01/11/2025 0  <1 /100 Final    Absolute Neutrophils 01/11/2025 23.7 (H)  1.6 - 8.3 10e3/uL Final    Absolute Lymphocytes 01/11/2025 2.2  0.8 - 5.3 10e3/uL Final    Absolute Monocytes 01/11/2025 1.3  0.0 - 1.3 10e3/uL Final    Absolute Eosinophils 01/11/2025 0.0  0.0 - 0.7 10e3/uL Final    Absolute Basophils 01/11/2025 0.1  0.0 - 0.2 10e3/uL Final    Absolute Immature Granulocytes 01/11/2025 0.2  <=0.4 10e3/uL Final    Absolute NRBCs 01/11/2025 0.0  10e3/uL Final    pH Venous 01/11/2025 7.39  7.32 - 7.43 Final    pCO2 Venous 01/11/2025 44  40 - 50 mm Hg Final    pO2 Venous 01/11/2025 39  25 - 47 mm Hg Final    Bicarbonate Venous 01/11/2025 27  21 - 28 mmol/L Final    Base Excess/Deficit Venous 01/11/2025 1.0  -3.0 - 3.0 mmol/L Final    FIO2 01/11/2025 30   Final    Oxyhemoglobin Venous 01/11/2025 69 (L)  70 - 75 % Final    O2 Sat, Venous 01/11/2025 69.4 (L)  70.0 - 75.0 % Final    INR 01/11/2025 0.91  0.85 - 1.15 Final    D-Dimer Quantitative 01/11/2025 1.67 (H)  0.00 - 0.50 ug/mL FEU Final    N terminal Pro BNP Inpatient 01/11/2025 83  0 - 900 pg/mL Final    Reference range shown and results flagged as abnormal are suggested inpatient cut points for confirming diagnosis if CHF in an acute setting. Establishing a baseline value for each individual patient is useful for follow-up. An inpatient or emergency department NT-proPBNP <300 pg/mL effectively rules out acute CHF, with 99% negative predictive value.    The outpatient non-acute reference range for ruling out CHF is:  0-125 pg/mL (age 18 to less than 75)  0-450 pg/mL  (age 75 yrs and older)     Sodium 01/11/2025 121 (L)  135 - 145 mmol/L Final    Potassium 01/11/2025 4.5  3.4 - 5.3 mmol/L Final    Chloride 01/11/2025 81 (L)  98 - 107 mmol/L Final    Carbon Dioxide (CO2) 01/11/2025 21 (L)  22 - 29 mmol/L Final    Anion Gap 01/11/2025 19 (H)  7 - 15 mmol/L Final    Urea Nitrogen 01/11/2025 16.4  8.0 - 23.0 mg/dL Final    Creatinine 01/11/2025 0.66  0.51 - 0.95 mg/dL Final    GFR Estimate 01/11/2025 >90  >60 mL/min/1.73m2 Final    eGFR calculated using 2021 CKD-EPI equation.    Calcium 01/11/2025 10.0  8.8 - 10.4 mg/dL Final    Reference intervals for this test were updated on 7/16/2024 to reflect our healthy population more accurately. There may be differences in the flagging of prior results with similar values performed with this method. Those prior results can be interpreted in the context of the updated reference intervals.    Glucose 01/11/2025 91  70 - 99 mg/dL Final    Troponin T, High Sensitivity 01/12/2025 22 (H)  <=14 ng/L Final    Either a High Sensitivity Troponin T baseline (0 hours) value = 100 ng/L, or an increase in High Sensitivity Troponin T = 7 ng/L at 2 hours compared to 0 hours (2-0 hours), suggests myocardial injury, and urgent clinical attention is required.    If the 2-0 hours increase is <7 ng/L, a High Sensitivity Troponin T result above gender-specific reference ranges warrants further evaluation.   Recommendations for further evaluation include correlation with clinical decision-making tool (e.g., HEART), a 3rd High Sensitivity Troponin T test 2 hours after the 2nd (a 20% change from baseline would represent concern), admission for observation, close PCC/cardiology follow-up, or urgent outpatient provocative testing.    Sodium 01/12/2025 127 (L)  135 - 145 mmol/L Final    Potassium 01/12/2025 4.5  3.4 - 5.3 mmol/L Final    Chloride 01/12/2025 88 (L)  98 - 107 mmol/L Final    Carbon Dioxide (CO2) 01/12/2025 21 (L)  22 - 29 mmol/L Final    Anion Gap  01/12/2025 18 (H)  7 - 15 mmol/L Final    Urea Nitrogen 01/12/2025 14.2  8.0 - 23.0 mg/dL Final    Creatinine 01/12/2025 0.64  0.51 - 0.95 mg/dL Final    GFR Estimate 01/12/2025 >90  >60 mL/min/1.73m2 Final    eGFR calculated using 2021 CKD-EPI equation.    Calcium 01/12/2025 9.6  8.8 - 10.4 mg/dL Final    Reference intervals for this test were updated on 7/16/2024 to reflect our healthy population more accurately. There may be differences in the flagging of prior results with similar values performed with this method. Those prior results can be interpreted in the context of the updated reference intervals.    Glucose 01/12/2025 134 (H)  70 - 99 mg/dL Final    WBC Count 01/12/2025 23.6 (H)  4.0 - 11.0 10e3/uL Final    RBC Count 01/12/2025 4.20  3.80 - 5.20 10e6/uL Final    Hemoglobin 01/12/2025 11.0 (L)  11.7 - 15.7 g/dL Final    Hematocrit 01/12/2025 31.8 (L)  35.0 - 47.0 % Final    MCV 01/12/2025 76 (L)  78 - 100 fL Final    MCH 01/12/2025 26.2 (L)  26.5 - 33.0 pg Final    MCHC 01/12/2025 34.6  31.5 - 36.5 g/dL Final    RDW 01/12/2025 12.5  10.0 - 15.0 % Final    Platelet Count 01/12/2025 277  150 - 450 10e3/uL Final    Osmolality Blood 01/12/2025 267 (L)  280 - 301 mmol/kg Final    Magnesium 01/12/2025 1.8  1.7 - 2.3 mg/dL Final    Sodium 01/12/2025 125 (L)  135 - 145 mmol/L Final    Potassium 01/12/2025 4.0  3.4 - 5.3 mmol/L Final    Chloride 01/12/2025 87 (L)  98 - 107 mmol/L Final    Carbon Dioxide (CO2) 01/12/2025 21 (L)  22 - 29 mmol/L Final    Anion Gap 01/12/2025 17 (H)  7 - 15 mmol/L Final    Urea Nitrogen 01/12/2025 15.8  8.0 - 23.0 mg/dL Final    Creatinine 01/12/2025 0.63  0.51 - 0.95 mg/dL Final    GFR Estimate 01/12/2025 >90  >60 mL/min/1.73m2 Final    eGFR calculated using 2021 CKD-EPI equation.    Calcium 01/12/2025 9.9  8.8 - 10.4 mg/dL Final    Reference intervals for this test were updated on 7/16/2024 to reflect our healthy population more accurately. There may be differences in the flagging of  prior results with similar values performed with this method. Those prior results can be interpreted in the context of the updated reference intervals.    Glucose 01/12/2025 188 (H)  70 - 99 mg/dL Final    Magnesium 01/13/2025 1.7  1.7 - 2.3 mg/dL Final    Sodium 01/13/2025 126 (L)  135 - 145 mmol/L Final    Potassium 01/13/2025 4.1  3.4 - 5.3 mmol/L Final    Chloride 01/13/2025 89 (L)  98 - 107 mmol/L Final    Carbon Dioxide (CO2) 01/13/2025 25  22 - 29 mmol/L Final    Anion Gap 01/13/2025 12  7 - 15 mmol/L Final    Urea Nitrogen 01/13/2025 18.8  8.0 - 23.0 mg/dL Final    Creatinine 01/13/2025 0.64  0.51 - 0.95 mg/dL Final    GFR Estimate 01/13/2025 >90  >60 mL/min/1.73m2 Final    eGFR calculated using 2021 CKD-EPI equation.    Calcium 01/13/2025 10.1  8.8 - 10.4 mg/dL Final    Reference intervals for this test were updated on 7/16/2024 to reflect our healthy population more accurately. There may be differences in the flagging of prior results with similar values performed with this method. Those prior results can be interpreted in the context of the updated reference intervals.    Glucose 01/13/2025 112 (H)  70 - 99 mg/dL Final    WBC Count 01/13/2025 13.8 (H)  4.0 - 11.0 10e3/uL Final    RBC Count 01/13/2025 3.84  3.80 - 5.20 10e6/uL Final    Hemoglobin 01/13/2025 10.1 (L)  11.7 - 15.7 g/dL Final    Hematocrit 01/13/2025 29.0 (L)  35.0 - 47.0 % Final    MCV 01/13/2025 76 (L)  78 - 100 fL Final    MCH 01/13/2025 26.3 (L)  26.5 - 33.0 pg Final    MCHC 01/13/2025 34.8  31.5 - 36.5 g/dL Final    RDW 01/13/2025 12.5  10.0 - 15.0 % Final    Platelet Count 01/13/2025 321  150 - 450 10e3/uL Final    WBC Count 01/14/2025 9.7  4.0 - 11.0 10e3/uL Final    RBC Count 01/14/2025 4.44  3.80 - 5.20 10e6/uL Final    Hemoglobin 01/14/2025 11.7  11.7 - 15.7 g/dL Final    Hematocrit 01/14/2025 33.8 (L)  35.0 - 47.0 % Final    MCV 01/14/2025 76 (L)  78 - 100 fL Final    MCH 01/14/2025 26.4 (L)  26.5 - 33.0 pg Final    MCHC 01/14/2025  34.6  31.5 - 36.5 g/dL Final    RDW 01/14/2025 12.5  10.0 - 15.0 % Final    Platelet Count 01/14/2025 424  150 - 450 10e3/uL Final    Sodium 01/14/2025 127 (L)  135 - 145 mmol/L Final    Potassium 01/14/2025 3.9  3.4 - 5.3 mmol/L Final    Chloride 01/14/2025 86 (L)  98 - 107 mmol/L Final    Carbon Dioxide (CO2) 01/14/2025 26  22 - 29 mmol/L Final    Anion Gap 01/14/2025 15  7 - 15 mmol/L Final    Urea Nitrogen 01/14/2025 16.6  8.0 - 23.0 mg/dL Final    Creatinine 01/14/2025 0.74  0.51 - 0.95 mg/dL Final    GFR Estimate 01/14/2025 85  >60 mL/min/1.73m2 Final    eGFR calculated using 2021 CKD-EPI equation.    Calcium 01/14/2025 10.1  8.8 - 10.4 mg/dL Final    Reference intervals for this test were updated on 7/16/2024 to reflect our healthy population more accurately. There may be differences in the flagging of prior results with similar values performed with this method. Those prior results can be interpreted in the context of the updated reference intervals.    Glucose 01/14/2025 103 (H)  70 - 99 mg/dL Final    Magnesium 01/14/2025 1.4 (L)  1.7 - 2.3 mg/dL Final    GLUCOSE BY METER POCT 01/14/2025 103 (H)  70 - 99 mg/dL Final    Magnesium 01/14/2025 2.0  1.7 - 2.3 mg/dL Final    Magnesium 01/15/2025 1.5 (L)  1.7 - 2.3 mg/dL Final    Hold Specimen 01/15/2025 JIC   Final    Sodium 01/15/2025 127 (L)  135 - 145 mmol/L Final    Potassium 01/15/2025 3.8  3.4 - 5.3 mmol/L Final    Chloride 01/15/2025 92 (L)  98 - 107 mmol/L Final    Carbon Dioxide (CO2) 01/15/2025 22  22 - 29 mmol/L Final    Anion Gap 01/15/2025 13  7 - 15 mmol/L Final    Urea Nitrogen 01/15/2025 10.5  8.0 - 23.0 mg/dL Final    Creatinine 01/15/2025 0.63  0.51 - 0.95 mg/dL Final    GFR Estimate 01/15/2025 >90  >60 mL/min/1.73m2 Final    eGFR calculated using 2021 CKD-EPI equation.    Calcium 01/15/2025 9.8  8.8 - 10.4 mg/dL Final    Reference intervals for this test were updated on 7/16/2024 to reflect our healthy population more accurately. There may be  differences in the flagging of prior results with similar values performed with this method. Those prior results can be interpreted in the context of the updated reference intervals.    Glucose 01/15/2025 103 (H)  70 - 99 mg/dL Final    Magnesium 01/15/2025 2.4 (H)  1.7 - 2.3 mg/dL Final     No results found for any visits on 03/03/25.  No results found for this or any previous visit (from the past 24 hours).        Signed Electronically by: YAA Stafford CNP

## 2025-03-04 ENCOUNTER — TELEPHONE (OUTPATIENT)
Dept: FAMILY MEDICINE | Facility: CLINIC | Age: 75
End: 2025-03-04
Payer: COMMERCIAL

## 2025-03-04 NOTE — RESULT ENCOUNTER NOTE
Can talk to daughter   Good to see you guys yesterday!    Sodium level is now back to normal. Continue with what she has been drinking, no fluid restriction is needed.     Her cholesterol is the same than prior. Are you sure she is taking Atorvastatin daily?     YAA Stafford CNP

## 2025-03-04 NOTE — TELEPHONE ENCOUNTER
03/03/25  General Call      Reason for Call:  Returning call for results    What are your questions or concerns:  Pt's daughter returned call for results:      Writer relayed message. Per [facility], they are not sure if pt has been taking atorvastatin regularly.     No requests Jocy Zavala to prescribe this if possible and send to the [facility] on file.  Rylee

## 2025-03-05 NOTE — TELEPHONE ENCOUNTER
Relayed message to daughter, she states she thought she was taking the statin daily but is not completely sure. States she will make sure she is doing so from now on.       ----- Message from Marleni Zavala sent at 3/4/2025 10:22 AM CST -----  Can talk to daughter   Good to see you guys yesterday!    Sodium level is now back to normal. Continue with what she has been drinking, no fluid restriction is needed.     Her cholesterol is the same than prior. Are you sure she is taking Atorvastatin daily?     Marleni Zavala, YAA CNP

## 2025-04-04 NOTE — PROGRESS NOTES
Assessment/Plan:      Katja was seen today for back pain.    Diagnoses and all orders for this visit:    Lumbar radicular pain  -     pregabalin (LYRICA) 25 MG capsule; Take 1 capsule (25 mg) by mouth at bedtime.    Bilateral stenosis of lateral recess of lumbar spine    Foraminal stenosis of lumbar region    Lumbar disc herniation    Myofascial pain  -     pregabalin (LYRICA) 25 MG capsule; Take 1 capsule (25 mg) by mouth at bedtime.    Sacral pain         Assessment: Pleasant 74 year old female with a history of GERD, hyperlipidemia, nephrolithiasis with:     1.  2 years of chronic persistent right lower extremity radicular pain with weakness of the right great toe extensors.  Most consistent with an L4/5 radiculopathy.  Severe degenerative disc disease broad-based disc bulge resulting in bilateral lateral recess stenosis L4-5 and severe right foraminal stenosis compression of the right L4 and L5 nerve there is also foraminal disc bulge at L5-S1 with mild foraminal stenosis.  Has had physical therapy in the past approximately a year ago 6 visits and still does home exercises without benefit.  Has tried Tylenol.  Also had a right L4-5 and L5-S1 TFESI which provided over 80% relief for 7 to 8 months before the pain returned.  She is having some weakness of the great toe extensors however am concerned regarding persistent nerve compromise.  At least 50% improved following right L4-5 and L5-S1 TFESI.  Continues to have right leg pain which has some interference with sleep.     2.  Myofascial pain gluteal region resulting in poor sleep.    3.  New sacral pain across the sacrum likely associated with recent weight loss likely from illness in January.    Discussion:    1.  We discussed the diagnosis and treatment options.  We discussed options of updated MRI if she would like neurosurgical evaluation along with medication changes.  Has not tolerated gabapentin due to side effects.  She is not interested in surgical  evaluation at this time as she has had some improvement in symptoms and would like to try to be more active but medication would be helpful as she does not tolerate gabapentin.    2.  Trial pregabalin 25 mg at bedtime to with neuropathic pain and sleep.    3.  Reassurance regarding sacral pain will monitor symptoms for now.  Likely related to weight loss through the gluteal region.    4.  Can consider repeat injection in the future if pain returns or worsens to preinjection level and can also consider surgical evaluation but would need new MRI as the last MRI is over 1-year-old.  Will hold off on further imaging for now.    5.  Follow-up 2 months.       It was our pleasure caring for your patient today, if there any questions or concerns please do not hesitate to contact us.      Subjective:   Patient ID: Katja Long is a 74 year old female.    History of Present Illness: Patient presents with her daughter who interprets today for the patient.  This is for follow-up of lumbar spine right lower extremity radicular pain also having new sacral pain.  Lumbar Dickler pain approximately 50% or more better following lumbar epidural steroid injections able to stand and walk for longer period of time but standing walking is very painful along with sleep.  Better with rest.  Pain is 10/10 at worst 4/10 today 1/10 at best.  Pain is down the right leg to the anterior right ankle with some numbness and tingling.  Gabapentin causes headaches and she stopped taking the medication.  Having difficult time sleeping with pain.  Having new sacral pain worse with laying down as well.  Weight loss since January after illness and pressure on the sacrum even with sitting increases pain in the sacrum itself.    EMG of the right lower extremity March 7: Normal study.  No electrodiagnostic evidence of lumbosacral early Of the thigh, lumbosacral plexopathy or focal neuropathy right lower extremity.    Imaging: MRI lumbar spine reviewed showing  central disc herniation at L4-5 mild broad-based disc bulge L5-S1.  L4-5 reveals a right foraminal/far lateral component compressing the exiting right L4 nerve as well as left lateral recess stenosis with left L5 nerve compression.    Review of Systems: Complains of numbness tingling weakness no bowel or bladder incontinence swallowing issues fevers.  Has had weight loss from illness.  Has headaches with medication otherwise no headaches.       Current Outpatient Medications   Medication Sig Dispense Refill    atorvastatin (LIPITOR) 20 MG tablet Take 1 tablet (20 mg) by mouth daily. 90 tablet 3    cholecalciferol 50 MCG (2000 UT) CAPS Take 50 mcg by mouth daily. 90 capsule 3    diclofenac (VOLTAREN) 1 % topical gel Apply 2 g topically 4 times daily as needed for moderate pain. 100 g 0    famotidine (PEPCID) 20 MG tablet Take 1 tablet (20 mg) by mouth daily. 90 tablet 2    gabapentin (NEURONTIN) 100 MG capsule 100mg at bedtime x 3 days, then may increase to 200mg x 3 days, then may increase to 300mg at bedtime 90 capsule 1    gabapentin (NEURONTIN) 300 MG capsule Take 300 mg by mouth daily as needed for neuropathic pain.      GOODSENSE ARTHRITIS PAIN 650 MG CR tablet Take 650 mg by mouth every 8 hours as needed for mild pain.      ipratropium - albuterol 0.5 mg/2.5 mg/3 mL (DUONEB) 0.5-2.5 (3) MG/3ML neb solution Take 1 vial (3 mLs) by nebulization every 4 hours as needed for shortness of breath, wheezing or cough. 90 mL 0    ketotifen fumarate 0.035% 0.035 % SOLN ophthalmic solution Place 1 drop into both eyes 2 times daily as needed for itching. 5 mL 0    lisinopril (ZESTRIL) 20 MG tablet Take 1 tablet (20 mg) by mouth daily. 90 tablet 3    ondansetron (ZOFRAN ODT) 4 MG ODT tab Take 1 tablet (4 mg) by mouth every 8 hours as needed for nausea. 20 tablet 0    pantoprazole (PROTONIX) 40 MG EC tablet Take 1 tablet (40 mg) by mouth daily. 30 tablet 0    polyethylene glycol (MIRALAX) 17 GM/Dose powder Take 17 g by mouth  daily. 510 g 2    psyllium (METAMUCIL/KONSYL) capsule Take 1 capsule by mouth daily. 90 capsule 3    rifampin (RIFADIN) 300 MG capsule Take 600 mg by mouth daily.      VENTOLIN HFA 90 mcg/actuation inhaler Inhale 1-2 puffs into the lungs every 4 hours as needed for shortness of breath.  1     No current facility-administered medications for this visit.       Past Medical History:   Diagnosis Date    GERD (gastroesophageal reflux disease)     Hyperlipidemia     Kidney stone        The following portions of the patient's history were reviewed and updated as appropriate: allergies, current medications, past family history, past medical history, past social history, past surgical history and problem list.           Objective:   Physical Exam:    /81   Pulse (!) 46   Wt 78 lb 1.6 oz (35.4 kg)   BMI 15.25 kg/m    There is no height or weight on file to calculate BMI.      General: Alert and oriented with normal affect. Attention, knowledge, memory, and language are intact. No acute distress.   Eyes: Sclerae are clear.  Respirations: Unlabored. CV: No lower extremity edema.     Gait:  Nonantalgic  No significant tenderness over the sacrum the no subcutaneous fat in that area.  Sensation is intact to light touch throughout the  lower extremities.  Reflexes are 1+ patellar and 0 Achilles      Manual muscle testing reveals:  Right /Left out of 5        5/5 knee flexors  5/5 knee extensors  5/5 ankle plantar flexors  5/5 ankle dorsiflexors  5/5  EHL

## 2025-04-07 ENCOUNTER — OFFICE VISIT (OUTPATIENT)
Dept: PHYSICAL MEDICINE AND REHAB | Facility: CLINIC | Age: 75
End: 2025-04-07
Payer: COMMERCIAL

## 2025-04-07 VITALS
BODY MASS INDEX: 15.25 KG/M2 | HEART RATE: 46 BPM | SYSTOLIC BLOOD PRESSURE: 130 MMHG | DIASTOLIC BLOOD PRESSURE: 81 MMHG | WEIGHT: 78.1 LBS

## 2025-04-07 DIAGNOSIS — M48.061 BILATERAL STENOSIS OF LATERAL RECESS OF LUMBAR SPINE: ICD-10-CM

## 2025-04-07 DIAGNOSIS — M79.18 MYOFASCIAL PAIN: ICD-10-CM

## 2025-04-07 DIAGNOSIS — M48.061 FORAMINAL STENOSIS OF LUMBAR REGION: ICD-10-CM

## 2025-04-07 DIAGNOSIS — M51.26 LUMBAR DISC HERNIATION: ICD-10-CM

## 2025-04-07 DIAGNOSIS — M53.3 SACRAL PAIN: ICD-10-CM

## 2025-04-07 DIAGNOSIS — M54.16 LUMBAR RADICULAR PAIN: Primary | ICD-10-CM

## 2025-04-07 RX ORDER — PREGABALIN 25 MG/1
25 CAPSULE ORAL AT BEDTIME
Qty: 30 CAPSULE | Refills: 1 | Status: SHIPPED | OUTPATIENT
Start: 2025-04-07

## 2025-04-07 ASSESSMENT — PAIN SCALES - GENERAL: PAINLEVEL_OUTOF10: MODERATE PAIN (4)

## 2025-04-07 NOTE — LETTER
4/7/2025      Katja Long  [address] [facility] [address]      Dear Colleague,    Thank you for referring your patient, Katja Long, to the [facility]. Please see a copy of my visit note below.    Assessment/Plan:      Katja was seen today for back pain.    Diagnoses and all orders for this visit:    Lumbar radicular pain  -     pregabalin (LYRICA) 25 MG capsule; Take 1 capsule (25 mg) by mouth at bedtime.    Bilateral stenosis of lateral recess of lumbar spine    Foraminal stenosis of lumbar region    Lumbar disc herniation    Myofascial pain  -     pregabalin (LYRICA) 25 MG capsule; Take 1 capsule (25 mg) by mouth at bedtime.    Sacral pain         Assessment: Pleasant 74 year old female with a history of GERD, hyperlipidemia, nephrolithiasis with:     1.  2 years of chronic persistent right lower extremity radicular pain with weakness of the right great toe extensors.  Most consistent with an L4/5 radiculopathy.  Severe degenerative disc disease broad-based disc bulge resulting in bilateral lateral recess stenosis L4-5 and severe right foraminal stenosis compression of the right L4 and L5 nerve there is also foraminal disc bulge at L5-S1 with mild foraminal stenosis.  Has had physical therapy in the past approximately a year ago 6 visits and still does home exercises without benefit.  Has tried Tylenol.  Also had a right L4-5 and L5-S1 TFESI which provided over 80% relief for 7 to 8 months before the pain returned.  She is having some weakness of the great toe extensors however am concerned regarding persistent nerve compromise.  At least 50% improved following right L4-5 and L5-S1 TFESI.  Continues to have right leg pain which has some interference with sleep.     2.  Myofascial pain gluteal region resulting in poor sleep.    3.  New sacral pain across the sacrum likely associated with recent weight loss likely from illness in January.    Discussion:    1.  We discussed the  diagnosis and treatment options.  We discussed options of updated MRI if she would like neurosurgical evaluation along with medication changes.  Has not tolerated gabapentin due to side effects.  She is not interested in surgical evaluation at this time as she has had some improvement in symptoms and would like to try to be more active but medication would be helpful as she does not tolerate gabapentin.    2.  Trial pregabalin 25 mg at bedtime to with neuropathic pain and sleep.    3.  Reassurance regarding sacral pain will monitor symptoms for now.  Likely related to weight loss through the gluteal region.    4.  Can consider repeat injection in the future if pain returns or worsens to preinjection level and can also consider surgical evaluation but would need new MRI as the last MRI is over 1-year-old.  Will hold off on further imaging for now.    5.  Follow-up 2 months.       It was our pleasure caring for your patient today, if there any questions or concerns please do not hesitate to contact us.      Subjective:   Patient ID: Katja Long is a 74 year old female.    History of Present Illness: Patient presents with her daughter who interprets today for the patient.  This is for follow-up of lumbar spine right lower extremity radicular pain also having new sacral pain.  Lumbar Dickler pain approximately 50% or more better following lumbar epidural steroid injections able to stand and walk for longer period of time but standing walking is very painful along with sleep.  Better with rest.  Pain is 10/10 at worst 4/10 today 1/10 at best.  Pain is down the right leg to the anterior right ankle with some numbness and tingling.  Gabapentin causes headaches and she stopped taking the medication.  Having difficult time sleeping with pain.  Having new sacral pain worse with laying down as well.  Weight loss since January after illness and pressure on the sacrum even with sitting increases pain in the sacrum itself.    EMG of  the right lower extremity March 7: Normal study.  No electrodiagnostic evidence of lumbosacral early Of the thigh, lumbosacral plexopathy or focal neuropathy right lower extremity.    Imaging: MRI lumbar spine reviewed showing central disc herniation at L4-5 mild broad-based disc bulge L5-S1.  L4-5 reveals a right foraminal/far lateral component compressing the exiting right L4 nerve as well as left lateral recess stenosis with left L5 nerve compression.    Review of Systems: Complains of numbness tingling weakness no bowel or bladder incontinence swallowing issues fevers.  Has had weight loss from illness.  Has headaches with medication otherwise no headaches.       Current Outpatient Medications   Medication Sig Dispense Refill     atorvastatin (LIPITOR) 20 MG tablet Take 1 tablet (20 mg) by mouth daily. 90 tablet 3     cholecalciferol 50 MCG (2000 UT) CAPS Take 50 mcg by mouth daily. 90 capsule 3     diclofenac (VOLTAREN) 1 % topical gel Apply 2 g topically 4 times daily as needed for moderate pain. 100 g 0     famotidine (PEPCID) 20 MG tablet Take 1 tablet (20 mg) by mouth daily. 90 tablet 2     gabapentin (NEURONTIN) 100 MG capsule 100mg at bedtime x 3 days, then may increase to 200mg x 3 days, then may increase to 300mg at bedtime 90 capsule 1     gabapentin (NEURONTIN) 300 MG capsule Take 300 mg by mouth daily as needed for neuropathic pain.       GOODSENSE ARTHRITIS PAIN 650 MG CR tablet Take 650 mg by mouth every 8 hours as needed for mild pain.       ipratropium - albuterol 0.5 mg/2.5 mg/3 mL (DUONEB) 0.5-2.5 (3) MG/3ML neb solution Take 1 vial (3 mLs) by nebulization every 4 hours as needed for shortness of breath, wheezing or cough. 90 mL 0     ketotifen fumarate 0.035% 0.035 % SOLN ophthalmic solution Place 1 drop into both eyes 2 times daily as needed for itching. 5 mL 0     lisinopril (ZESTRIL) 20 MG tablet Take 1 tablet (20 mg) by mouth daily. 90 tablet 3     ondansetron (ZOFRAN ODT) 4 MG ODT tab  Take 1 tablet (4 mg) by mouth every 8 hours as needed for nausea. 20 tablet 0     pantoprazole (PROTONIX) 40 MG EC tablet Take 1 tablet (40 mg) by mouth daily. 30 tablet 0     polyethylene glycol (MIRALAX) 17 GM/Dose powder Take 17 g by mouth daily. 510 g 2     psyllium (METAMUCIL/KONSYL) capsule Take 1 capsule by mouth daily. 90 capsule 3     rifampin (RIFADIN) 300 MG capsule Take 600 mg by mouth daily.       VENTOLIN HFA 90 mcg/actuation inhaler Inhale 1-2 puffs into the lungs every 4 hours as needed for shortness of breath.  1     No current facility-administered medications for this visit.       Past Medical History:   Diagnosis Date     GERD (gastroesophageal reflux disease)      Hyperlipidemia      Kidney stone        The following portions of the patient's history were reviewed and updated as appropriate: allergies, current medications, past family history, past medical history, past social history, past surgical history and problem list.           Objective:   Physical Exam:    /81   Pulse (!) 46   Wt 78 lb 1.6 oz (35.4 kg)   BMI 15.25 kg/m    There is no height or weight on file to calculate BMI.      General: Alert and oriented with normal affect. Attention, knowledge, memory, and language are intact. No acute distress.   Eyes: Sclerae are clear.  Respirations: Unlabored. CV: No lower extremity edema.     Gait:  Nonantalgic  No significant tenderness over the sacrum the no subcutaneous fat in that area.  Sensation is intact to light touch throughout the  lower extremities.  Reflexes are 1+ patellar and 0 Achilles      Manual muscle testing reveals:  Right /Left out of 5        5/5 knee flexors  5/5 knee extensors  5/5 ankle plantar flexors  5/5 ankle dorsiflexors  5/5  EHL       Again, thank you for allowing me to participate in the care of your patient.        Sincerely,        Alfonzo Chan DO    Electronically signed

## 2025-04-17 ENCOUNTER — OFFICE VISIT (OUTPATIENT)
Dept: FAMILY MEDICINE | Facility: CLINIC | Age: 75
End: 2025-04-17
Payer: COMMERCIAL

## 2025-04-17 VITALS
WEIGHT: 78 LBS | DIASTOLIC BLOOD PRESSURE: 71 MMHG | OXYGEN SATURATION: 94 % | RESPIRATION RATE: 12 BRPM | TEMPERATURE: 98.6 F | HEIGHT: 60 IN | BODY MASS INDEX: 15.31 KG/M2 | SYSTOLIC BLOOD PRESSURE: 173 MMHG | HEART RATE: 82 BPM

## 2025-04-17 DIAGNOSIS — I10 ESSENTIAL HYPERTENSION, BENIGN: Primary | ICD-10-CM

## 2025-04-17 RX ORDER — LISINOPRIL 40 MG/1
40 TABLET ORAL DAILY
Qty: 90 TABLET | Refills: 3 | Status: SHIPPED | OUTPATIENT
Start: 2025-04-17

## 2025-04-17 RX ORDER — AMLODIPINE BESYLATE 5 MG/1
5 TABLET ORAL DAILY
Qty: 90 TABLET | Refills: 3 | Status: SHIPPED | OUTPATIENT
Start: 2025-04-17

## 2025-05-22 ENCOUNTER — PATIENT OUTREACH (OUTPATIENT)
Dept: CARE COORDINATION | Facility: CLINIC | Age: 75
End: 2025-05-22
Payer: COMMERCIAL

## 2025-05-29 ENCOUNTER — TELEPHONE (OUTPATIENT)
Dept: FAMILY MEDICINE | Facility: CLINIC | Age: 75
End: 2025-05-29
Payer: COMMERCIAL

## 2025-05-29 NOTE — TELEPHONE ENCOUNTER
Patient Quality Outreach    Patient is due for the following:   Hypertension -  BP check and Hypertension follow-up visit  Colon Cancer Screening  Breast Cancer Screening - Mammogram  Physical Annual Wellness Visit      Topic Date Due    COVID-19 Vaccine (3 - 2024-25 season) 04/21/2025     DEXA, Advance Care Planning,    Action(s) Taken:   Patient has upcoming appointment, these items will be addressed at that time.    Type of outreach:    Chart review performed, no outreach needed.    Questions for provider review:    None         Gaye Hall, AMIRA  Chart routed to None.

## 2025-06-09 ENCOUNTER — PATIENT OUTREACH (OUTPATIENT)
Dept: CARE COORDINATION | Facility: CLINIC | Age: 75
End: 2025-06-09
Payer: COMMERCIAL

## 2025-06-17 ENCOUNTER — PATIENT OUTREACH (OUTPATIENT)
Dept: CARE COORDINATION | Facility: CLINIC | Age: 75
End: 2025-06-17